# Patient Record
Sex: MALE | Race: BLACK OR AFRICAN AMERICAN | Employment: OTHER | URBAN - METROPOLITAN AREA
[De-identification: names, ages, dates, MRNs, and addresses within clinical notes are randomized per-mention and may not be internally consistent; named-entity substitution may affect disease eponyms.]

---

## 2018-03-08 PROBLEM — M54.12 CERVICAL RADICULOPATHY: Status: ACTIVE | Noted: 2018-03-08

## 2018-03-08 PROBLEM — M50.222 HERNIATED NUCLEUS PULPOSUS, C5-6: Status: ACTIVE | Noted: 2018-03-08

## 2018-03-08 PROBLEM — S13.4XXA WHIPLASH INJURY TO NECK: Status: ACTIVE | Noted: 2018-03-08

## 2018-03-08 PROBLEM — M50.221 HERNIATED NUCLEUS PULPOSUS, C4-5: Status: ACTIVE | Noted: 2018-03-08

## 2018-05-21 ENCOUNTER — OFFICE VISIT (OUTPATIENT)
Dept: PHYSICAL MEDICINE AND REHAB | Age: 53
End: 2018-05-21
Payer: COMMERCIAL

## 2018-05-21 VITALS
WEIGHT: 215 LBS | HEIGHT: 69 IN | BODY MASS INDEX: 31.84 KG/M2 | OXYGEN SATURATION: 97 % | DIASTOLIC BLOOD PRESSURE: 80 MMHG | SYSTOLIC BLOOD PRESSURE: 124 MMHG | HEART RATE: 72 BPM

## 2018-05-21 DIAGNOSIS — S13.4XXS WHIPLASH INJURY TO NECK, SEQUELA: ICD-10-CM

## 2018-05-21 DIAGNOSIS — M50.222 HERNIATED NUCLEUS PULPOSUS, C5-6: ICD-10-CM

## 2018-05-21 DIAGNOSIS — M54.12 RADICULOPATHY OF CERVICAL SPINE: ICD-10-CM

## 2018-05-21 DIAGNOSIS — M50.221 HERNIATED NUCLEUS PULPOSUS, C4-5: Primary | ICD-10-CM

## 2018-05-21 PROCEDURE — 99214 OFFICE O/P EST MOD 30 MIN: CPT | Performed by: PHYSICAL MEDICINE & REHABILITATION

## 2018-05-21 RX ORDER — DULOXETIN HYDROCHLORIDE 60 MG/1
60 CAPSULE, DELAYED RELEASE ORAL DAILY
Qty: 30 CAPSULE | Refills: 5 | Status: SHIPPED | OUTPATIENT
Start: 2018-05-21 | End: 2019-08-07

## 2018-05-21 RX ORDER — DICLOFENAC SODIUM 75 MG/1
TABLET, DELAYED RELEASE ORAL
Refills: 3 | COMMUNITY
Start: 2018-05-02 | End: 2019-06-10 | Stop reason: ALTCHOICE

## 2018-05-21 RX ORDER — PREGABALIN 75 MG/1
75 CAPSULE ORAL 2 TIMES DAILY
Qty: 60 CAPSULE | Refills: 2 | Status: SHIPPED | OUTPATIENT
Start: 2018-05-21 | End: 2018-08-21 | Stop reason: SDUPTHER

## 2018-08-21 ENCOUNTER — OFFICE VISIT (OUTPATIENT)
Dept: PHYSICAL MEDICINE AND REHAB | Age: 53
End: 2018-08-21
Payer: COMMERCIAL

## 2018-08-21 VITALS
OXYGEN SATURATION: 100 % | HEIGHT: 69 IN | BODY MASS INDEX: 31.25 KG/M2 | SYSTOLIC BLOOD PRESSURE: 130 MMHG | DIASTOLIC BLOOD PRESSURE: 88 MMHG | HEART RATE: 54 BPM | WEIGHT: 211 LBS

## 2018-08-21 DIAGNOSIS — M50.221 HERNIATED NUCLEUS PULPOSUS, C4-5: Primary | ICD-10-CM

## 2018-08-21 DIAGNOSIS — S13.9XXS NECK SPRAIN, SEQUELA: ICD-10-CM

## 2018-08-21 DIAGNOSIS — M54.12 C6 RADICULOPATHY: ICD-10-CM

## 2018-08-21 DIAGNOSIS — G89.29 CHRONIC SHOULDER PAIN, UNSPECIFIED LATERALITY: ICD-10-CM

## 2018-08-21 DIAGNOSIS — M25.519 CHRONIC SHOULDER PAIN, UNSPECIFIED LATERALITY: ICD-10-CM

## 2018-08-21 PROCEDURE — 99214 OFFICE O/P EST MOD 30 MIN: CPT | Performed by: PHYSICAL MEDICINE & REHABILITATION

## 2018-08-21 RX ORDER — PREGABALIN 75 MG/1
75 CAPSULE ORAL 2 TIMES DAILY
Qty: 60 CAPSULE | Refills: 2 | Status: SHIPPED | OUTPATIENT
Start: 2018-08-21 | End: 2019-06-10 | Stop reason: ALTCHOICE

## 2018-08-21 NOTE — PATIENT INSTRUCTIONS
We [facility] that [facility] to provide your healthcare needs today. We took great pleasure in caring for you. You may receive a survey to help us learn how to make your next visit to a [facility] facility better than the last.   Your feedback is important to help us continually improve the service we can provide you. Please take the time to complete it thoughtfully if you receive one as we value the feedback in every survey. In this survey we would appreciate that you answer \"always\" or \"yes\" for as many questions as possible (this is the answer we get credit for doing a good job). If you feel there is a question you cannot answer \"always\" or \"yes\", please let us know before you leave today so we can remedy the situation right away. We look forward to continuing to provide you with excellent care. Considering the survey questions related to access to care, please keep in mind the urgency of your problem (i.e. was it an emergent or urgent visit or more routine)  and whether the urgency of that problem was handled appropriately by our office. We always do our best to prioritize the patients who need the care most urgently given our specialty is in short supply and there is a high demand for visits. Thank you for your time and consideration.

## 2018-11-13 ENCOUNTER — APPOINTMENT (OUTPATIENT)
Dept: CT IMAGING | Age: 53
End: 2018-11-13
Payer: COMMERCIAL

## 2018-11-13 ENCOUNTER — HOSPITAL ENCOUNTER (EMERGENCY)
Age: 53
Discharge: HOME | End: 2018-11-13
Attending: EMERGENCY MEDICINE
Payer: COMMERCIAL

## 2018-11-13 ENCOUNTER — APPOINTMENT (OUTPATIENT)
Dept: GENERAL RADIOLOGY | Age: 53
End: 2018-11-13
Payer: COMMERCIAL

## 2018-11-13 VITALS
BODY MASS INDEX: 31.1 KG/M2 | TEMPERATURE: 97 F | WEIGHT: 210 LBS | OXYGEN SATURATION: 99 % | SYSTOLIC BLOOD PRESSURE: 147 MMHG | HEART RATE: 83 BPM | RESPIRATION RATE: 16 BRPM | DIASTOLIC BLOOD PRESSURE: 104 MMHG | HEIGHT: 69 IN

## 2018-11-13 DIAGNOSIS — R06.6 HICCUPS: ICD-10-CM

## 2018-11-13 DIAGNOSIS — R06.00 DYSPNEA, UNSPECIFIED TYPE: Primary | ICD-10-CM

## 2018-11-13 LAB
ALBUMIN SERPL-MCNC: 4.3 G/DL (ref 3.5–5.2)
ALP BLD-CCNC: 57 U/L (ref 40–129)
ALT SERPL-CCNC: 34 U/L (ref 0–40)
ANION GAP SERPL CALCULATED.3IONS-SCNC: 15 MMOL/L (ref 7–16)
AST SERPL-CCNC: 23 U/L (ref 0–39)
BACTERIA: NORMAL /HPF
BASOPHILS ABSOLUTE: 0.02 E9/L (ref 0–0.2)
BASOPHILS RELATIVE PERCENT: 0.2 % (ref 0–2)
BILIRUB SERPL-MCNC: 0.5 MG/DL (ref 0–1.2)
BILIRUBIN URINE: NEGATIVE
BLOOD, URINE: NEGATIVE
BUN BLDV-MCNC: 13 MG/DL (ref 6–20)
CALCIUM SERPL-MCNC: 9.5 MG/DL (ref 8.6–10.2)
CHLORIDE BLD-SCNC: 96 MMOL/L (ref 98–107)
CLARITY: CLEAR
CO2: 26 MMOL/L (ref 22–29)
COLOR: YELLOW
CREAT SERPL-MCNC: 1.2 MG/DL (ref 0.7–1.2)
EKG ATRIAL RATE: 101 BPM
EKG P AXIS: 55 DEGREES
EKG P-R INTERVAL: 188 MS
EKG Q-T INTERVAL: 336 MS
EKG QRS DURATION: 76 MS
EKG QTC CALCULATION (BAZETT): 435 MS
EKG R AXIS: 16 DEGREES
EKG T AXIS: 13 DEGREES
EKG VENTRICULAR RATE: 101 BPM
EOSINOPHILS ABSOLUTE: 0.3 E9/L (ref 0.05–0.5)
EOSINOPHILS RELATIVE PERCENT: 3.5 % (ref 0–6)
GFR AFRICAN AMERICAN: >60
GFR NON-AFRICAN AMERICAN: >60 ML/MIN/1.73
GLUCOSE BLD-MCNC: 108 MG/DL (ref 74–99)
GLUCOSE URINE: NEGATIVE MG/DL
HCT VFR BLD CALC: 48.8 % (ref 37–54)
HEMOGLOBIN: 16.5 G/DL (ref 12.5–16.5)
IMMATURE GRANULOCYTES #: 0.02 E9/L
IMMATURE GRANULOCYTES %: 0.2 % (ref 0–5)
KETONES, URINE: NEGATIVE MG/DL
LACTIC ACID: 0.9 MMOL/L (ref 0.5–2.2)
LACTIC ACID: 2.7 MMOL/L (ref 0.5–2.2)
LEUKOCYTE ESTERASE, URINE: NEGATIVE
LYMPHOCYTES ABSOLUTE: 3.17 E9/L (ref 1.5–4)
LYMPHOCYTES RELATIVE PERCENT: 37.3 % (ref 20–42)
MCH RBC QN AUTO: 28.8 PG (ref 26–35)
MCHC RBC AUTO-ENTMCNC: 33.8 % (ref 32–34.5)
MCV RBC AUTO: 85.3 FL (ref 80–99.9)
MONOCYTES ABSOLUTE: 0.63 E9/L (ref 0.1–0.95)
MONOCYTES RELATIVE PERCENT: 7.4 % (ref 2–12)
NEUTROPHILS ABSOLUTE: 4.37 E9/L (ref 1.8–7.3)
NEUTROPHILS RELATIVE PERCENT: 51.4 % (ref 43–80)
NITRITE, URINE: NEGATIVE
PDW BLD-RTO: 11.9 FL (ref 11.5–15)
PH UA: 8.5 (ref 5–9)
PLATELET # BLD: 214 E9/L (ref 130–450)
PMV BLD AUTO: 11.7 FL (ref 7–12)
POTASSIUM SERPL-SCNC: 4 MMOL/L (ref 3.5–5)
PROTEIN UA: NORMAL MG/DL
RBC # BLD: 5.72 E12/L (ref 3.8–5.8)
RBC UA: NORMAL /HPF (ref 0–2)
SODIUM BLD-SCNC: 137 MMOL/L (ref 132–146)
SPECIFIC GRAVITY UA: 1.01 (ref 1–1.03)
TOTAL PROTEIN: 8.1 G/DL (ref 6.4–8.3)
TROPONIN: <0.01 NG/ML (ref 0–0.03)
TROPONIN: <0.01 NG/ML (ref 0–0.03)
UROBILINOGEN, URINE: 0.2 E.U./DL
WBC # BLD: 8.5 E9/L (ref 4.5–11.5)
WBC UA: NORMAL /HPF (ref 0–5)

## 2018-11-13 PROCEDURE — 6370000000 HC RX 637 (ALT 250 FOR IP): Performed by: EMERGENCY MEDICINE

## 2018-11-13 PROCEDURE — 93005 ELECTROCARDIOGRAM TRACING: CPT | Performed by: PHYSICIAN ASSISTANT

## 2018-11-13 PROCEDURE — 2580000003 HC RX 258: Performed by: EMERGENCY MEDICINE

## 2018-11-13 PROCEDURE — 6360000002 HC RX W HCPCS: Performed by: EMERGENCY MEDICINE

## 2018-11-13 PROCEDURE — 36415 COLL VENOUS BLD VENIPUNCTURE: CPT

## 2018-11-13 PROCEDURE — 6360000004 HC RX CONTRAST MEDICATION: Performed by: RADIOLOGY

## 2018-11-13 PROCEDURE — 83605 ASSAY OF LACTIC ACID: CPT

## 2018-11-13 PROCEDURE — 81001 URINALYSIS AUTO W/SCOPE: CPT

## 2018-11-13 PROCEDURE — 2500000003 HC RX 250 WO HCPCS: Performed by: EMERGENCY MEDICINE

## 2018-11-13 PROCEDURE — 71275 CT ANGIOGRAPHY CHEST: CPT

## 2018-11-13 PROCEDURE — 96372 THER/PROPH/DIAG INJ SC/IM: CPT

## 2018-11-13 PROCEDURE — 84484 ASSAY OF TROPONIN QUANT: CPT

## 2018-11-13 PROCEDURE — 87088 URINE BACTERIA CULTURE: CPT

## 2018-11-13 PROCEDURE — 70450 CT HEAD/BRAIN W/O DYE: CPT

## 2018-11-13 PROCEDURE — 87040 BLOOD CULTURE FOR BACTERIA: CPT

## 2018-11-13 PROCEDURE — 85025 COMPLETE CBC W/AUTO DIFF WBC: CPT

## 2018-11-13 PROCEDURE — 71045 X-RAY EXAM CHEST 1 VIEW: CPT

## 2018-11-13 PROCEDURE — 80053 COMPREHEN METABOLIC PANEL: CPT

## 2018-11-13 PROCEDURE — 99285 EMERGENCY DEPT VISIT HI MDM: CPT

## 2018-11-13 RX ORDER — CHLORPROMAZINE HYDROCHLORIDE 25 MG/1
25 TABLET, FILM COATED ORAL 3 TIMES DAILY PRN
Qty: 12 TABLET | Refills: 0 | Status: SHIPPED | OUTPATIENT
Start: 2018-11-13 | End: 2019-08-07

## 2018-11-13 RX ORDER — ACETAMINOPHEN 500 MG
1000 TABLET ORAL ONCE
Status: COMPLETED | OUTPATIENT
Start: 2018-11-13 | End: 2018-11-13

## 2018-11-13 RX ORDER — CHLORPROMAZINE HYDROCHLORIDE 25 MG/ML
25 INJECTION INTRAMUSCULAR ONCE
Status: COMPLETED | OUTPATIENT
Start: 2018-11-13 | End: 2018-11-13

## 2018-11-13 RX ORDER — SODIUM CHLORIDE 9 MG/ML
INJECTION, SOLUTION INTRAVENOUS CONTINUOUS
Status: DISCONTINUED | OUTPATIENT
Start: 2018-11-13 | End: 2018-11-14 | Stop reason: HOSPADM

## 2018-11-13 RX ORDER — METOCLOPRAMIDE HYDROCHLORIDE 5 MG/ML
5 INJECTION INTRAMUSCULAR; INTRAVENOUS ONCE
Status: COMPLETED | OUTPATIENT
Start: 2018-11-13 | End: 2018-11-13

## 2018-11-13 RX ORDER — 0.9 % SODIUM CHLORIDE 0.9 %
1000 INTRAVENOUS SOLUTION INTRAVENOUS ONCE
Status: COMPLETED | OUTPATIENT
Start: 2018-11-13 | End: 2018-11-13

## 2018-11-13 RX ADMIN — IOPAMIDOL 60 ML: 755 INJECTION, SOLUTION INTRAVENOUS at 20:32

## 2018-11-13 RX ADMIN — SODIUM CHLORIDE: 9 INJECTION, SOLUTION INTRAVENOUS at 21:54

## 2018-11-13 RX ADMIN — METOCLOPRAMIDE 5 MG: 5 INJECTION, SOLUTION INTRAMUSCULAR; INTRAVENOUS at 18:19

## 2018-11-13 RX ADMIN — ACETAMINOPHEN 1000 MG: 500 TABLET ORAL at 22:43

## 2018-11-13 RX ADMIN — CHLORPROMAZINE HYDROCHLORIDE 25 MG: 25 INJECTION INTRAMUSCULAR at 18:17

## 2018-11-13 RX ADMIN — SODIUM CHLORIDE 1000 ML: 9 INJECTION, SOLUTION INTRAVENOUS at 18:20

## 2018-11-13 ASSESSMENT — PAIN SCALES - GENERAL
PAINLEVEL_OUTOF10: 7
PAINLEVEL_OUTOF10: 5

## 2018-11-13 ASSESSMENT — PAIN DESCRIPTION - PAIN TYPE
TYPE: ACUTE PAIN
TYPE: ACUTE PAIN

## 2018-11-13 ASSESSMENT — PAIN DESCRIPTION - LOCATION: LOCATION: CHEST

## 2018-11-13 ASSESSMENT — PAIN DESCRIPTION - ORIENTATION: ORIENTATION: LEFT

## 2018-11-14 NOTE — ED PROVIDER NOTES
0.95 E9/L    Eosinophils # 0.30 0.05 - 0.50 E9/L    Basophils # 0.02 0.00 - 0.20 E9/L   Comprehensive Metabolic Panel   Result Value Ref Range    Sodium 137 132 - 146 mmol/L    Potassium 4.0 3.5 - 5.0 mmol/L    Chloride 96 (L) 98 - 107 mmol/L    CO2 26 22 - 29 mmol/L    Anion Gap 15 7 - 16 mmol/L    Glucose 108 (H) 74 - 99 mg/dL    BUN 13 6 - 20 mg/dL    CREATININE 1.2 0.7 - 1.2 mg/dL    GFR Non-African American >60 >=60 mL/min/1.73    GFR African American >60     Calcium 9.5 8.6 - 10.2 mg/dL    Total Protein 8.1 6.4 - 8.3 g/dL    Alb 4.3 3.5 - 5.2 g/dL    Total Bilirubin 0.5 0.0 - 1.2 mg/dL    Alkaline Phosphatase 57 40 - 129 U/L    ALT 34 0 - 40 U/L    AST 23 0 - 39 U/L   Troponin   Result Value Ref Range    Troponin <0.01 0.00 - 0.03 ng/mL   Lactic Acid, Plasma   Result Value Ref Range    Lactic Acid 2.7 (H) 0.5 - 2.2 mmol/L   Urinalysis   Result Value Ref Range    Color, UA Yellow Straw/Yellow    Clarity, UA Clear Clear    Glucose, Ur Negative Negative mg/dL    Bilirubin Urine Negative Negative    Ketones, Urine Negative Negative mg/dL    Specific Gravity, UA 1.015 1.005 - 1.030    Blood, Urine Negative Negative    pH, UA 8.5 5.0 - 9.0    Protein, UA TRACE Negative mg/dL    Urobilinogen, Urine 0.2 <2.0 E.U./dL    Nitrite, Urine Negative Negative    Leukocyte Esterase, Urine Negative Negative   Microscopic Urinalysis   Result Value Ref Range    WBC, UA NONE 0 - 5 /HPF    RBC, UA 0-1 0 - 2 /HPF    Bacteria, UA NONE /HPF   EKG 12 Lead   Result Value Ref Range    Ventricular Rate 101 BPM    Atrial Rate 101 BPM    P-R Interval 188 ms    QRS Duration 76 ms    Q-T Interval 336 ms    QTc Calculation (Bazett) 435 ms    P Axis 55 degrees    R Axis 16 degrees    T Axis 13 degrees       RADIOLOGY:  Interpreted by Radiologist.  CTA CHEST W CONTRAST   Final Result   Negative for suspected or diagnostic chest CTA evidence of central   pulmonary embolism.          CT Head WO Contrast   Final Result   No evidence of acute specific details for the plan of care and counseling regarding the diagnosis and prognosis. Questions are answered at this time and they are agreeable with the plan.       --------------------------------- IMPRESSION AND DISPOSITION ---------------------------------    IMPRESSION  1. Dyspnea, unspecified type    2. Hiccups        DISPOSITION  Disposition: Discharge to home  Patient condition is stable    NOTE: This report was transcribed using voice recognition software.  Every effort was made to ensure accuracy; however, inadvertent computerized transcription errors may be present        Maria R Gonzalez MD  11/16/18 4201

## 2018-11-15 LAB
ORGANISM: ABNORMAL
URINE CULTURE, ROUTINE: ABNORMAL
URINE CULTURE, ROUTINE: ABNORMAL

## 2018-11-18 LAB
BLOOD CULTURE, ROUTINE: NORMAL
CULTURE, BLOOD 2: NORMAL

## 2018-12-04 ENCOUNTER — OFFICE VISIT (OUTPATIENT)
Dept: PHYSICAL MEDICINE AND REHAB | Age: 53
End: 2018-12-04
Payer: COMMERCIAL

## 2018-12-04 VITALS
DIASTOLIC BLOOD PRESSURE: 94 MMHG | WEIGHT: 219 LBS | HEART RATE: 79 BPM | BODY MASS INDEX: 32.44 KG/M2 | HEIGHT: 69 IN | SYSTOLIC BLOOD PRESSURE: 138 MMHG

## 2018-12-04 DIAGNOSIS — S13.4XXS WHIPLASH INJURY TO NECK, SEQUELA: ICD-10-CM

## 2018-12-04 DIAGNOSIS — M50.221 HERNIATED NUCLEUS PULPOSUS, C4-5: ICD-10-CM

## 2018-12-04 DIAGNOSIS — M54.12 CERVICAL RADICULOPATHY: Primary | ICD-10-CM

## 2018-12-04 DIAGNOSIS — M50.222 HERNIATED NUCLEUS PULPOSUS, C5-6: ICD-10-CM

## 2018-12-04 PROCEDURE — 99214 OFFICE O/P EST MOD 30 MIN: CPT | Performed by: PHYSICAL MEDICINE & REHABILITATION

## 2018-12-04 RX ORDER — OXYCODONE HYDROCHLORIDE 5 MG/1
5 TABLET ORAL PRN
COMMUNITY
Start: 2018-11-07 | End: 2019-06-10 | Stop reason: ALTCHOICE

## 2019-03-04 ENCOUNTER — OFFICE VISIT (OUTPATIENT)
Dept: PHYSICAL MEDICINE AND REHAB | Age: 54
End: 2019-03-04
Payer: COMMERCIAL

## 2019-03-04 VITALS
HEART RATE: 65 BPM | HEIGHT: 66 IN | SYSTOLIC BLOOD PRESSURE: 118 MMHG | WEIGHT: 224 LBS | DIASTOLIC BLOOD PRESSURE: 85 MMHG | BODY MASS INDEX: 36 KG/M2

## 2019-03-04 DIAGNOSIS — M25.519 CHRONIC SHOULDER PAIN, UNSPECIFIED LATERALITY: ICD-10-CM

## 2019-03-04 DIAGNOSIS — S13.9XXS NECK SPRAIN, SEQUELA: ICD-10-CM

## 2019-03-04 DIAGNOSIS — G89.29 CHRONIC SHOULDER PAIN, UNSPECIFIED LATERALITY: ICD-10-CM

## 2019-03-04 DIAGNOSIS — M50.221 HERNIATED NUCLEUS PULPOSUS, C4-5: ICD-10-CM

## 2019-03-04 DIAGNOSIS — M54.12 C6 RADICULOPATHY: ICD-10-CM

## 2019-03-04 PROCEDURE — 99214 OFFICE O/P EST MOD 30 MIN: CPT | Performed by: PHYSICAL MEDICINE & REHABILITATION

## 2019-03-04 RX ORDER — TIZANIDINE 4 MG/1
4 TABLET ORAL 3 TIMES DAILY
Qty: 90 TABLET | Refills: 2 | Status: SHIPPED | OUTPATIENT
Start: 2019-03-04 | End: 2019-07-12

## 2019-03-04 RX ORDER — PREGABALIN 100 MG/1
100 CAPSULE ORAL 2 TIMES DAILY
Qty: 60 CAPSULE | Refills: 2 | Status: SHIPPED | OUTPATIENT
Start: 2019-03-04 | End: 2019-06-10 | Stop reason: SDUPTHER

## 2019-03-04 RX ORDER — PREGABALIN 75 MG/1
75 CAPSULE ORAL 2 TIMES DAILY
Qty: 60 CAPSULE | Refills: 2 | Status: CANCELLED | OUTPATIENT
Start: 2019-03-04 | End: 2019-04-03

## 2019-04-12 ENCOUNTER — TELEPHONE (OUTPATIENT)
Dept: ADMINISTRATIVE | Age: 54
End: 2019-04-12

## 2019-04-12 NOTE — TELEPHONE ENCOUNTER
Pt's wife called asking about establishing her  as a New Pt with Dr Kristine Brown. [facility] offered her Dr Farhana Rivera and Dr Brown Cross, told wife that a message can be sent to Dr Edouard Islas staff concerning an appt with Dr Farzana Morocho.

## 2019-05-02 NOTE — TELEPHONE ENCOUNTER
Pt would like to become a new pt og Dr Shree Anderson, pt's wife Phan Ledezma has been accepted per Dr [facility] has not heard back from previous phone encounter.

## 2019-06-10 ENCOUNTER — OFFICE VISIT (OUTPATIENT)
Dept: PHYSICAL MEDICINE AND REHAB | Age: 54
End: 2019-06-10
Payer: COMMERCIAL

## 2019-06-10 VITALS
SYSTOLIC BLOOD PRESSURE: 123 MMHG | BODY MASS INDEX: 32.29 KG/M2 | HEIGHT: 69 IN | DIASTOLIC BLOOD PRESSURE: 84 MMHG | HEART RATE: 66 BPM | WEIGHT: 218 LBS

## 2019-06-10 DIAGNOSIS — M50.221 HERNIATED NUCLEUS PULPOSUS, C4-5: ICD-10-CM

## 2019-06-10 DIAGNOSIS — M54.12 C6 RADICULOPATHY: ICD-10-CM

## 2019-06-10 DIAGNOSIS — G89.29 CHRONIC SHOULDER PAIN, UNSPECIFIED LATERALITY: ICD-10-CM

## 2019-06-10 DIAGNOSIS — S13.9XXS NECK SPRAIN, SEQUELA: ICD-10-CM

## 2019-06-10 DIAGNOSIS — M25.519 CHRONIC SHOULDER PAIN, UNSPECIFIED LATERALITY: ICD-10-CM

## 2019-06-10 PROCEDURE — 99214 OFFICE O/P EST MOD 30 MIN: CPT | Performed by: PHYSICAL MEDICINE & REHABILITATION

## 2019-06-10 RX ORDER — PREGABALIN 100 MG/1
100 CAPSULE ORAL 2 TIMES DAILY
Qty: 60 CAPSULE | Refills: 2 | Status: SHIPPED | OUTPATIENT
Start: 2019-06-10 | End: 2019-09-25 | Stop reason: SDUPTHER

## 2019-06-10 NOTE — PROGRESS NOTES
Radames Frausto D.O., P.T. [facility] Physical Medicine and Rehabilitation  [address] [facility], [address] [place] [address]  Phone: [phone]  Fax: [phone]      6/10/2019    [facility] Claim #: [id number]   [facility] DOI: 4/19/16   [facility] POR:Dr. Jorge Hedrick  [facility] ATTY: Suzanne Ortega [address], & [email or web address][email or web address], LPA. [facility] Alllowed conditions:  A62.224X CONTUSION OF LEFT SHOULDER LEFT, S60.221A CONTUSION OF RIGHT HAND RIGHT,  H97.468L UNSPECIFIED SPRAIN OF LEFT SHOULDER JOINT LEFT, H60.522 ACUTE CHEMICAL OTITIS EXTERNA, LEFT EAR, S13. 4XXA SPRAIN OF LIGAMENTS OF CERVICAL SPINE, S46.012A FULL THICKNESS ROTATOR CUFF TEAR SHOULDER  LEFT, S46.112A RUPTURED LONG HEAD BICEP TENDON SHOULDER  LEFT, M19.012 SUB AGG ACROMIOCLAVICULAR ARTHROSIS SHOULDER  LEFT, M54.12 RADICULOPATHY, CERVICAL REGION  C6.    [facility] Dismissed Conditions:  M18.11 ARTHROSIS OF THE FIRST METACARPOPHALANGEAL JOINT HAND  RIGHT, M65.841 TENOSYNOVITIS HAND  RIGHT, S62.614S DISP FX OF PROXIMAL PHALANX OF RIGHT RING FINGER, SEQUELA RIGHT, S46.012A PARTIAL THICKNESS ROTATOR CUFF TEAR SHOULDER  LEFT. Chief Complaint   Patient presents with    Neck Pain     3 month follow up   HPI:  Since the last visit the patient has seen no improvement. He has continued with the psychologist and psychiatrist. He has been unable to go to vocational rehab due to his mood disorder they did not feel he was a candidate. He has continued in physical therapy. Today, he has pain in his neck and radiating to his left shoulder and down the forearm into the left thumb and is described as burning, aching. Pain is currently rated Pain Score:   4. There is associated cervical crepitus. The neck pain is worse with movement, better with Flexeril, Cymbalta, Lyrica, Diclofenac. There is associated weakness, paresthesias.      Past Medical History:   Diagnosis Date    Appendicitis     Asthma     due to exposure to chemical     The injured worker denies any prior injury to the same pulse 66, height 5' 9\" (1.753 m), weight 218 lb (98.9 kg). General: The patient is in no apparent distress. Body habitus is non-obese. HEENT: No rhinorrhea, sneezing, yawning, or lacrimation. No scleral icterus or conjunctival injection. SKIN: No piloerection. No track marks. No rash. Normal turgor. No erythema or ecchymosis. Psychological: Mood and affect are appropriate. Hygiene is appropriate. Cardiovascular:  Heart is regular rate and rhythm. Peripheral pulses are 2+ at the dorsalis pedis, posterior tibial and radial arteries. There is no edema. Respiratory: Respirations are regular and unlabored. There is no cyanosis. Lymphatic: There is no cervical or inguinal lymphadenopathy. Gastrointestinal: Soft abdomen, non-tender. No pulsating abdominal mass. Genitourinary: No costovertebral angle tenderness. MSK: Cervical: Cervical lordosis increased,  thoracic kyphosis normal and lumbar lordosis normal. No superficial or bony tenderness. Tender at bilateral cervical paraspinals and trapezius. No edema, erythema, ecchymosis, effusion, instability, mass or deformity. No midline bony tenderness. No trigger points. Spurling is positive left. Cervical AROM flexion is 60*, extension is 20*, lateral flexion is 20* bilaterally, ,rotation is 40* bilaterally. Shoulder: No edema, erythema, ecchymosis, effusion, instability, mass or deformity. Shoulder AROM is full. No painful arc. No crepitus. No tenderness to palpation at the acromioclavicular joint, subacromial space or biceps tendon insertion. Negative Esequiel-Koch, Scarf,  Drop arm, and Speeds. Neurologic: Awake, alert and oriented in three planes. Speech is fluent. No facial weakness. Tongue is midline. Hearing is intact for conversation. Pupils are equal and round. Extraocular muscles are intact. Hearing is intact for conversation. Shoulder shrug symmetric. Sensation: Intact for light touch and pin prick in all upper and lower extremity dermatomes. Vibration and proprioception are intact at the bilateral first MTP. Strength: 4/5 left shoulder abduction, otherwise, 5/5 in all myotomes in the upper and lower extremities. Muscle Tendon Reflexes: 1+ symmetric in the bilateral upper and 2+ lower extremities. Babinski is downgoing bilaterally. Lianne Courtney is negative bilaterally. Gait is Normal.   Romberg is negative. Heel and toe walk are normal.  Tandem walk is normal.  No clonus or spasticity. The patient was able to rise from a chair and squat without difficulty. There is no tremor. Impression:   1. Herniated nucleus pulposus, C4-5    2. C6 radiculopathy    3. Neck sprain, sequela    4. Chronic shoulder pain, unspecified laterality        Plan:  Continue rehabilitation. Orders Placed This Encounter   Medications    pregabalin (LYRICA) 100 MG capsule     Sig: Take 1 capsule by mouth 2 times daily for 30 days. Dispense:  60 capsule     Refill:  2     Orders Placed This Encounter   Procedures   Ptia Stoddard MD, Pain Medicine, [place]     Referral Priority:   Routine     Referral Type:   Eval and Treat     Referral Reason:   Specialty Services Required     Referred to Provider:   Juna Hanna MD     Requested Specialty:   PAIN MEDICINE INTERVENTIONAL PAIN MEDICINE     Number of Visits Requested:   1       Continue Cymbalta  Continued follow up with psychology and psychiatry. The patient was educated about the diagnosis, prognosis, indications, risks and benefits of treatment. An opportunity to ask questions was given to the patient and questions were answered. The patient agreed to proceed with the recommended treatment as described above. Follow up 3 months. Thank you for allowing me to participate in the care of your patient. Tatianna Romano D.O., P.T.   Board Certified Physical Medicine and Rehabilitation  Board Certified Electrodiagnostic Medicine

## 2019-06-10 NOTE — PATIENT INSTRUCTIONS
We appreciate that you chose [facility] to provide your healthcare needs today. We took great pleasure in caring for you. You may receive a survey to help us learn how to make your next visit to a [facility] facility better than the last.   Your feedback is important to help us continually improve the service we can provide you. Please take the time to complete it thoughtfully if you receive one as we value the feedback in every survey. In this survey we would appreciate that you answer \"always\" or \"yes\" for as many questions as possible (this is the answer we get credit for doing a good job). If you feel there is a question you cannot answer \"always\" or \"yes\", please let us know before you leave today so we can remedy the situation right away. We look forward to continuing to provide you with excellent care. Considering the survey questions related to access to care, please keep in mind the urgency of your problem (i.e. was it an emergent or urgent visit or more routine)  and whether the urgency of that problem was handled appropriately by our office. We always do our best to prioritize the patients who need the care most urgently given our specialty is in short supply and there is a high demand for visits. Thank you for your time and consideration.

## 2019-07-12 ENCOUNTER — OFFICE VISIT (OUTPATIENT)
Dept: PAIN MANAGEMENT | Age: 54
End: 2019-07-12
Payer: COMMERCIAL

## 2019-07-12 VITALS
HEIGHT: 69 IN | SYSTOLIC BLOOD PRESSURE: 122 MMHG | RESPIRATION RATE: 16 BRPM | WEIGHT: 215 LBS | OXYGEN SATURATION: 98 % | HEART RATE: 60 BPM | TEMPERATURE: 99.2 F | BODY MASS INDEX: 31.84 KG/M2 | DIASTOLIC BLOOD PRESSURE: 78 MMHG

## 2019-07-12 DIAGNOSIS — M54.12 CERVICAL RADICULITIS: Primary | ICD-10-CM

## 2019-07-12 PROCEDURE — 99214 OFFICE O/P EST MOD 30 MIN: CPT | Performed by: PAIN MEDICINE

## 2019-07-12 PROCEDURE — 99213 OFFICE O/P EST LOW 20 MIN: CPT | Performed by: PAIN MEDICINE

## 2019-07-12 RX ORDER — DULOXETIN HYDROCHLORIDE 60 MG/1
CAPSULE, DELAYED RELEASE ORAL
Refills: 0 | COMMUNITY
Start: 2019-07-05 | End: 2019-08-07

## 2019-07-12 RX ORDER — BRIMONIDINE TARTRATE 2 MG/ML
SOLUTION/ DROPS OPHTHALMIC
Refills: 3 | COMMUNITY
Start: 2019-06-18

## 2019-07-12 NOTE — PROGRESS NOTES
[address] [facility], [address] [place] [address] Paul  [phone]    Follow up Note      Tyree Angel     Date of Visit:  7/12/2019    CC:  Patient presents for follow up   Chief Complaint   Patient presents with    Neck Pain     HPI:  Patient is here to re-establish care with the practice, last seen in 03/2018  Increased neck pain with no radiculopathy  Change in quality of symptoms:no. Medication side effects:not applicable . Recent diagnostic testing:none. Recent interventional procedures:none. .    He has not been on anticoagulation medications to include none. The patient  has not been on herbal supplements. The patient is not diabetic. Imaging: cervical spine MRI 12/2017      1. Straightening of the cervical spine with loss of normal lordosis. 2. No compression fracture or spondylolisthesis. 3. Multilevel mild to moderate degenerative disc disease of the   cervical spine as detailed above. 4. Mild to moderate facet joint arthropathy at left C2-C3.      Previous treatments: Physical Therapy, Epidural Steroid Injection 2016, per patient it had helped and medications. .      Past Medical History:   Diagnosis Date    Appendicitis     Asthma     due to exposure to chemical     Past Surgical History:   Procedure Laterality Date    APPENDECTOMY      CERVICAL FUSION  11/07/2018    LAPAROSCOPIC APPENDECTOMY  5/23/2016    ROTATOR CUFF REPAIR Bilateral     SHOULDER SURGERY Bilateral 1998 r 2007 left     Prior to Admission medications    Medication Sig Start Date End Date Taking?  Authorizing Provider   ARNUITY ELLIPTA 200 MCG/ACT AEPB INHALE ONE PUFF BY MOUTH EVERY DAY AT THE SAME TIME EACH DAY 10/23/17  Yes Historical Provider, MD   doxepin (SINEQUAN) 50 MG capsule 50 mg as needed  9/25/17  Yes Historical Provider, MD   VENTOLIN  (90 Base) MCG/ACT inhaler as needed  8/16/17  Yes Historical Provider, MD   Multiple Vitamin (ONE-A-DAY MENS PO) Take 1 the patient about age related risk factors and have thoroughly discussed the importance of taking these medications as prescribed. The patient verbalizes understanding. ccrmirna Olguin M.D.

## 2019-07-12 NOTE — PROGRESS NOTES
Zhao Marc presents to the [facility] on 7/12/2019. Sri Birch is complaining of pain in his neck. . The pain is constant. The pain is described as aching and stiffness and tightness. . Pain is rated on his best day at a 3, on his worst day at a 7, and on average at a 5 on the VAS scale. He took his last dose of Lyrica and Duloxetine yesterday AM.        Any procedures since your last visit: Yes, had cervical fusion in November of 2018 with Dr. Ankita Franco. He has not been on anticoagulation medications to include none and is managed by NA. Pacemaker or defibrilator: No Physician managing device is NA.       /78   Pulse 60   Temp 99.2 °F (37.3 °C) (Oral)   Resp 16   Ht 5' 9\" (1.753 m)   Wt 215 lb (97.5 kg)   SpO2 98%   BMI 31.75 kg/m²      No LMP for male patient.

## 2019-08-07 ENCOUNTER — OFFICE VISIT (OUTPATIENT)
Dept: FAMILY MEDICINE CLINIC | Age: 54
End: 2019-08-07
Payer: COMMERCIAL

## 2019-08-07 ENCOUNTER — HOSPITAL ENCOUNTER (OUTPATIENT)
Age: 54
Discharge: HOME | End: 2019-08-09
Payer: COMMERCIAL

## 2019-08-07 VITALS
WEIGHT: 215 LBS | RESPIRATION RATE: 20 BRPM | HEART RATE: 63 BPM | OXYGEN SATURATION: 96 % | SYSTOLIC BLOOD PRESSURE: 124 MMHG | HEIGHT: 69 IN | DIASTOLIC BLOOD PRESSURE: 84 MMHG | BODY MASS INDEX: 31.84 KG/M2

## 2019-08-07 DIAGNOSIS — Z00.00 WELLNESS EXAMINATION: Primary | ICD-10-CM

## 2019-08-07 DIAGNOSIS — L60.8 CHANGE IN NAIL APPEARANCE: ICD-10-CM

## 2019-08-07 DIAGNOSIS — Z12.5 PROSTATE CANCER SCREENING: ICD-10-CM

## 2019-08-07 DIAGNOSIS — Z00.00 WELLNESS EXAMINATION: ICD-10-CM

## 2019-08-07 DIAGNOSIS — Z12.11 COLON CANCER SCREENING: ICD-10-CM

## 2019-08-07 LAB
ALBUMIN SERPL-MCNC: 4.6 G/DL (ref 3.5–5.2)
ALP BLD-CCNC: 58 U/L (ref 40–129)
ALT SERPL-CCNC: 70 U/L (ref 0–40)
ANION GAP SERPL CALCULATED.3IONS-SCNC: 11 MMOL/L (ref 7–16)
AST SERPL-CCNC: 43 U/L (ref 0–39)
BILIRUB SERPL-MCNC: 0.4 MG/DL (ref 0–1.2)
BUN BLDV-MCNC: 18 MG/DL (ref 6–20)
CALCIUM SERPL-MCNC: 9.4 MG/DL (ref 8.6–10.2)
CHLORIDE BLD-SCNC: 101 MMOL/L (ref 98–107)
CHOLESTEROL, TOTAL: 242 MG/DL (ref 0–199)
CO2: 27 MMOL/L (ref 22–29)
CREAT SERPL-MCNC: 1.3 MG/DL (ref 0.7–1.2)
GFR AFRICAN AMERICAN: >60
GFR NON-AFRICAN AMERICAN: >60 ML/MIN/1.73
GLUCOSE BLD-MCNC: 95 MG/DL (ref 74–99)
HCT VFR BLD CALC: 49 % (ref 37–54)
HDLC SERPL-MCNC: 40 MG/DL
HEMOGLOBIN: 16.3 G/DL (ref 12.5–16.5)
LDL CHOLESTEROL CALCULATED: 187 MG/DL (ref 0–99)
MCH RBC QN AUTO: 29.4 PG (ref 26–35)
MCHC RBC AUTO-ENTMCNC: 33.3 % (ref 32–34.5)
MCV RBC AUTO: 88.4 FL (ref 80–99.9)
PDW BLD-RTO: 12.3 FL (ref 11.5–15)
PLATELET # BLD: 148 E9/L (ref 130–450)
PMV BLD AUTO: 12.7 FL (ref 7–12)
POTASSIUM SERPL-SCNC: 4.5 MMOL/L (ref 3.5–5)
PROSTATE SPECIFIC ANTIGEN: 1.3 NG/ML (ref 0–4)
RBC # BLD: 5.54 E12/L (ref 3.8–5.8)
SODIUM BLD-SCNC: 139 MMOL/L (ref 132–146)
TOTAL PROTEIN: 8 G/DL (ref 6.4–8.3)
TRIGL SERPL-MCNC: 75 MG/DL (ref 0–149)
TSH SERPL DL<=0.05 MIU/L-ACNC: 1.39 UIU/ML (ref 0.27–4.2)
VLDLC SERPL CALC-MCNC: 15 MG/DL
WBC # BLD: 4 E9/L (ref 4.5–11.5)

## 2019-08-07 PROCEDURE — 36415 COLL VENOUS BLD VENIPUNCTURE: CPT

## 2019-08-07 PROCEDURE — G0103 PSA SCREENING: HCPCS

## 2019-08-07 PROCEDURE — 84443 ASSAY THYROID STIM HORMONE: CPT

## 2019-08-07 PROCEDURE — 80053 COMPREHEN METABOLIC PANEL: CPT

## 2019-08-07 PROCEDURE — 80061 LIPID PANEL: CPT

## 2019-08-07 PROCEDURE — 85027 COMPLETE CBC AUTOMATED: CPT

## 2019-08-07 PROCEDURE — 99386 PREV VISIT NEW AGE 40-64: CPT | Performed by: FAMILY MEDICINE

## 2019-08-07 RX ORDER — DORZOLAMIDE HCL 20 MG/ML
2 SOLUTION/ DROPS OPHTHALMIC 3 TIMES DAILY
COMMUNITY

## 2019-08-07 ASSESSMENT — ENCOUNTER SYMPTOMS
COLOR CHANGE: 1
SHORTNESS OF BREATH: 1
NAUSEA: 0
DIARRHEA: 0
VOMITING: 0

## 2019-08-07 ASSESSMENT — PATIENT HEALTH QUESTIONNAIRE - PHQ9
2. FEELING DOWN, DEPRESSED OR HOPELESS: 0
1. LITTLE INTEREST OR PLEASURE IN DOING THINGS: 0
SUM OF ALL RESPONSES TO PHQ QUESTIONS 1-9: 0
SUM OF ALL RESPONSES TO PHQ QUESTIONS 1-9: 0
SUM OF ALL RESPONSES TO PHQ9 QUESTIONS 1 & 2: 0

## 2019-08-07 NOTE — PROGRESS NOTES
Historical Provider, MD   Multiple Vitamin (ONE-A-DAY MENS PO) Take 1 tablet by mouth daily Yes Historical Provider, MD   latanoprost (XALATAN) 0.005 % ophthalmic solution Place 1 drop into both eyes nightly  Yes Historical Provider, MD   pregabalin (LYRICA) 100 MG capsule Take 1 capsule by mouth 2 times daily for 30 days.   Keri Louis,         Allergies   Allergen Reactions    Lipitor Anaphylaxis    Sulfa Antibiotics Anaphylaxis       Past Medical History:   Diagnosis Date    Asthma     due to exposure to chemical    Glaucoma        Past Surgical History:   Procedure Laterality Date    APPENDECTOMY      CERVICAL FUSION  11/07/2018    LAPAROSCOPIC APPENDECTOMY  5/23/2016    ROTATOR CUFF REPAIR Bilateral     SHOULDER SURGERY Bilateral 1998 r 2007 left       Social History     Socioeconomic History    Marital status:      Spouse name: Not on file    Number of children: Not on file    Years of education: Not on file    Highest education level: Not on file   Occupational History    Not on file   Social Needs    Financial resource strain: Not on file    Food insecurity:     Worry: Not on file     Inability: Not on file    Transportation needs:     Medical: Not on file     Non-medical: Not on file   Tobacco Use    Smoking status: Never Smoker    Smokeless tobacco: Never Used   Substance and Sexual Activity    Alcohol use: Yes     Comment: rarely    Drug use: No    Sexual activity: Not on file   Lifestyle    Physical activity:     Days per week: Not on file     Minutes per session: Not on file    Stress: Not on file   Relationships    Social connections:     Talks on phone: Not on file     Gets together: Not on file     Attends Mandaeism service: Not on file     Active member of club or organization: Not on file     Attends meetings of clubs or organizations: Not on file     Relationship status: Not on file    Intimate partner violence:     Fear of current or ex partner: Not on file Emotionally abused: Not on file     Physically abused: Not on file     Forced sexual activity: Not on file   Other Topics Concern    Not on file   Social History Narrative    Not on file        Family History   Problem Relation Age of Onset    Asthma Mother     High Blood Pressure Mother     Cancer Father         Lung    High Blood Pressure Father     Glaucoma Sister     Glaucoma Brother     Other Brother         Sarcoidosis of Lungs    Diabetes Paternal Grandmother     Heart Attack Paternal Grandfather        Vitals:    08/07/19 1102   BP: 124/84   Pulse: 63   Resp: 20   SpO2: 96%   Weight: 215 lb (97.5 kg)   Height: 5' 9\" (1.753 m)     Estimated body mass index is 31.75 kg/m² as calculated from the following:    Height as of this encounter: 5' 9\" (1.753 m). Weight as of this encounter: 215 lb (97.5 kg). Physical Exam   Constitutional: He is oriented to person, place, and time. He appears well-developed and well-nourished. Cardiovascular: Normal rate, regular rhythm and normal heart sounds. Exam reveals no friction rub. No murmur heard. Pulmonary/Chest: Effort normal and breath sounds normal. No respiratory distress. He has no wheezes. He has no rales. Abdominal: Soft. Bowel sounds are normal. He exhibits no distension. There is no tenderness. There is no rebound and no guarding. Neurological: He is alert and oriented to person, place, and time. Skin: Skin is warm and dry. Vitals reviewed. ASSESSMENT/PLAN:  Kathy East was seen today for established new doctor. Diagnoses and all orders for this visit:    Wellness examination  -     CBC; Future  -     Comprehensive Metabolic Panel; Future  -     Lipid Panel; Future  -     TSH without Reflex; Future    Change in nail appearance  -     TSH without Reflex; Future    Prostate cancer screening  -     Psa screening;  Future    Colon cancer screening  -     Gustavo Jose MD, [place] ([facility])          Return if symptoms worsen or fail to

## 2019-08-08 ENCOUNTER — TELEPHONE (OUTPATIENT)
Dept: FAMILY MEDICINE CLINIC | Age: 54
End: 2019-08-08

## 2019-08-08 DIAGNOSIS — J69.8: Primary | ICD-10-CM

## 2019-08-08 NOTE — TELEPHONE ENCOUNTER
Pt called in asking if you would refer him to a pulmonologist.  Pt has been seeing Dr Nabor Hernandez [facility]) since 2017 when he was exposed to a chemical on vacation (chemical is called [employer] [address]). Pt says his  is recommending the 2nd opinion. Please advise.

## 2019-09-25 ENCOUNTER — OFFICE VISIT (OUTPATIENT)
Dept: PHYSICAL MEDICINE AND REHAB | Age: 54
End: 2019-09-25
Payer: COMMERCIAL

## 2019-09-25 VITALS
BODY MASS INDEX: 31.99 KG/M2 | SYSTOLIC BLOOD PRESSURE: 128 MMHG | HEIGHT: 69 IN | WEIGHT: 216 LBS | HEART RATE: 62 BPM | DIASTOLIC BLOOD PRESSURE: 74 MMHG

## 2019-09-25 DIAGNOSIS — G89.29 CHRONIC SHOULDER PAIN, UNSPECIFIED LATERALITY: ICD-10-CM

## 2019-09-25 DIAGNOSIS — M54.12 C6 RADICULOPATHY: ICD-10-CM

## 2019-09-25 DIAGNOSIS — S13.9XXS NECK SPRAIN, SEQUELA: ICD-10-CM

## 2019-09-25 DIAGNOSIS — M50.221 HERNIATED NUCLEUS PULPOSUS, C4-5: ICD-10-CM

## 2019-09-25 DIAGNOSIS — M25.519 CHRONIC SHOULDER PAIN, UNSPECIFIED LATERALITY: ICD-10-CM

## 2019-09-25 PROCEDURE — 99214 OFFICE O/P EST MOD 30 MIN: CPT | Performed by: PHYSICAL MEDICINE & REHABILITATION

## 2019-09-25 RX ORDER — TIZANIDINE 4 MG/1
4 TABLET ORAL 3 TIMES DAILY
Qty: 90 TABLET | Refills: 2 | Status: SHIPPED | OUTPATIENT
Start: 2019-09-25 | End: 2019-10-25

## 2019-09-25 RX ORDER — DULOXETIN HYDROCHLORIDE 60 MG/1
60 CAPSULE, DELAYED RELEASE ORAL DAILY
COMMUNITY
End: 2020-03-19 | Stop reason: ALTCHOICE

## 2019-09-25 RX ORDER — DULOXETIN HYDROCHLORIDE 30 MG/1
30 CAPSULE, DELAYED RELEASE ORAL DAILY
Refills: 0 | COMMUNITY
Start: 2019-08-20 | End: 2020-03-19 | Stop reason: ALTCHOICE

## 2019-09-25 RX ORDER — PREGABALIN 100 MG/1
100 CAPSULE ORAL 2 TIMES DAILY
Qty: 60 CAPSULE | Refills: 2 | Status: SHIPPED | OUTPATIENT
Start: 2019-09-25 | End: 2019-12-19 | Stop reason: SDUPTHER

## 2019-09-25 NOTE — PROGRESS NOTES
chemical    Glaucoma      The injured worker denies any prior injury to the same body area injured in the claim. Past Surgical History:   Procedure Laterality Date    APPENDECTOMY      CERVICAL FUSION  11/07/2018    LAPAROSCOPIC APPENDECTOMY  5/23/2016    ROTATOR CUFF REPAIR Bilateral     SHOULDER SURGERY Bilateral 1998 r 2007 left     The injured worker denies any prior surgeries to the same body area injured in the claim. Social History     Social History    Marital status:      Social History Main Topics    Smoking status: Never Smoker    Smokeless tobacco: Never Used    Alcohol use Yes      Comment: rarely    Drug use: No    Sexual activity: Not on file     Family History   Problem Relation Age of Onset    Asthma Mother     High Blood Pressure Mother     Cancer Father         Lung    High Blood Pressure Father     Glaucoma Sister     Glaucoma Brother     Other Brother         Sarcoidosis of Lungs    Diabetes Paternal Grandmother     Heart Attack Paternal Grandfather        Allergies   Allergen Reactions    Lipitor Anaphylaxis    Sulfa Antibiotics Anaphylaxis       Current Outpatient Medications   Medication Sig Dispense Refill    DULoxetine (CYMBALTA) 30 MG extended release capsule Take 30 mg by mouth daily Take with 60mg dose  0    DULoxetine (CYMBALTA) 60 MG extended release capsule Take 60 mg by mouth daily Take with 30mg dose      pregabalin (LYRICA) 100 MG capsule Take 1 capsule by mouth 2 times daily for 30 days.  60 capsule 2    tiZANidine (ZANAFLEX) 4 MG tablet Take 1 tablet by mouth 3 times daily 90 tablet 2    Tens Unit MISC by Does not apply route 1 each 0    dorzolamide (TRUSOPT) 2 % ophthalmic solution 2 drops 3 times daily      brimonidine (ALPHAGAN) 0.2 % ophthalmic solution INSTILL ONE DROP TWO TIMES EVERY DAY INTO BOTH EYES.  3    ARNUITY ELLIPTA 200 MCG/ACT AEPB INHALE ONE PUFF BY MOUTH EVERY DAY AT THE SAME TIME EACH DAY  6    doxepin (SINEQUAN) 50 MG capsule 50 mg as needed       VENTOLIN  (90 Base) MCG/ACT inhaler as needed       Multiple Vitamin (ONE-A-DAY MENS PO) Take 1 tablet by mouth daily      latanoprost (XALATAN) 0.005 % ophthalmic solution Place 1 drop into both eyes nightly        No current facility-administered medications for this visit. ROS: No new weakness, paresthesia, incontinence of bowel or bladder, saddle anesthesia, falls or gait dysfunction. Physical Exam: Blood pressure 128/74, pulse 62, height 5' 9\" (1.753 m), weight 216 lb (98 kg). General: The patient is in no apparent distress. Body habitus is non-obese. HEENT: No rhinorrhea, sneezing, yawning, or lacrimation. No scleral icterus or conjunctival injection. SKIN: No piloerection. No track marks. No rash. Normal turgor. No erythema or ecchymosis. Psychological: Mood and affect are appropriate. Hygiene is appropriate. Cardiovascular:  Heart is regular rate and rhythm. Peripheral pulses are 2+ at the dorsalis pedis, posterior tibial and radial arteries. There is no edema. Respiratory: Respirations are regular and unlabored. There is no cyanosis. Lymphatic: There is no cervical or inguinal lymphadenopathy. Gastrointestinal: Soft abdomen, non-tender. No pulsating abdominal mass. Genitourinary: No costovertebral angle tenderness. MSK: Cervical: Cervical lordosis increased,  thoracic kyphosis normal and lumbar lordosis normal. No superficial or bony tenderness. Tender at bilateral cervical paraspinals and trapezius. No edema, erythema, ecchymosis, effusion, instability, mass or deformity. No midline bony tenderness. Trigger points present bilateral trapezius. Spurling is positive left. Cervical AROM flexion is 60*, extension is 20*, lateral flexion is 30* bilaterally, ,rotation is 60* right 50* left  Shoulder: No edema, erythema, ecchymosis, effusion, instability, mass or deformity. Shoulder AROM is full. No painful arc. No crepitus.  No tenderness to

## 2019-10-01 ENCOUNTER — TELEPHONE (OUTPATIENT)
Dept: PAIN MANAGEMENT | Age: 54
End: 2019-10-01

## 2019-10-01 ENCOUNTER — PREP FOR PROCEDURE (OUTPATIENT)
Dept: PAIN MANAGEMENT | Age: 54
End: 2019-10-01

## 2019-10-03 ENCOUNTER — TELEPHONE (OUTPATIENT)
Dept: PAIN MANAGEMENT | Age: 54
End: 2019-10-03

## 2019-10-09 DIAGNOSIS — R74.8 ELEVATED LIVER ENZYMES: ICD-10-CM

## 2019-10-09 DIAGNOSIS — E78.2 MIXED HYPERLIPIDEMIA: Primary | ICD-10-CM

## 2019-10-25 ENCOUNTER — HOSPITAL ENCOUNTER (OUTPATIENT)
Age: 54
Discharge: HOME | End: 2019-10-25
Payer: COMMERCIAL

## 2019-10-25 ENCOUNTER — OFFICE VISIT (OUTPATIENT)
Dept: PULMONOLOGY | Age: 54
End: 2019-10-25
Payer: COMMERCIAL

## 2019-10-25 VITALS
OXYGEN SATURATION: 98 % | HEART RATE: 61 BPM | TEMPERATURE: 97.6 F | SYSTOLIC BLOOD PRESSURE: 132 MMHG | RESPIRATION RATE: 16 BRPM | BODY MASS INDEX: 31.83 KG/M2 | WEIGHT: 214.9 LBS | DIASTOLIC BLOOD PRESSURE: 96 MMHG | HEIGHT: 69 IN

## 2019-10-25 DIAGNOSIS — J18.9 PNEUMONITIS: ICD-10-CM

## 2019-10-25 DIAGNOSIS — R06.02 SOB (SHORTNESS OF BREATH): Primary | ICD-10-CM

## 2019-10-25 LAB
EOSINOPHILS ABSOLUTE COUNT: 290 /UL (ref 50–250)
EXPIRATORY TIME: 4.85 SEC
FEF 25-75% %PRED-PRE: 147 L/SEC
FEF 25-75% PRED: 2.85 L/SEC
FEF 25-75%-PRE: 4.2 L/SEC
FEV1 %PRED-PRE: 110 %
FEV1 PRED: 3.15 L
FEV1/FVC %PRED-PRE: 109 %
FEV1/FVC PRED: 79 %
FEV1/FVC: 86 %
FEV1: 3.48 L
FVC %PRED-PRE: 101 %
FVC PRED: 3.97 L
FVC: 4.04 L
PEF %PRED-PRE: 102 L/SEC
PEF PRED: 8.42 L/SEC
PEF-PRE: 8.6 L/SEC
SEDIMENTATION RATE, ERYTHROCYTE: 3 MM/HR (ref 0–15)

## 2019-10-25 PROCEDURE — 36415 COLL VENOUS BLD VENIPUNCTURE: CPT

## 2019-10-25 PROCEDURE — 94010 BREATHING CAPACITY TEST: CPT | Performed by: INTERNAL MEDICINE

## 2019-10-25 PROCEDURE — 86331 IMMUNODIFFUSION OUCHTERLONY: CPT

## 2019-10-25 PROCEDURE — 85048 AUTOMATED LEUKOCYTE COUNT: CPT

## 2019-10-25 PROCEDURE — 86606 ASPERGILLUS ANTIBODY: CPT

## 2019-10-25 PROCEDURE — 99204 OFFICE O/P NEW MOD 45 MIN: CPT | Performed by: INTERNAL MEDICINE

## 2019-10-25 PROCEDURE — 86003 ALLG SPEC IGE CRUDE XTRC EA: CPT

## 2019-10-25 PROCEDURE — 82785 ASSAY OF IGE: CPT

## 2019-10-25 PROCEDURE — 85651 RBC SED RATE NONAUTOMATED: CPT

## 2019-10-25 RX ORDER — ALBUTEROL SULFATE 90 UG/1
2 AEROSOL, METERED RESPIRATORY (INHALATION) EVERY 4 HOURS PRN
Qty: 1 INHALER | Refills: 6 | Status: SHIPPED | OUTPATIENT
Start: 2019-10-25 | End: 2021-11-10

## 2019-10-25 RX ORDER — BUDESONIDE AND FORMOTEROL FUMARATE DIHYDRATE 160; 4.5 UG/1; UG/1
2 AEROSOL RESPIRATORY (INHALATION) 2 TIMES DAILY
Qty: 1 INHALER | Refills: 5 | Status: SHIPPED
Start: 2019-10-25 | End: 2020-06-26

## 2019-10-25 ASSESSMENT — PULMONARY FUNCTION TESTS
FVC_PERCENT_PREDICTED_PRE: 101
FVC: 4.04
FEV1_PREDICTED: 3.15
FEV1_PERCENT_PREDICTED_PRE: 110
FVC_PREDICTED: 3.97
FEV1/FVC_PERCENT_PREDICTED_PRE: 109
FEV1: 3.48
FEV1/FVC_PREDICTED: 79
FEV1/FVC: 86

## 2019-10-29 LAB
Lab: NORMAL
REPORT: NORMAL
THIS TEST SENT TO: NORMAL

## 2019-11-01 LAB
ASPERGILLUS FUMIGATUS #1: NORMAL
ASPERGILLUS FUMIGATUS #6: NORMAL
AUREOBASIDIUM PULLULANS: NORMAL
MICROPOLYSPORA FAENI: NORMAL
PIGEON SERUM ABS: NORMAL
THERMOACTINOMYCES VULGARIS #1: NORMAL

## 2019-11-06 ENCOUNTER — HOSPITAL ENCOUNTER (OUTPATIENT)
Age: 54
Discharge: HOME | End: 2019-11-08
Payer: COMMERCIAL

## 2019-11-06 DIAGNOSIS — R74.8 ELEVATED LIVER ENZYMES: ICD-10-CM

## 2019-11-06 DIAGNOSIS — E78.2 MIXED HYPERLIPIDEMIA: ICD-10-CM

## 2019-11-06 LAB
ALBUMIN SERPL-MCNC: 4.4 G/DL (ref 3.5–5.2)
ALP BLD-CCNC: 66 U/L (ref 40–129)
ALT SERPL-CCNC: 50 U/L (ref 0–40)
ANION GAP SERPL CALCULATED.3IONS-SCNC: 11 MMOL/L (ref 7–16)
AST SERPL-CCNC: 33 U/L (ref 0–39)
BILIRUB SERPL-MCNC: 0.4 MG/DL (ref 0–1.2)
BUN BLDV-MCNC: 13 MG/DL (ref 6–20)
CALCIUM SERPL-MCNC: 9.3 MG/DL (ref 8.6–10.2)
CHLORIDE BLD-SCNC: 99 MMOL/L (ref 98–107)
CHOLESTEROL, TOTAL: 232 MG/DL (ref 0–199)
CO2: 27 MMOL/L (ref 22–29)
CREAT SERPL-MCNC: 1.2 MG/DL (ref 0.7–1.2)
GFR AFRICAN AMERICAN: >60
GFR NON-AFRICAN AMERICAN: >60 ML/MIN/1.73
GLUCOSE BLD-MCNC: 99 MG/DL (ref 74–99)
HDLC SERPL-MCNC: 34 MG/DL
LDL CHOLESTEROL CALCULATED: 163 MG/DL (ref 0–99)
POTASSIUM SERPL-SCNC: 4.3 MMOL/L (ref 3.5–5)
SODIUM BLD-SCNC: 137 MMOL/L (ref 132–146)
TOTAL PROTEIN: 7.8 G/DL (ref 6.4–8.3)
TRIGL SERPL-MCNC: 176 MG/DL (ref 0–149)
VLDLC SERPL CALC-MCNC: 35 MG/DL

## 2019-11-06 PROCEDURE — 36415 COLL VENOUS BLD VENIPUNCTURE: CPT

## 2019-11-06 PROCEDURE — 80061 LIPID PANEL: CPT

## 2019-11-06 PROCEDURE — 80053 COMPREHEN METABOLIC PANEL: CPT

## 2019-11-22 ENCOUNTER — TELEPHONE (OUTPATIENT)
Dept: PULMONOLOGY | Age: 54
End: 2019-11-22

## 2019-12-19 ENCOUNTER — OFFICE VISIT (OUTPATIENT)
Dept: PHYSICAL MEDICINE AND REHAB | Age: 54
End: 2019-12-19
Payer: COMMERCIAL

## 2019-12-19 VITALS
HEIGHT: 69 IN | SYSTOLIC BLOOD PRESSURE: 130 MMHG | HEART RATE: 64 BPM | DIASTOLIC BLOOD PRESSURE: 92 MMHG | BODY MASS INDEX: 33.18 KG/M2 | WEIGHT: 224 LBS

## 2019-12-19 DIAGNOSIS — M25.519 CHRONIC SHOULDER PAIN, UNSPECIFIED LATERALITY: ICD-10-CM

## 2019-12-19 DIAGNOSIS — S13.9XXS NECK SPRAIN, SEQUELA: ICD-10-CM

## 2019-12-19 DIAGNOSIS — G89.29 CHRONIC SHOULDER PAIN, UNSPECIFIED LATERALITY: ICD-10-CM

## 2019-12-19 DIAGNOSIS — G24.3 CERVICAL DYSTONIA: ICD-10-CM

## 2019-12-19 DIAGNOSIS — M54.12 CERVICAL RADICULOPATHY: ICD-10-CM

## 2019-12-19 DIAGNOSIS — M54.12 C6 RADICULOPATHY: ICD-10-CM

## 2019-12-19 DIAGNOSIS — M50.221 HERNIATED NUCLEUS PULPOSUS, C4-5: Primary | ICD-10-CM

## 2019-12-19 PROCEDURE — 99214 OFFICE O/P EST MOD 30 MIN: CPT | Performed by: PHYSICAL MEDICINE & REHABILITATION

## 2019-12-19 RX ORDER — PREGABALIN 100 MG/1
100 CAPSULE ORAL 2 TIMES DAILY
Qty: 60 CAPSULE | Refills: 2 | Status: SHIPPED
Start: 2019-12-19 | End: 2020-03-19 | Stop reason: SDUPTHER

## 2019-12-26 ENCOUNTER — HOSPITAL ENCOUNTER (EMERGENCY)
Age: 54
Discharge: HOME | End: 2019-12-26
Attending: EMERGENCY MEDICINE
Payer: COMMERCIAL

## 2019-12-26 ENCOUNTER — APPOINTMENT (OUTPATIENT)
Dept: GENERAL RADIOLOGY | Age: 54
End: 2019-12-26
Payer: COMMERCIAL

## 2019-12-26 VITALS
BODY MASS INDEX: 31.7 KG/M2 | OXYGEN SATURATION: 97 % | HEIGHT: 69 IN | HEART RATE: 95 BPM | WEIGHT: 214 LBS | TEMPERATURE: 98.6 F | RESPIRATION RATE: 16 BRPM | SYSTOLIC BLOOD PRESSURE: 147 MMHG | DIASTOLIC BLOOD PRESSURE: 89 MMHG

## 2019-12-26 DIAGNOSIS — J10.1 INFLUENZA B: Primary | ICD-10-CM

## 2019-12-26 DIAGNOSIS — J02.9 ACUTE PHARYNGITIS, UNSPECIFIED ETIOLOGY: ICD-10-CM

## 2019-12-26 LAB
ALBUMIN SERPL-MCNC: 4 G/DL (ref 3.5–5.2)
ALP BLD-CCNC: 64 U/L (ref 40–129)
ALT SERPL-CCNC: 60 U/L (ref 0–40)
ANION GAP SERPL CALCULATED.3IONS-SCNC: 9 MMOL/L (ref 7–16)
AST SERPL-CCNC: 90 U/L (ref 0–39)
BILIRUB SERPL-MCNC: 0.2 MG/DL (ref 0–1.2)
BUN BLDV-MCNC: 7 MG/DL (ref 6–20)
CALCIUM SERPL-MCNC: 8.8 MG/DL (ref 8.6–10.2)
CHLORIDE BLD-SCNC: 101 MMOL/L (ref 98–107)
CO2: 28 MMOL/L (ref 22–29)
CREAT SERPL-MCNC: 1.2 MG/DL (ref 0.7–1.2)
GFR AFRICAN AMERICAN: >60
GFR NON-AFRICAN AMERICAN: >60 ML/MIN/1.73
GLUCOSE BLD-MCNC: 113 MG/DL (ref 74–99)
HCT VFR BLD CALC: 47.2 % (ref 37–54)
HEMOGLOBIN: 15.2 G/DL (ref 12.5–16.5)
INFLUENZA A BY PCR: NOT DETECTED
INFLUENZA B BY PCR: DETECTED
MCH RBC QN AUTO: 28.5 PG (ref 26–35)
MCHC RBC AUTO-ENTMCNC: 32.2 % (ref 32–34.5)
MCV RBC AUTO: 88.6 FL (ref 80–99.9)
PDW BLD-RTO: 12.5 FL (ref 11.5–15)
PLATELET # BLD: 138 E9/L (ref 130–450)
PMV BLD AUTO: 12.6 FL (ref 7–12)
POTASSIUM SERPL-SCNC: 4.1 MMOL/L (ref 3.5–5)
RBC # BLD: 5.33 E12/L (ref 3.8–5.8)
SODIUM BLD-SCNC: 138 MMOL/L (ref 132–146)
STREP GRP A PCR: NEGATIVE
TOTAL PROTEIN: 7.8 G/DL (ref 6.4–8.3)
WBC # BLD: 5.5 E9/L (ref 4.5–11.5)

## 2019-12-26 PROCEDURE — 71045 X-RAY EXAM CHEST 1 VIEW: CPT

## 2019-12-26 PROCEDURE — 6360000002 HC RX W HCPCS: Performed by: EMERGENCY MEDICINE

## 2019-12-26 PROCEDURE — 87880 STREP A ASSAY W/OPTIC: CPT

## 2019-12-26 PROCEDURE — 87502 INFLUENZA DNA AMP PROBE: CPT

## 2019-12-26 PROCEDURE — 87040 BLOOD CULTURE FOR BACTERIA: CPT

## 2019-12-26 PROCEDURE — 2580000003 HC RX 258: Performed by: EMERGENCY MEDICINE

## 2019-12-26 PROCEDURE — 99283 EMERGENCY DEPT VISIT LOW MDM: CPT

## 2019-12-26 PROCEDURE — 80053 COMPREHEN METABOLIC PANEL: CPT

## 2019-12-26 PROCEDURE — 96374 THER/PROPH/DIAG INJ IV PUSH: CPT

## 2019-12-26 PROCEDURE — 6370000000 HC RX 637 (ALT 250 FOR IP): Performed by: EMERGENCY MEDICINE

## 2019-12-26 PROCEDURE — 85027 COMPLETE CBC AUTOMATED: CPT

## 2019-12-26 PROCEDURE — 36415 COLL VENOUS BLD VENIPUNCTURE: CPT

## 2019-12-26 RX ORDER — OSELTAMIVIR PHOSPHATE 75 MG/1
75 CAPSULE ORAL 2 TIMES DAILY
Qty: 10 CAPSULE | Refills: 0 | Status: SHIPPED | OUTPATIENT
Start: 2019-12-26 | End: 2019-12-31

## 2019-12-26 RX ORDER — OSELTAMIVIR PHOSPHATE 75 MG/1
75 CAPSULE ORAL ONCE
Status: COMPLETED | OUTPATIENT
Start: 2019-12-26 | End: 2019-12-26

## 2019-12-26 RX ORDER — KETOROLAC TROMETHAMINE 30 MG/ML
15 INJECTION, SOLUTION INTRAMUSCULAR; INTRAVENOUS ONCE
Status: COMPLETED | OUTPATIENT
Start: 2019-12-26 | End: 2019-12-26

## 2019-12-26 RX ORDER — AMOXICILLIN 500 MG/1
500 CAPSULE ORAL 3 TIMES DAILY
Qty: 30 CAPSULE | Refills: 0 | Status: SHIPPED | OUTPATIENT
Start: 2019-12-26 | End: 2020-01-02

## 2019-12-26 RX ORDER — 0.9 % SODIUM CHLORIDE 0.9 %
1000 INTRAVENOUS SOLUTION INTRAVENOUS ONCE
Status: COMPLETED | OUTPATIENT
Start: 2019-12-26 | End: 2019-12-26

## 2019-12-26 RX ORDER — ACETAMINOPHEN 325 MG/1
650 TABLET ORAL ONCE
Status: COMPLETED | OUTPATIENT
Start: 2019-12-26 | End: 2019-12-26

## 2019-12-26 RX ADMIN — KETOROLAC TROMETHAMINE 15 MG: 30 INJECTION, SOLUTION INTRAMUSCULAR; INTRAVENOUS at 05:54

## 2019-12-26 RX ADMIN — ACETAMINOPHEN 650 MG: 325 TABLET, FILM COATED ORAL at 04:55

## 2019-12-26 RX ADMIN — OSELTAMIVIR PHOSPHATE 75 MG: 75 CAPSULE ORAL at 05:54

## 2019-12-26 RX ADMIN — SODIUM CHLORIDE 1000 ML: 9 INJECTION, SOLUTION INTRAVENOUS at 04:55

## 2019-12-26 ASSESSMENT — PAIN SCALES - GENERAL
PAINLEVEL_OUTOF10: 7
PAINLEVEL_OUTOF10: 7

## 2019-12-30 ENCOUNTER — TELEPHONE (OUTPATIENT)
Dept: PHYSICAL MEDICINE AND REHAB | Age: 54
End: 2019-12-30

## 2019-12-30 NOTE — TELEPHONE ENCOUNTER
Called and spoke with BERNIE from [facility] prior authorization department to get prior authorization for patient Botox. BERNIE stated that we have to fax office notes and CPT codes to [phone], clinical information was faxed 12-30-19. Will wait to here from [facility].

## 2019-12-31 LAB
BLOOD CULTURE, ROUTINE: NORMAL
CULTURE, BLOOD 2: NORMAL

## 2020-01-02 ENCOUNTER — HOSPITAL ENCOUNTER (EMERGENCY)
Age: 55
Discharge: HOME | End: 2020-01-02
Payer: COMMERCIAL

## 2020-01-02 VITALS
TEMPERATURE: 98 F | RESPIRATION RATE: 16 BRPM | HEIGHT: 69 IN | WEIGHT: 214 LBS | HEART RATE: 70 BPM | OXYGEN SATURATION: 99 % | SYSTOLIC BLOOD PRESSURE: 124 MMHG | BODY MASS INDEX: 31.7 KG/M2 | DIASTOLIC BLOOD PRESSURE: 68 MMHG

## 2020-01-02 PROCEDURE — 99282 EMERGENCY DEPT VISIT SF MDM: CPT

## 2020-01-02 PROCEDURE — 6370000000 HC RX 637 (ALT 250 FOR IP): Performed by: NURSE PRACTITIONER

## 2020-01-02 PROCEDURE — 2500000003 HC RX 250 WO HCPCS: Performed by: NURSE PRACTITIONER

## 2020-01-02 RX ORDER — LIDOCAINE HYDROCHLORIDE AND EPINEPHRINE 10; 10 MG/ML; UG/ML
20 INJECTION, SOLUTION INFILTRATION; PERINEURAL ONCE
Status: COMPLETED | OUTPATIENT
Start: 2020-01-02 | End: 2020-01-02

## 2020-01-02 RX ORDER — CEPHALEXIN 500 MG/1
500 CAPSULE ORAL 3 TIMES DAILY
Qty: 9 CAPSULE | Refills: 0 | Status: SHIPPED | OUTPATIENT
Start: 2020-01-02 | End: 2020-01-05

## 2020-01-02 RX ORDER — DIAPER,BRIEF,INFANT-TODD,DISP
EACH MISCELLANEOUS ONCE
Status: COMPLETED | OUTPATIENT
Start: 2020-01-02 | End: 2020-01-02

## 2020-01-02 RX ADMIN — LIDOCAINE HYDROCHLORIDE,EPINEPHRINE BITARTRATE 20 ML: 10; .01 INJECTION, SOLUTION INFILTRATION; PERINEURAL at 17:27

## 2020-01-02 RX ADMIN — BACITRACIN ZINC: 500 OINTMENT TOPICAL at 17:55

## 2020-01-02 ASSESSMENT — PAIN DESCRIPTION - DESCRIPTORS: DESCRIPTORS: ACHING

## 2020-01-02 ASSESSMENT — PAIN DESCRIPTION - FREQUENCY: FREQUENCY: CONTINUOUS

## 2020-01-02 ASSESSMENT — PAIN DESCRIPTION - LOCATION: LOCATION: FINGER (COMMENT WHICH ONE)

## 2020-01-02 ASSESSMENT — PAIN SCALES - GENERAL: PAINLEVEL_OUTOF10: 10

## 2020-01-02 ASSESSMENT — PAIN DESCRIPTION - PAIN TYPE: TYPE: ACUTE PAIN

## 2020-01-02 ASSESSMENT — PAIN DESCRIPTION - PROGRESSION: CLINICAL_PROGRESSION: GRADUALLY WORSENING

## 2020-01-02 ASSESSMENT — PAIN DESCRIPTION - ORIENTATION: ORIENTATION: RIGHT

## 2020-01-04 NOTE — ED PROVIDER NOTES
[facility]     Department of Emergency Medicine   ED  Provider Note  Admit Date/RoomTime: 1/2/2020  4:32 PM  ED Room: CROW/CROW   Chief Complaint   Finger Pain (states he started with a hang nail right middle finger about a week ago seen at [facility] mild relief increased pain and swelling today)    History of Present Illness   Source of history provided by:  patient. History/Exam Limitations: none. Luis Murray is a 48 y.o. old male presenting to the emergency department by private vehicle, for Right Middle Finger pain which occured 1 week(s) prior to arrival.  The complaint occurred as a result of had hangnal that got infected, had stab I+D at urgent care several dyas ago, started on keflex, states no improvement while at home. Previous injury: no.  Since onset the symptoms have been mild in degree. His pain is aggraveated by movement, use and palpation and relieved by nothing. He is right handed. He denies any fever or chills. He just finished amoxicillin for URI symptoms. ROS    Pertinent positives and negatives are stated within HPI, all other systems reviewed and are negative. Past Surgical History:   Procedure Laterality Date    APPENDECTOMY      CERVICAL FUSION  11/07/2018    LAPAROSCOPIC APPENDECTOMY  5/23/2016    ROTATOR CUFF REPAIR Bilateral     SHOULDER SURGERY Bilateral 1998 r 2007 left   Social History:  reports that he has never smoked. He has never used smokeless tobacco. He reports current alcohol use. He reports that he does not use drugs. Family History: family history includes Asthma in his mother; Cancer in his father; Diabetes in his paternal grandmother; Glaucoma in his brother and sister; Heart Attack in his paternal grandfather; High Blood Pressure in his father and mother; Other in his brother.    Allergies: Lipitor and Sulfa antibiotics    Physical Exam           ED Triage Vitals   BP Temp Temp Source Pulse Resp SpO2 Height Weight   01/02/20 1609 01/02/20 1609 01/02/20 1752 01/02/20 1609 01/02/20 1609 01/02/20 1609 01/02/20 1609 01/02/20 1609   129/88 98.3 °F (36.8 °C) Temporal 77 14 97 % 5' 9\" (1.753 m) 214 lb (97.1 kg)     Oxygen Saturation Interpretation: Normal.    Constitutional:  Alert, development consistent with age. Neck:  Normal ROM. Supple. Non-tender. Fingers:  Right Middle finger nail bed            Tenderness:  mild. Swelling: Mild with fluctuance. Deformity: no.              ROM: full range of motion. Skin:  warmth, tenderness and swelling. Neurovascular: Motor deficit: none. Sensory deficit:   none. Pulse deficit: none. Capillary refill: normal.  Hand: Right dorsal & volar. Tenderness: none. Swelling: None. Deformity: no.             Skin:  no erythema, rash or wounds noted. Wrist:               Tenderness:  none. Swelling: None. Deformity: no.             ROM: full range of motion. Skin:  no erythema, rash or wounds noted. Lymphatics: No lymphangitis or adenopathy noted. Neurological:  Oriented. Motor functions intact. Lab / Imaging Results   (All laboratory and radiology results have been personally reviewed by myself)  Labs:  No results found for this visit on 01/02/20. Imaging: All Radiology results interpreted by Radiologist unless otherwise noted. No orders to display       ED Course / Medical Decision Making     Medications   lidocaine-EPINEPHrine 1 percent-1:[id number] injection 20 mL (20 mLs Intradermal Given 1/2/20 1727)   bacitracin zinc ointment ( Topical Given 1/2/20 1755)        Consult(s):   None    Procedure(s):     PROCEDURE  1/2/20       Time: 1730    INCISION AND DRAINAGE  Risks, benefits and alternatives (for applicable procedures below) described. Performed By: JUMANA Agrawal - CNP.     Indication: paronychia  Informed consent obtained: The patient provided verbal consent for this procedure. .  Prep: The skin was cleansed with povidone iodine and draped in a sterile fashion. Anesthetic: The wound area was anesthetized with Lidocaine 1% without epinephrine. Incision: Soft tissue abscess of finger was Incised by scalpal and moderate, purulent fluid was drained. A wound culture was not obtained. The wound  was irrigated and was not packed with iodoform gauze. The wound was then covered with a sterile dressing. Patient tolerated the procedure well. Medical Decision Making:   Patient is well-appearing, afebrile. Presents with paronychia to right middle digit. Wound cleaned, incised and drained for moderate amount of purulent fluid. Patient to continue Keflex, as he just completed a prescription of amoxicillin that was previously prescribed will prescribe an additional 3 days, patient instructed to use warm soapy water soaks, follow-up with PCP for wound recheck. Educated on signs and symptoms which require emergent evaluation. Counseling: The emergency provider has spoken with the patient and discussed todays results, in addition to providing specific details for the plan of care and counseling regarding the diagnosis and prognosis. Questions are answered at this time and they are agreeable with the plan. Assessment      1. Paronychia of finger of right hand      Plan   Discharge to home  Patient condition is good    New Medications     Discharge Medication List as of 1/2/2020  5:41 PM      START taking these medications    Details   cephALEXin (KEFLEX) 500 MG capsule Take 1 capsule by mouth 3 times daily for 3 days, Disp-9 capsule, R-0Print           Electronically signed by JUMANA Tran CNP   DD: 1/4/20  **This report was transcribed using voice recognition software. Every effort was made to ensure accuracy; however, inadvertent computerized transcription errors may be present.   END OF ED PROVIDER NOTE     Lázaro Whitman Washington Bell, APRN - CNP  01/04/20 Alexander Thomas 63., MD  01/07/20 1012

## 2020-01-14 ENCOUNTER — HOSPITAL ENCOUNTER (OUTPATIENT)
Age: 55
Setting detail: OUTPATIENT SURGERY
Discharge: HOME | End: 2020-01-14
Attending: PAIN MEDICINE | Admitting: PAIN MEDICINE
Payer: COMMERCIAL

## 2020-01-14 ENCOUNTER — HOSPITAL ENCOUNTER (OUTPATIENT)
Dept: OPERATING ROOM | Age: 55
Setting detail: OUTPATIENT SURGERY
Discharge: HOME | End: 2020-01-14
Attending: PAIN MEDICINE
Payer: COMMERCIAL

## 2020-01-14 VITALS
OXYGEN SATURATION: 97 % | DIASTOLIC BLOOD PRESSURE: 91 MMHG | HEART RATE: 68 BPM | SYSTOLIC BLOOD PRESSURE: 153 MMHG | RESPIRATION RATE: 14 BRPM

## 2020-01-14 PROBLEM — M47.812 CERVICAL SPONDYLOSIS: Status: ACTIVE | Noted: 2020-01-14

## 2020-01-14 PROCEDURE — 64490 INJ PARAVERT F JNT C/T 1 LEV: CPT | Performed by: PAIN MEDICINE

## 2020-01-14 PROCEDURE — 2709999900 HC NON-CHARGEABLE SUPPLY: Performed by: PAIN MEDICINE

## 2020-01-14 PROCEDURE — 7100000010 HC PHASE II RECOVERY - FIRST 15 MIN: Performed by: PAIN MEDICINE

## 2020-01-14 PROCEDURE — 6360000004 HC RX CONTRAST MEDICATION: Performed by: PAIN MEDICINE

## 2020-01-14 PROCEDURE — 2500000003 HC RX 250 WO HCPCS: Performed by: PAIN MEDICINE

## 2020-01-14 PROCEDURE — 3600000005 HC SURGERY LEVEL 5 BASE: Performed by: PAIN MEDICINE

## 2020-01-14 PROCEDURE — 6360000002 HC RX W HCPCS: Performed by: PAIN MEDICINE

## 2020-01-14 PROCEDURE — 3209999900 FLUORO FOR SURGICAL PROCEDURES

## 2020-01-14 PROCEDURE — 3600000015 HC SURGERY LEVEL 5 ADDTL 15MIN: Performed by: PAIN MEDICINE

## 2020-01-14 PROCEDURE — 64491 INJ PARAVERT F JNT C/T 2 LEV: CPT | Performed by: PAIN MEDICINE

## 2020-01-14 PROCEDURE — 7100000011 HC PHASE II RECOVERY - ADDTL 15 MIN: Performed by: PAIN MEDICINE

## 2020-01-14 RX ORDER — LIDOCAINE HYDROCHLORIDE 5 MG/ML
INJECTION, SOLUTION INFILTRATION; INTRAVENOUS PRN
Status: DISCONTINUED | OUTPATIENT
Start: 2020-01-14 | End: 2020-01-14 | Stop reason: ALTCHOICE

## 2020-01-14 ASSESSMENT — PAIN SCALES - GENERAL
PAINLEVEL_OUTOF10: 0
PAINLEVEL_OUTOF10: 0

## 2020-01-14 NOTE — TELEPHONE ENCOUNTER
Called and left message on [address] voicemail that we received an authorization for Botox wanted to know if we can use a specialty pharmacy to get the Botox. Will wait for return call from patient.

## 2020-01-14 NOTE — TELEPHONE ENCOUNTER
Called and spoke with the patient to inform him that we received an approval for his Botox through his medical insurance. Patient stated he will have to call us back to schedule. He was going to an injection from Dr Octavio House.

## 2020-01-14 NOTE — H&P
[address]  [facility], [address] [place] [address] Paul  [phone]    Procedure History & Physical      Romeo Kappa     HPI:    Patient  is here for axial neck pain for bilateral C2,3,4 MBB  Labs/imaging studies reviewed   All question and concerns addressed including R/B/A associated with the procedure    Past Medical History:   Diagnosis Date    Asthma     due to exposure to chemical    Glaucoma        Past Surgical History:   Procedure Laterality Date    APPENDECTOMY      CERVICAL FUSION  11/07/2018    LAPAROSCOPIC APPENDECTOMY  5/23/2016    ROTATOR CUFF REPAIR Bilateral     SHOULDER SURGERY Bilateral 1998 r 2007 left       Prior to Admission medications    Medication Sig Start Date End Date Taking? Authorizing Provider   pregabalin (LYRICA) 100 MG capsule Take 1 capsule by mouth 2 times daily for 30 days. 12/19/19 1/18/20 Yes Keri Louis DO   budesonide-formoterol (SYMBICORT) 160-4.5 MCG/ACT AERO Inhale 2 puffs into the lungs 2 times daily Rinse mouth after use.  10/25/19  Yes Jacklyn Pelayo MD   albuterol sulfate HFA (PROAIR HFA) 108 (90 Base) MCG/ACT inhaler Inhale 2 puffs into the lungs every 4 hours as needed for Wheezing or Shortness of Breath 10/25/19 12/23/19 Yes Liza Bush MD   DULoxetine (CYMBALTA) 30 MG extended release capsule Take 30 mg by mouth daily Take with 60mg dose 8/20/19  Yes Historical Provider, MD   DULoxetine (CYMBALTA) 60 MG extended release capsule Take 60 mg by mouth daily Take with 30mg dose   Yes Historical Provider, MD   dorzolamide (TRUSOPT) 2 % ophthalmic solution 2 drops 3 times daily   Yes Historical Provider, MD   brimonidine (ALPHAGAN) 0.2 % ophthalmic solution INSTILL ONE DROP TWO TIMES EVERY DAY INTO BOTH EYES. 6/18/19  Yes Historical Provider, MD   ARNUITY ELLIPTA 200 MCG/ACT AEPB INHALE ONE PUFF BY MOUTH EVERY DAY AT [place] [address] 10/23/17  Yes Historical Provider, MD   doxepin (SINEQUAN) 50 MG capsule 50 Brother         Sarcoidosis of Lungs    Diabetes Paternal Grandmother     Heart Attack Paternal Grandfather          REVIEW OF SYSTEMS:    CONSTITUTIONAL:  negative for  fevers, chills, sweats and fatigue    RESPIRATORY:  negative for  dry cough, cough with sputum, dyspnea, wheezing and chest pain    CARDIOVASCULAR:  negative for chest pain, dyspnea, palpitations, syncope    GASTROINTESTINAL:  negative for nausea, vomiting, change in bowel habits, diarrhea, constipation and abdominal pain    MUSCULOSKELETAL: negative for muscle weakness    SKIN: negative for itching or rashes. BEHAVIOR/PSYCH:  negative for poor appetite, increased appetite, decreased sleep and poor concentration    All other systems negative      PHYSICAL EXAM:    VITALS:  BP (!) 145/87   Pulse 76   Resp 16     CONSTITUTIONAL:  awake, alert, cooperative, no apparent distress, and appears stated age    EYES: PERRLA, EOMI    LUNGS:  No increased work of breathing, no audible wheezing    CARDIOVASCULAR:  regular rate and rhythm    ABDOMEN:  Soft non tender non distended     EXTREMITIES: no signs of clubbing or cyanosis. MUSCULOSKELETAL: negative for flaccid muscle tone or spastic movements. SKIN: gross examination reveals no signs of rashes, or diaphoresis. NEURO: Cranial nerves II-XII grossly intact. No signs of agitated mood.        Assessment/Plan:    Axial neck pain for bilateral C2,3,4 MBB

## 2020-01-20 ENCOUNTER — HOSPITAL ENCOUNTER (OUTPATIENT)
Dept: SLEEP CENTER | Age: 55
Discharge: HOME | End: 2020-01-20
Payer: COMMERCIAL

## 2020-01-20 PROCEDURE — 95810 POLYSOM 6/> YRS 4/> PARAM: CPT

## 2020-01-21 VITALS
SYSTOLIC BLOOD PRESSURE: 110 MMHG | BODY MASS INDEX: 31.7 KG/M2 | HEIGHT: 69 IN | OXYGEN SATURATION: 98 % | WEIGHT: 214 LBS | HEART RATE: 62 BPM | DIASTOLIC BLOOD PRESSURE: 84 MMHG

## 2020-01-21 ASSESSMENT — SLEEP AND FATIGUE QUESTIONNAIRES
HOW LIKELY ARE YOU TO NOD OFF OR FALL ASLEEP WHILE SITTING AND READING: 2
HOW LIKELY ARE YOU TO NOD OFF OR FALL ASLEEP IN A CAR, WHILE STOPPED FOR A FEW MINUTES IN TRAFFIC: 0
HOW LIKELY ARE YOU TO NOD OFF OR FALL ASLEEP WHILE SITTING AND TALKING TO SOMEONE: 0
HOW LIKELY ARE YOU TO NOD OFF OR FALL ASLEEP WHEN YOU ARE A PASSENGER IN A CAR FOR AN HOUR WITHOUT A BREAK: 1
HOW LIKELY ARE YOU TO NOD OFF OR FALL ASLEEP WHILE LYING DOWN TO REST IN THE AFTERNOON WHEN CIRCUMSTANCES PERMIT: 1
HOW LIKELY ARE YOU TO NOD OFF OR FALL ASLEEP WHILE SITTING INACTIVE IN A PUBLIC PLACE: 1
HOW LIKELY ARE YOU TO NOD OFF OR FALL ASLEEP WHILE SITTING QUIETLY AFTER LUNCH WITHOUT ALCOHOL: 0
HOW LIKELY ARE YOU TO NOD OFF OR FALL ASLEEP WHILE WATCHING TV: 2
ESS TOTAL SCORE: 7

## 2020-02-03 PROCEDURE — 95810 POLYSOM 6/> YRS 4/> PARAM: CPT | Performed by: INTERNAL MEDICINE

## 2020-02-04 ENCOUNTER — PREP FOR PROCEDURE (OUTPATIENT)
Dept: PAIN MANAGEMENT | Age: 55
End: 2020-02-04

## 2020-02-04 ENCOUNTER — OFFICE VISIT (OUTPATIENT)
Dept: PAIN MANAGEMENT | Age: 55
End: 2020-02-04
Payer: COMMERCIAL

## 2020-02-04 VITALS
RESPIRATION RATE: 16 BRPM | DIASTOLIC BLOOD PRESSURE: 78 MMHG | WEIGHT: 208 LBS | HEIGHT: 69 IN | OXYGEN SATURATION: 98 % | TEMPERATURE: 98 F | SYSTOLIC BLOOD PRESSURE: 128 MMHG | BODY MASS INDEX: 30.81 KG/M2 | HEART RATE: 61 BPM

## 2020-02-04 PROCEDURE — 99213 OFFICE O/P EST LOW 20 MIN: CPT | Performed by: PAIN MEDICINE

## 2020-02-04 NOTE — PROGRESS NOTES
2007 left     Prior to Admission medications    Medication Sig Start Date End Date Taking? Authorizing Provider   budesonide-formoterol (SYMBICORT) 160-4.5 MCG/ACT AERO Inhale 2 puffs into the lungs 2 times daily Rinse mouth after use. 10/25/19  Yes Kandice Henao MD   DULoxetine (CYMBALTA) 30 MG extended release capsule Take 30 mg by mouth daily Take with 60mg dose 8/20/19  Yes Historical Provider, MD   DULoxetine (CYMBALTA) 60 MG extended release capsule Take 60 mg by mouth daily Take with 30mg dose   Yes Historical Provider, MD   dorzolamide (TRUSOPT) 2 % ophthalmic solution 2 drops 3 times daily   Yes Historical Provider, MD   brimonidine (ALPHAGAN) 0.2 % ophthalmic solution INSTILL ONE DROP TWO TIMES EVERY DAY INTO BOTH EYES. 6/18/19  Yes Historical Provider, MD   ARNJEIMY ELLIPTA 200 MCG/ACT AEPB INHALE ONE PUFF BY MOUTH EVERY DAY AT [place] [address] 10/23/17  Yes Historical Provider, MD   doxepin (SINEQUAN) 50 MG capsule 50 mg as needed  9/25/17  Yes Historical Provider, MD   VENTOLIN  (90 Base) MCG/ACT inhaler as needed  8/16/17  Yes Historical Provider, MD   latanoprost (XALATAN) 0.005 % ophthalmic solution Place 1 drop into both eyes nightly    Yes Historical Provider, MD   pregabalin (LYRICA) 100 MG capsule Take 1 capsule by mouth 2 times daily for 30 days.  12/19/19 1/18/20  Keri Louis,    albuterol sulfate HFA (PROAIR HFA) 108 (90 Base) MCG/ACT inhaler Inhale 2 puffs into the lungs every 4 hours as needed for Wheezing or Shortness of Breath 10/25/19 12/23/19  Jacklyn Castro MD     Allergies   Allergen Reactions    Lipitor Anaphylaxis    Sulfa Antibiotics Anaphylaxis     Social History     Socioeconomic History    Marital status:      Spouse name: Not on file    Number of children: Not on file    Years of education: Not on file    Highest education level: Not on file   Occupational History    Not on file   Social Needs    Financial resource strain: Not on file   Kavitha Hollingsworth Food insecurity:     Worry: Not on file     Inability: Not on file    Transportation needs:     Medical: Not on file     Non-medical: Not on file   Tobacco Use    Smoking status: Never Smoker    Smokeless tobacco: Never Used   Substance and Sexual Activity    Alcohol use: Yes     Comment: rarely    Drug use: No    Sexual activity: Not on file   Lifestyle    Physical activity:     Days per week: Not on file     Minutes per session: Not on file    Stress: Not on file   Relationships    Social connections:     Talks on phone: Not on file     Gets together: Not on file     Attends Restorationism service: Not on file     Active member of club or organization: Not on file     Attends meetings of clubs or organizations: Not on file     Relationship status: Not on file    Intimate partner violence:     Fear of current or ex partner: Not on file     Emotionally abused: Not on file     Physically abused: Not on file     Forced sexual activity: Not on file   Other Topics Concern    Not on file   Social History Narrative    Not on file       Family History   Problem Relation Age of Onset    Asthma Mother     High Blood Pressure Mother     Cancer Father         Lung    High Blood Pressure Father     Glaucoma Sister     Glaucoma Brother     Other Brother         Sarcoidosis of Lungs    Diabetes Paternal Grandmother     Heart Attack Paternal Grandfather        REVIEW OF SYSTEMS:     Antonio Barriga denies fever/chills, chest pain, shortness of breath, new bowel or bladder complaints. All other review of systems was negative. PHYSICAL EXAMINATION:      /78   Pulse 61   Temp 98 °F (36.7 °C) (Oral)   Resp 16   Ht 5' 9\" (1.753 m)   Wt 208 lb (94.3 kg)   SpO2 98%   BMI 30.72 kg/m²     General:       General appearance: awake, alert, oriented   pleasant and well-hydrated.    , in mild discomfort and A & O x3  Build:Normal Weight  Function:Rises from a seated position easily.     HEENT:     Head:normocephalic and atraumatic  Pupils:regular, round and equal.  Sclera: icterus absent,      Lungs:     Breathing:Breathing Pattern: regular, no distress     Abdomen:     Shape:non-distended and normal  Scars:yes  Tenderness:none  Guarding:none     Cervical spine:     Inspection:anterior fusion scar  Palpation:tenderness paravertebral muscles, facet loading, left, right and positive. (Left worse than right) had improved   Range of motion:abnormal mildly flexion, extension rotation left,right and is  painful. Left is worse than right     Musculoskeletal:     Trigger points in trapezius:present bilaterally  Trigger points in Paraveteral:present bilaterally  Trigger points in supraspinatus/infraspinatus:absent  Spurling's:negative right, negative left     Extremities:     Tremors:None bilaterally upper and lower  Range of motion:Generally normal shoulders  Intact:Yes  Edema:Normal     Neurological:     Sensory:normal to joint position sense and light touch   Motor:   Right Grip5/5              Left Grip5/5               Right Bicep5/5           Left Bicep5/5              Right Triceps5/5       Left Triceps5/5          Right Deltoid5/5     Left Deltoid5/5                  Coordination:normal  Reflexes:    Right Brachioradialis reflex2+  Left Brachioradialis reflex2+  Right Biceps reflex2+  Left Biceps reflex2+  Right Triceps reflex2+  Left Triceps reflex2+  Gait:normal     Dermatology:     Skin:no unusual rashes and no skin lesions     Assessment/Plan:  Chronic neck pain with radiation to the Left upper extremity that started after a work related injury in 2016  Patient had seen Rainer Izaguirre but he didn't recommend surgery  Patient had seen  at [facility] and had underwent C4-5/C5-6 anterior fusion 11/2018  Plan:  Follow up on his neck pain, last seen by me for an office visit 07/2019  Patient is s/p bilateral C2,3,4 MBB with excellent relief more than 70% improvement for more than 5 days.  Discussed repeating patient agrees  Patient is here to re-establish care with the practice, last seen 03/2018 with complaints of neck pain with occasional radiation to the Left upper extremity   Currently seeing Sanya Holden and is on Lyrica/Voltaren/Cymbalta/Flexeril  [place]ARRS report reviewed  Patient encouraged to stay active   Treatment plan discussed with the patient including medications and procedure side effects     We discussed with the patient that combining opioids, benzodiazepines, alcohol, illicit drugs or sleep aids increases the risk of respiratory depression including death. We discussed that these medications may cause drowsiness, sedation or dizziness and have counseled the patient not to drive or operate machinery. We have discussed that these medications will be prescribed only by one provider. We have discussed with the patient about age related risk factors and have thoroughly discussed the importance of taking these medications as prescribed. The patient verbalizes understanding. ccrefrying marta Bal M.D.

## 2020-02-04 NOTE — PROGRESS NOTES
microarousal.    FINDINGS:  SLEEP CONTINUITY AND SLEEP ARCHITECTURE:  Lights were turned off at  11:48 p.m. and lights were turned on at 06:15 a.m. Total recording time  was 387 minutes and total sleep time was 356 minutes. Sleep efficiency  was normal at 92%. Sleep onset latency was immediate at less than 1  minute and REM latency was increased at 149 minutes. There were 12  awakenings during the study. The duration of wakefulness after sleep  onset was 30 minutes. The amount of N1 sleep was 15% of total sleep  time or 54 minutes. The amount of N2 sleep was 72% of total sleep time  or 256 minutes. REM sleep was 12% of total sleep time or 43 minutes. Slow wave sleep was 1% of total sleep time or 4 minutes. Microarousal  index was increased at 20; arousals were mostly related to respiratory  events. RESPIRATORY MONITORING:  This study documented 15 obstructive apneas, 0  central apneas, 0 mixed apneas, 167 hypopneas, and 2 respiratory  effort-related arousals (RERAs) during the total recorded sleep time. The overall apnea/hypopnea index (AHI) was 30.7. The overall  respiratory disturbance index (RDI includes RERAs) was 31. The non-REM  RDI was 26.8 and the REM RDI was 62. The patient slept in the supine  position for the entirety of the study. The average oxyhemoglobin  saturation was 95% while awake, 93% during non-REM sleep, and 92% during  REM sleep. The overall 3% oxygen desaturation index during sleep was  30.8. The lowest oxygen saturation during sleep was 74%, which occurred  during non-REM sleep. Oxygen saturations were less than or equal to 88%  for 37 minutes or 10% of the total sleep time. Snoring ranged from mild  to loud. EEG:  No abnormal epileptiform activity was observed. ECG:  The electrocardiogram documented normal sinus rhythm with rare  PVCs. The average heart rate during sleep was 61 beats per minute.     PERIODIC LIMB MOVEMENTS:  No significant periodic limb movements were  noted. EMG/VIDEO MONITORING:  Loss of REM atonia, bruxism, or parasomnias were  not observed. IMPRESSION:  1. This study is consistent with severe obstructive sleep apnea that  worsens significantly in REM sleep. Of note, all sleep was in the  supine position. 2.  A split-night study was not performed due to an AHI less than 40 at the  mid point of the study. RECOMMENDATIONS:  1. A full-night in-lab titration study is recommended in order to  determine optimal PAP therapy settings and ensure normalization of  oxygen saturations for the patient's severe obstructive sleep apnea. This should be ordered by the referring provider. 2.  The patient should be counseled against driving while drowsy. 3.  Weight loss efforts should be encouraged, as the severity of  obstructive sleep apnea may improve, although no guarantee of cure can  be made.         Kady Gilbert MD      D: 02/03/2020 22:51:05       T: 02/04/2020 0:48:24     [email or web address]  Job#: [id number]     Doc#: [id number]    CC:

## 2020-02-12 ENCOUNTER — TELEPHONE (OUTPATIENT)
Dept: PULMONOLOGY | Age: 55
End: 2020-02-12

## 2020-02-26 ENCOUNTER — TELEPHONE (OUTPATIENT)
Dept: PAIN MANAGEMENT | Age: 55
End: 2020-02-26

## 2020-02-26 NOTE — TELEPHONE ENCOUNTER
2-26-20-received denial from worker's comp for Randal's next ordered procedure with Dr Masha Barrera. Call to Javier Pierce to notify him that an appeal paper will be sent and that his procedure day of 3-5-20 will be cancelled. Will notify Javier Pierce when the appeal comes back. He shows an understanding.     Mohamud Mackenzie RN  Pain Management

## 2020-03-06 ENCOUNTER — HOSPITAL ENCOUNTER (OUTPATIENT)
Dept: PULMONOLOGY | Age: 55
Discharge: HOME | End: 2020-03-06
Payer: COMMERCIAL

## 2020-03-06 ENCOUNTER — HOSPITAL ENCOUNTER (OUTPATIENT)
Dept: CT IMAGING | Age: 55
Discharge: HOME | End: 2020-03-08
Payer: COMMERCIAL

## 2020-03-06 PROCEDURE — 94060 EVALUATION OF WHEEZING: CPT | Performed by: INTERNAL MEDICINE

## 2020-03-06 PROCEDURE — 94729 DIFFUSING CAPACITY: CPT

## 2020-03-06 PROCEDURE — 71250 CT THORAX DX C-: CPT

## 2020-03-06 PROCEDURE — 94060 EVALUATION OF WHEEZING: CPT

## 2020-03-06 PROCEDURE — 94726 PLETHYSMOGRAPHY LUNG VOLUMES: CPT | Performed by: INTERNAL MEDICINE

## 2020-03-06 PROCEDURE — 94726 PLETHYSMOGRAPHY LUNG VOLUMES: CPT

## 2020-03-06 PROCEDURE — 94729 DIFFUSING CAPACITY: CPT | Performed by: INTERNAL MEDICINE

## 2020-03-13 ENCOUNTER — OFFICE VISIT (OUTPATIENT)
Dept: PULMONOLOGY | Age: 55
End: 2020-03-13
Payer: COMMERCIAL

## 2020-03-13 VITALS
HEART RATE: 56 BPM | OXYGEN SATURATION: 98 % | TEMPERATURE: 98.1 F | SYSTOLIC BLOOD PRESSURE: 134 MMHG | RESPIRATION RATE: 12 BRPM | HEIGHT: 69 IN | WEIGHT: 216 LBS | BODY MASS INDEX: 31.99 KG/M2 | DIASTOLIC BLOOD PRESSURE: 90 MMHG

## 2020-03-13 PROCEDURE — 99213 OFFICE O/P EST LOW 20 MIN: CPT | Performed by: INTERNAL MEDICINE

## 2020-03-13 NOTE — PROGRESS NOTES
4 mos follow up in office today; no complaints. Plan for follow up in 6 mos; appt given. AVS printed and given to pt.

## 2020-03-13 NOTE — PROGRESS NOTES
DULoxetine (CYMBALTA) 30 MG extended release capsule Take 30 mg by mouth daily Take with 60mg dose  0    dorzolamide (TRUSOPT) 2 % ophthalmic solution 2 drops 3 times daily      brimonidine (ALPHAGAN) 0.2 % ophthalmic solution INSTILL ONE DROP TWO TIMES EVERY DAY INTO BOTH EYES.  3    doxepin (SINEQUAN) 50 MG capsule 50 mg as needed       VENTOLIN  (90 Base) MCG/ACT inhaler as needed       latanoprost (XALATAN) 0.005 % ophthalmic solution Place 1 drop into both eyes nightly       BREO ELLIPTA 200-25 MCG/INH AEPB inhaler       pregabalin (LYRICA) 100 MG capsule Take 1 capsule by mouth 2 times daily for 30 days. 60 capsule 2    albuterol sulfate HFA (PROAIR HFA) 108 (90 Base) MCG/ACT inhaler Inhale 2 puffs into the lungs every 4 hours as needed for Wheezing or Shortness of Breath 1 Inhaler 6    DULoxetine (CYMBALTA) 60 MG extended release capsule Take 60 mg by mouth daily Take with 30mg dose      ARNUITY ELLIPTA 200 MCG/ACT AEPB INHALE ONE PUFF BY MOUTH EVERY DAY AT THE SAME TIME EACH DAY  6     No current facility-administered medications for this visit.         SOCIAL AND OCCUPATIONAL HEALTH:  Social History     Tobacco Use   Smoking Status Never Smoker   Smokeless Tobacco Never Used     TB :No   Pets dog   Industrial exposure:as in H&P  Birds :no    SURGICAL HISTORY:   Past Surgical History:   Procedure Laterality Date    ANESTHESIA NERVE BLOCK Bilateral 1/14/2020    BILATERAL CERVICAL MEDIAL BRANCH NERVE BLOCK UNDER FLUOROSCOPIC GUIDANCE AT C2,C3 AND C4 WITHOUT SEDATION (CPT 43655,81384) performed by Yi Velarde MD at [address] [place] [address]  11/07/2018    LAPAROSCOPIC APPENDECTOMY  5/23/2016    NERVE BLOCK Bilateral 01/14/2020    cervical medial branch nerve block    ROTATOR CUFF REPAIR Bilateral     SHOULDER SURGERY Bilateral 1998 r 2007 left       FAMILY HISTORY:   Lung cancer:dad has lung cancer  DVT or PE no     REVIEW OF SYSTEMS:  Constitutional: General health is good . There has been no weight changes. No fevers, fatigue or weakness. Head: Patient denies any history of trauma, convulsive disorder or syncope. Skin:  Patient denies history of changes in pigmentation, eruptions or pruritus. No easy bruising or bleeding. EENT: no nasal congestion   Cardiovascular ,No chest pain ,No edema ,  Respiration:SOB:+   ,REDD :+  Gastrointestinal:No GI bleed ,no melena  ,no hematemesis    Musculoskeletal: no joint pain ,no swelling  Neurological:no , syncope. Denies twitching, convulsions, loss of consciousness or memory. Endocrine:  . No history of goiter, exophthalmos or dryness of skin. The patient has no history of diabetes. Hematopoietic:  No history of bleeding disorders or easy bruising. Rheumatic:  No connective tissue disease history or polyarthritis/inflammatory joint disease. PHYSICAL EXAMINATION:  Vitals:    03/13/20 1106   BP: (!) 134/90   Pulse: 56   Resp: 12   Temp: 98.1 °F (36.7 °C)   SpO2: 98%     Constitutional: This is a well developed, well nourished 48y.o. year old male who is alert, oriented, cooperative and in no apparent distress. Head was normocephalic and atraumatic. EENT: Mallampati class :  Extraocular muscles intact. External canals are patent and no discharge was appreciated. Septum was midline,   mucosa was without erythema, exudates or cobblestoning. No thrush was noted. Neck: Supple without thyromegaly. No elevated JVP. Trachea was midline. No carotid bruits were auscultated. Respiratory: no rhonchi     Cardiovascular: Regular without murmur, clicks, gallops or rubs. There is no left or right ventricular heave. Pulses:  Carotid, radial and femoral pulses were equally bilaterally. Abdomen: Slightly rounded and soft without organomegaly. No rebound, rigidity or guarding was appreciated. Lymphatic: No lymphadenopathy. Musculoskeletal:no joint deformity    Extremities: no edema  Skin:  Warm and dry.

## 2020-03-19 ENCOUNTER — OFFICE VISIT (OUTPATIENT)
Dept: PHYSICAL MEDICINE AND REHAB | Age: 55
End: 2020-03-19
Payer: COMMERCIAL

## 2020-03-19 VITALS
SYSTOLIC BLOOD PRESSURE: 128 MMHG | BODY MASS INDEX: 32.7 KG/M2 | HEART RATE: 58 BPM | WEIGHT: 220.8 LBS | HEIGHT: 69 IN | DIASTOLIC BLOOD PRESSURE: 85 MMHG

## 2020-03-19 PROCEDURE — 99214 OFFICE O/P EST MOD 30 MIN: CPT | Performed by: PHYSICAL MEDICINE & REHABILITATION

## 2020-03-19 RX ORDER — PREGABALIN 100 MG/1
100 CAPSULE ORAL 2 TIMES DAILY
Qty: 60 CAPSULE | Refills: 2 | Status: SHIPPED
Start: 2020-03-19 | End: 2020-06-26 | Stop reason: SDUPTHER

## 2020-03-19 NOTE — Clinical Note
Seems like Gil Sprague probably doesn't need to see me anymore. He had great relief with the MBB you did. It helped so much he decided to hold off on the Botox which I am fine with. All I am doing is refilling his Lyrica every 3 months. If you are comfortable taking that over I would be ok transferring his care to you. If not, no worries I just don't want to waste his time and money seeing both of us if it isn't necessary. If you don't want to do it I will continue to see him for the refills no problem. Thanks.

## 2020-03-19 NOTE — PROGRESS NOTES
cervical paraspinals. Spurling is positive left. Cervical AROM flexion is 60*, extension is 20*, lateral flexion is 30* bilaterally, ,rotation is 60* right 50* left  Shoulder: No edema, erythema, ecchymosis, effusion, instability, mass or deformity. Shoulder AROM is full. No painful arc. No crepitus. No tenderness to palpation at the acromioclavicular joint, subacromial space or biceps tendon insertion. Negative Esequiel-Koch, Scarf,  Drop arm, and Speeds. Neurologic: Awake, alert and oriented in three planes. Speech is fluent. No facial weakness. Tongue is midline. Hearing is intact for conversation. Pupils are equal and round. Extraocular muscles are intact. Hearing is intact for conversation. Shoulder shrug symmetric. Sensation: Intact for light touch and pin prick in all upper and lower extremity dermatomes. Vibration and proprioception are intact at the bilateral first MTP. Strength: 4/5 left shoulder abduction, otherwise, 5/5 in all myotomes in the upper and lower extremities. Muscle Tendon Reflexes: 1+ symmetric in the bilateral upper and 2+ lower extremities. Babinski is downgoing bilaterally. Jean Sprang is negative bilaterally. Gait is Normal.   Romberg is negative. Heel and toe walk are normal.  Tandem walk is normal.  No clonus or spasticity. The patient was able to rise from a chair and squat without difficulty. There is no tremor. Impression:   1. Herniated nucleus pulposus, C4-5    2. C6 radiculopathy    3. Neck sprain, sequela    4. Chronic shoulder pain, unspecified laterality        Plan:  Continued follow up with pain management. Continue rehabilitation. Orders Placed This Encounter   Medications    pregabalin (LYRICA) 100 MG capsule     Sig: Take 1 capsule by mouth 2 times daily for 30 days.      Dispense:  60 capsule     Refill:  2     Controlled Substance Monitoring:    Acute and Chronic Pain Monitoring:   RX Monitoring 3/19/2020   Periodic Controlled Substance Monitoring No signs of potential drug abuse or diversion identified. Continued follow up with psychology and psychiatry. Pending mental health clearance for vocational rehabilitation. The patient was educated about the diagnosis, prognosis, indications, risks and benefits of treatment. An opportunity to ask questions was given to the patient and questions were answered. The patient agreed to proceed with the recommended treatment as described above. Follow up 3 months. Thank you for allowing me to participate in the care of your patient. Jewel Liang D.O., P.T.   Board Certified Physical Medicine and Rehabilitation  Board Certified Electrodiagnostic Medicine

## 2020-03-23 ENCOUNTER — TELEPHONE (OUTPATIENT)
Dept: PAIN MANAGEMENT | Age: 55
End: 2020-03-23

## 2020-03-24 ENCOUNTER — VIRTUAL VISIT (OUTPATIENT)
Dept: PAIN MANAGEMENT | Age: 55
End: 2020-03-24

## 2020-03-24 NOTE — PROGRESS NOTES
Date of Visit:  3/24/2020     CC:      Consent:  He and/or health care decision maker is aware that that he may receive a bill for this telephone service, depending on his insurance coverage, and has provided verbal consent to proceed: Yes     HPI:  Documentation:  I communicated with the patient and/or health care decision maker about neck pain. Details of this discussion including any medical advice provided: yes  Pain is unchanged. Appropriate analgesia with current medications regimen: yes - . Change in quality of symptoms:no. Medication side effects:none. Recent diagnostic testing:none. Recent interventional procedures:none.     Imaging studies:                                        Potential Aberrant Drug-Related Behavior:       Urine Drug Screening:     OARRS report[de-identified]     Past Medical History: Reviewed     Past Surgical History: Reviewed      Home Medications: Reviewed     Allergies: Reviewed      Social History: Reviewed      REVIEW OF SYSTEMS:      Orlando Chun denies fever/chills, chest pain, shortness of breath, new bowel or bladder complaints.  All other review of systems was negative.     PHYSICAL EXAMINATION:      General:        A & O x3     Lungs:     Breathing:Breathing Pattern: regular, no distress        Impression:     Chronic neck pain with radiation to the Left upper extremity that started after a work related injury in 2016  Patient had seen Sadie Cabot but he didn't recommend surgery  Patient had seen [address], [address] at [facility] clinic and had underwent C4-5/C5-6 anterior fusion 11/2018              Plan:  Telephone encounter for his neck pain  Awaiting insurance approved for bilateral C2,3,4 MBB   Currently seeing Matthieu Dobbins and is on Lyrica/Voltaren/Cymbalta/Flexeril  OARRS report reviewed  Patient encouraged to stay active   Treatment plan discussed with the patient including medications and procedure side effects        We discussed with the patient that combining opioids, benzodiazepines, often with soap and water for at least 20 seconds, especially after blowing your nose, coughing, or sneezing; going to the bathroom; and before eating or preparing food. · Hand : If soap and water are not readily available, use an alcohol-based hand  with at least 60% alcohol. · Avoid touching: Avoid touching your eyes, nose, and mouth with unwashed hands.     Handwashing Tips   · Wet your hands with clean, running water (warm or cold), turn off the tap, and apply soap. · Lather your hands by rubbing them together with the soap. Lather the backs of your hands, between your fingers, and under your nails. · Scrub your hands for at least 20 seconds. Need a timer? Hum the [place] from beginning to end twice. · Rinse your hands well under clean, running water. · Dry your hands using a clean towel or air dry them.     Monitor your symptoms      Seek medical attention: Seek prompt medical attention if your illness is worsening (e.g., difficulty breathing).    · Call your doctor: Before seeking care, call your healthcare provider and tell them that you have, or are being evaluated for, COVID-19. · Wear a facemask when sick: Put on a facemask before you enter the facility. These steps will help the healthcare provider's office to keep other people in the office or waiting room from getting infected or exposed. · Call 911 if you have a medical emergency: If you have a medical emergency and need to call 911, notify the dispatch personnel that you have, or are being evaluated for COVID-19.  If possible, put on a facemask before emergency medical services arrive.         I affirm this is a Patient Initiated Episode with an Established Patient who has not had a related appointment within my department in the past 7 days or scheduled within the next 24 hours.     Total Time: minutes: 5-10 minutes     Note: not billable if this call serves to triage the patient into an appointment for the

## 2020-05-29 ENCOUNTER — OFFICE VISIT (OUTPATIENT)
Dept: PAIN MANAGEMENT | Age: 55
End: 2020-05-29
Payer: COMMERCIAL

## 2020-05-29 VITALS
HEART RATE: 76 BPM | TEMPERATURE: 98.2 F | DIASTOLIC BLOOD PRESSURE: 72 MMHG | HEIGHT: 69 IN | WEIGHT: 220 LBS | SYSTOLIC BLOOD PRESSURE: 122 MMHG | RESPIRATION RATE: 16 BRPM | BODY MASS INDEX: 32.58 KG/M2

## 2020-05-29 PROBLEM — M17.0 PRIMARY OSTEOARTHRITIS OF BOTH KNEES: Status: ACTIVE | Noted: 2020-05-29

## 2020-05-29 PROCEDURE — 99213 OFFICE O/P EST LOW 20 MIN: CPT | Performed by: PAIN MEDICINE

## 2020-05-29 PROCEDURE — 20610 DRAIN/INJ JOINT/BURSA W/O US: CPT | Performed by: PAIN MEDICINE

## 2020-05-29 PROCEDURE — 99212 OFFICE O/P EST SF 10 MIN: CPT | Performed by: PAIN MEDICINE

## 2020-05-29 PROCEDURE — 6360000002 HC RX W HCPCS

## 2020-05-29 RX ORDER — METHYLPREDNISOLONE ACETATE 40 MG/ML
40 INJECTION, SUSPENSION INTRA-ARTICULAR; INTRALESIONAL; INTRAMUSCULAR; SOFT TISSUE ONCE
Status: COMPLETED | OUTPATIENT
Start: 2020-05-29 | End: 2020-05-29

## 2020-05-29 RX ORDER — BUPIVACAINE HYDROCHLORIDE 2.5 MG/ML
30 INJECTION, SOLUTION INFILTRATION; PERINEURAL ONCE
Status: COMPLETED | OUTPATIENT
Start: 2020-05-29 | End: 2020-05-29

## 2020-05-29 RX ADMIN — BUPIVACAINE HYDROCHLORIDE 5 ML: 2.5 INJECTION, SOLUTION INFILTRATION; PERINEURAL at 16:12

## 2020-05-29 RX ADMIN — METHYLPREDNISOLONE ACETATE 40 MG: 40 INJECTION, SUSPENSION INTRA-ARTICULAR; INTRALESIONAL; INTRAMUSCULAR; SOFT TISSUE at 15:50

## 2020-05-29 RX ADMIN — METHYLPREDNISOLONE ACETATE 40 MG: 40 INJECTION, SUSPENSION INTRA-ARTICULAR; INTRALESIONAL; INTRAMUSCULAR; SOFT TISSUE at 15:55

## 2020-05-29 NOTE — PROGRESS NOTES
[address] [facility], [address] [place] [address] Paul  [phone]    Follow up Note      Pricila Lynn     Date of Visit:  5/29/2020    CC:  Patient presents for follow up   Chief Complaint   Patient presents with    Follow-up    Neck Pain    Knee Pain     bilateral knee pain     HPI:  Follow up on his neck pain with no acute issues. Change in quality of symptoms:none  Medication side effects:not applicable . Recent diagnostic testing:none. Recent interventional procedures:none    He has not been on anticoagulation medications to include none. The patient  has not been on herbal supplements. The patient is not diabetic. Imaging: cervical spine MRI 12/2017      1. Straightening of the cervical spine with loss of normal lordosis. 2. No compression fracture or spondylolisthesis. 3. Multilevel mild to moderate degenerative disc disease of the   cervical spine as detailed above. 4. Mild to moderate facet joint arthropathy at left C2-C3.      Left knee Xray 2012  Arthritis    Previous treatments: Physical Therapy, Epidural Steroid Injection 2016, per patient it had helped and medications. .      Urine screen:   None no opioids started     OARRS report 05/2020 consistent     Past Medical History:   Diagnosis Date    Asthma     due to exposure to chemical    Glaucoma     Neck pain      Past Surgical History:   Procedure Laterality Date    ANESTHESIA NERVE BLOCK Bilateral 1/14/2020    BILATERAL CERVICAL MEDIAL BRANCH NERVE BLOCK UNDER FLUOROSCOPIC GUIDANCE AT C2,C3 AND C4 WITHOUT SEDATION (CPT 18961,96319) performed by Veronique Mahajan MD at [address] [place] [address]  11/07/2018    LAPAROSCOPIC APPENDECTOMY  5/23/2016    NERVE BLOCK Bilateral 01/14/2020    cervical medial branch nerve block    ROTATOR CUFF REPAIR Bilateral     SHOULDER SURGERY Bilateral 1998 r 2007 left     Prior to Admission medications    Medication Sig Start Date Physical activity     Days per week: Not on file     Minutes per session: Not on file    Stress: Not on file   Relationships    Social connections     Talks on phone: Not on file     Gets together: Not on file     Attends [employer] service: Not on file     Active member of club or organization: Not on file     Attends meetings of clubs or organizations: Not on file     Relationship status: Not on file    Intimate partner violence     Fear of current or ex partner: Not on file     Emotionally abused: Not on file     Physically abused: Not on file     Forced sexual activity: Not on file   Other Topics Concern    Not on file   Social History Narrative    Not on file     Family History   Problem Relation Age of Onset    Asthma Mother     High Blood Pressure Mother     Cancer Father         Lung    High Blood Pressure Father     Glaucoma Sister     Glaucoma Brother     Other Brother         Sarcoidosis of Lungs    Diabetes Paternal Grandmother     Heart Attack Paternal Grandfather      REVIEW OF SYSTEMS:     Mayi Bowers denies fever/chills, chest pain, shortness of breath, new bowel or bladder complaints. All other review of systems was negative. PHYSICAL EXAMINATION:      /72   Pulse 76   Temp 98.2 °F (36.8 °C) (Oral)   Resp 16   Ht 5' 9\" (1.753 m)   Wt 220 lb (99.8 kg)   BMI 32.49 kg/m²     General:       General appearance: awake, alert, oriented   pleasant and well-hydrated. , in moderate discomfort and A & O x3  Build:Normal Weight  Function:Rises from a seated position easily.     HEENT:     Head:normocephalic and atraumatic  Pupils:regular, round and equal.  Sclera: icterus absent,      Lungs:     Breathing:Breathing Pattern: regular, no distress     Abdomen:     Shape:non-distended and normal  Scars:yes  Tenderness:none  Guarding:none     Cervical spine:     Inspection:anterior fusion scar  Palpation:tenderness paravertebral muscles, facet loading, left, right and positive. (Left worse than

## 2020-05-30 NOTE — PROGRESS NOTES
phone: Not on file     Gets together: Not on file     Attends Church service: Not on file     Active member of club or organization: Not on file     Attends meetings of clubs or organizations: Not on file     Relationship status: Not on file    Intimate partner violence     Fear of current or ex partner: Not on file     Emotionally abused: Not on file     Physically abused: Not on file     Forced sexual activity: Not on file   Other Topics Concern    Not on file   Social History Narrative    Not on file     Family History   Problem Relation Age of Onset    Asthma Mother     High Blood Pressure Mother     Cancer Father         Lung    High Blood Pressure Father     Glaucoma Sister     Glaucoma Brother     Other Brother         Sarcoidosis of Lungs    Diabetes Paternal Grandmother     Heart Attack Paternal Grandfather      REVIEW OF SYSTEMS:     Griffin Martinez denies fever/chills, chest pain, shortness of breath, new bowel or bladder complaints. All other review of systems was negative. PHYSICAL EXAMINATION:      General:       General appearance: awake, alert, oriented   pleasant and well-hydrated. , in moderate discomfort and A & O x3  Build:Normal Weight  Function:Rises from a seated position easily.     HEENT:     Head:normocephalic and atraumatic  Pupils:regular, round and equal.  Sclera: icterus absent,      Lungs:     Breathing:Breathing Pattern: regular, no distress      Knee: Inspection:symmetric, swelling mild left swollen tibial tubersoity biaterally.   Tenderness of Bony Landmarks:Medial, bilateral  Drawer Test:negative  Effusion:present left  Crepitus:absent bilaterally   ROM:Left limited by pain  Right limited by pain      Extremities:     Tremors:None bilaterally upper and lower  Range of motion:Generally normal shoulders  Intact:Yes  Gait:normal     Dermatology:     Skin:no unusual rashes and no skin lesions     Assessment/Plan:   Chronic neck pain with radiation to  the Left upper

## 2020-06-09 ENCOUNTER — TELEPHONE (OUTPATIENT)
Dept: PAIN MANAGEMENT | Age: 55
End: 2020-06-09

## 2020-06-11 ENCOUNTER — OFFICE VISIT (OUTPATIENT)
Dept: ORTHOPEDIC SURGERY | Age: 55
End: 2020-06-11
Payer: COMMERCIAL

## 2020-06-11 VITALS — BODY MASS INDEX: 31.84 KG/M2 | HEIGHT: 69 IN | TEMPERATURE: 97 F | WEIGHT: 215 LBS

## 2020-06-11 PROCEDURE — 99203 OFFICE O/P NEW LOW 30 MIN: CPT | Performed by: ORTHOPAEDIC SURGERY

## 2020-06-11 RX ORDER — DICLOFENAC SODIUM 75 MG/1
75 TABLET, DELAYED RELEASE ORAL 2 TIMES DAILY
Qty: 60 TABLET | Refills: 3 | Status: SHIPPED | OUTPATIENT
Start: 2020-06-11

## 2020-06-11 NOTE — PROGRESS NOTES
SURGERY Bilateral 1998 r 2007 left       Current Outpatient Medications   Medication Sig Dispense Refill    diclofenac (VOLTAREN) 75 MG EC tablet Take 1 tablet by mouth 2 times daily 60 tablet 3    pregabalin (LYRICA) 100 MG capsule Take 1 capsule by mouth 2 times daily for 30 days. 60 capsule 2    BREO ELLIPTA 200-25 MCG/INH AEPB inhaler       budesonide-formoterol (SYMBICORT) 160-4.5 MCG/ACT AERO Inhale 2 puffs into the lungs 2 times daily Rinse mouth after use. 1 Inhaler 5    albuterol sulfate HFA (PROAIR HFA) 108 (90 Base) MCG/ACT inhaler Inhale 2 puffs into the lungs every 4 hours as needed for Wheezing or Shortness of Breath 1 Inhaler 6    dorzolamide (TRUSOPT) 2 % ophthalmic solution 2 drops 3 times daily      brimonidine (ALPHAGAN) 0.2 % ophthalmic solution INSTILL ONE DROP TWO TIMES EVERY DAY INTO BOTH EYES.  3    doxepin (SINEQUAN) 50 MG capsule 50 mg as needed       latanoprost (XALATAN) 0.005 % ophthalmic solution Place 1 drop into both eyes nightly       ARNUITY ELLIPTA 200 MCG/ACT AEPB INHALE ONE PUFF BY MOUTH EVERY DAY AT THE SAME TIME EACH DAY  6     No current facility-administered medications for this visit.         Allergies   Allergen Reactions    Lipitor Anaphylaxis    Sulfa Antibiotics Anaphylaxis       Social History     Socioeconomic History    Marital status:      Spouse name: None    Number of children: None    Years of education: None    Highest education level: None   Occupational History    None   Social Needs    Financial resource strain: None    Food insecurity     Worry: None     Inability: None    Transportation needs     Medical: None     Non-medical: None   Tobacco Use    Smoking status: Never Smoker    Smokeless tobacco: Never Used   Substance and Sexual Activity    Alcohol use: Yes     Comment: rarely    Drug use: No    Sexual activity: Yes   Lifestyle    Physical activity     Days per week: None     Minutes per session: None    Stress: None complete loss of the patellofemoral joint space patellar sclerosis and osteophyte formation. Impression: Primary patellofemoral arthritis severe bilateral knees especially on the left. ASSESSMENT/PLAN:    Eliot Parsons was seen today for knee pain. Diagnoses and all orders for this visit:    Primary osteoarthritis of both knees  -     XR Knee Bilateral Standing; Future  -     XR KNEE LEFT (1-2 VIEWS); Future  -     XR KNEE RIGHT (1-2 VIEWS); Future  -     [facility], Sheri Patella    Other orders  -     diclofenac (VOLTAREN) 75 MG EC tablet; Take 1 tablet by mouth 2 times daily    Findings and images were reviewed with the patient in detail. He has severe patellofemoral arthritis. Surgical treatment would require arthroplasty for which she is rather young and he does not wish to go that far. To optimize nonoperative management recommend he see physical therapy to structure an appropriate knee exercise program.    Also suggest switching from OTC NSAIDs to trial of Voltaren 75 mg twice daily with meals and GI precautions. May also use Tylenol and OTC doses. Return in about 6 weeks (around 7/23/2020).        Shahnaz Dorantes MD    6/11/2020  10:08 AM

## 2020-06-26 ENCOUNTER — OFFICE VISIT (OUTPATIENT)
Dept: PHYSICAL MEDICINE AND REHAB | Age: 55
End: 2020-06-26
Payer: COMMERCIAL

## 2020-06-26 VITALS
HEIGHT: 69 IN | DIASTOLIC BLOOD PRESSURE: 86 MMHG | HEART RATE: 59 BPM | SYSTOLIC BLOOD PRESSURE: 132 MMHG | BODY MASS INDEX: 32.44 KG/M2 | WEIGHT: 219 LBS

## 2020-06-26 PROCEDURE — 99214 OFFICE O/P EST MOD 30 MIN: CPT | Performed by: PHYSICAL MEDICINE & REHABILITATION

## 2020-06-26 RX ORDER — DULOXETIN HYDROCHLORIDE 30 MG/1
30 CAPSULE, DELAYED RELEASE ORAL DAILY
COMMUNITY
Start: 2020-05-18

## 2020-06-26 RX ORDER — PREGABALIN 100 MG/1
100 CAPSULE ORAL 2 TIMES DAILY
Qty: 60 CAPSULE | Refills: 2 | Status: SHIPPED
Start: 2020-06-26 | End: 2020-11-02 | Stop reason: SDUPTHER

## 2020-06-26 RX ORDER — DULOXETIN HYDROCHLORIDE 60 MG/1
60 CAPSULE, DELAYED RELEASE ORAL DAILY
COMMUNITY
Start: 2020-05-18

## 2020-06-26 NOTE — PROGRESS NOTES
Faisal Villafuerte D.O., P.T. [facility] Physical Medicine and Rehabilitation  [address] [facility], [address] [place] [address]  Phone: [phone]  Fax: [phone]      6/26/2020    [facility] Claim #: [id number]   [facility] DOI: 4/19/16   [facility] POR:Dr. Danisha Engel  [facility] ATTY: Lainey Negrete [address], & Chandrakant [employer], LPA. [facility] Alllowed conditions:  W64.029I CONTUSION OF LEFT SHOULDER LEFT, S60.221A CONTUSION OF RIGHT HAND RIGHT,  L40.869O UNSPECIFIED SPRAIN OF LEFT SHOULDER JOINT LEFT, H60.522 ACUTE CHEMICAL OTITIS EXTERNA, LEFT EAR, S13. 4XXA SPRAIN OF LIGAMENTS OF CERVICAL SPINE, S46.012A FULL THICKNESS ROTATOR CUFF TEAR SHOULDER  LEFT, S46.112A RUPTURED LONG HEAD BICEP TENDON SHOULDER  LEFT, M19.012 SUB AGG ACROMIOCLAVICULAR ARTHROSIS SHOULDER  LEFT, M54.12 RADICULOPATHY, CERVICAL REGION  C6.    [facility] Dismissed Conditions:  M18.11 ARTHROSIS OF THE FIRST METACARPOPHALANGEAL JOINT HAND  RIGHT, M65.841 TENOSYNOVITIS HAND  RIGHT, S62.614S DISP FX OF PROXIMAL PHALANX OF RIGHT RING FINGER, SEQUELA RIGHT, S46.012A PARTIAL THICKNESS ROTATOR CUFF TEAR SHOULDER  LEFT. Chief Complaint   Patient presents with    Neck Pain     WC follow up   HPI:  Since the last visit the patient is waiting on approval of injections by [facility] for his neck by Dr. Aby Hyatt. Today, he has pain in his neck and radiating to his right bicep and is described as burning, aching. Pain is currently rated Pain Score:   5. There is associated cervical crepitus, stiffness. The neck pain is worse with movement, better with Flexeril, Cymbalta, Lyrica, Diclofenac. There is associated weakness, paresthesias. Past Medical History:   Diagnosis Date    Asthma     due to exposure to chemical    Glaucoma     Neck pain      The injured worker denies any prior injury to the same body area injured in the claim.      Past Surgical History:   Procedure Laterality Date    ANESTHESIA NERVE BLOCK Bilateral 1/14/2020    BILATERAL CERVICAL MEDIAL BRANCH NERVE BLOCK

## 2020-06-30 ENCOUNTER — HOSPITAL ENCOUNTER (OUTPATIENT)
Dept: PHYSICAL THERAPY | Age: 55
Setting detail: THERAPIES SERIES
Discharge: HOME | End: 2020-06-30
Payer: COMMERCIAL

## 2020-06-30 PROCEDURE — 97161 PT EVAL LOW COMPLEX 20 MIN: CPT | Performed by: PHYSICAL THERAPIST

## 2020-06-30 ASSESSMENT — PAIN - FUNCTIONAL ASSESSMENT: PAIN_FUNCTIONAL_ASSESSMENT: PREVENTS OR INTERFERES WITH MANY ACTIVE NOT PASSIVE ACTIVITIES

## 2020-06-30 ASSESSMENT — PAIN DESCRIPTION - ORIENTATION: ORIENTATION: RIGHT;LEFT

## 2020-06-30 ASSESSMENT — PAIN DESCRIPTION - PAIN TYPE: TYPE: CHRONIC PAIN

## 2020-06-30 ASSESSMENT — PAIN DESCRIPTION - LOCATION: LOCATION: KNEE

## 2020-06-30 ASSESSMENT — PAIN DESCRIPTION - DESCRIPTORS: DESCRIPTORS: ACHING;CONSTANT

## 2020-06-30 ASSESSMENT — PAIN SCALES - GENERAL: PAINLEVEL_OUTOF10: 4

## 2020-06-30 NOTE — PROGRESS NOTES
[address] [facility]                Phone: [phone]   Fax: [phone]    Physical Therapy Daily Treatment Note  Date:  2020    Patient Name:  Angle Garcia    :  1965  MRN: [id number]    Evaluating therapist:  MIGNON Camarillo                   (20)  Restrictions/Precautions:    Diagnosis:  OA B knees  Treatment Diagnosis:    Insurance/Certification information:  [facility]  Referring Physician:  Dhiraj Harden of janice signed (Y/N):    Visit# / total visits:    Pain level: 4/10   Time In:  Time Out:    Subjective:      Exercises:  Exercise/Equipment Resistance/Repetitions Other comments   StepOne              Total Gym squats            NK flex/ext            toe raises     step ups             side      standing hip/knee ext                                                                       Other Therapeutic Activities:      Home Exercise Program:      Manual Treatments:      Modalities:  IFC/MH to B knees PRN     Timed Code Treatment Minutes: Total Treatment Minutes:      Treatment/Activity Tolerance:  [] Patient tolerated treatment well [] Patient limited by fatique  [] Patient limited by pain  [] Patient limited by other medical complications  [] Other:     Prognosis: [] Good [] Fair  [] Poor    Patient Requires Follow-up: [] Yes  [] No    Plan:   [] Continue per plan of care [] Alter current plan (see comments)  [] Plan of care initiated [] Hold pending MD visit [] Discharge  Plan for Next Session:      See Weekly Progress Note: []  Yes  []  No  Next due:        Electronically signed by:   Amanda Middleton

## 2020-06-30 NOTE — PROGRESS NOTES
Physical Therapy  Initial Assessment  Date: 2020  Patient Name: Tien Lance  MRN: [id number]  : 1965        Subjective   General  Chart Reviewed: Yes  Referring Practitioner: Yadiel Carbone   Referral Date : 20  Diagnosis: OA B knees  PT Visit Information  Onset Date: 20  PT Insurance Information: BCBS  Total # of Visits Approved: 6  Total # of Visits to Date: 1  Subjective  Subjective: pt presents to therapy with c/o pain B knees for several yrs of insidious onset; no PMH for B knee/LE injury/sx;  x-ray  B knees + for OA; MEDS help with pain; tells PT he received injections ~ 2 weeks ago with relief noted; no c/o N/T or buckling B LE's; sleep is fine; follow-up noted 3 months   Pain Screening  Patient Currently in Pain: Yes  Pain Assessment  Pain Assessment: 0-10  Pain Level: 4  Pain Type: Chronic pain  Pain Location: Knee  Pain Orientation: Right;Left  Pain Descriptors: Aching;Constant  Functional Pain Assessment: Prevents or interferes with many active not passive activities  Vital Signs  Patient Currently in Pain: Yes    Objective     Observation/Palpation  Palpation: discomfort noted across B jt lines into femoral condyles and along patellar margins    AROM RLE (degrees)  RLE AROM: WNL  AROM LLE (degrees)  LLE AROM : WNL  AROM RUE (degrees)  RUE AROM : WNL  AROM LUE (degrees)  LUE AROM : WNL    Strength Other  Other: grossly 4/5 for all ranges B LE's        Sensation  Overall Sensation Status: WNL     Ambulation  Ambulation?: Yes  Ambulation 1  Surface: level tile  Device: No Device  Assistance: Independent  Quality of Gait: normal mechanics noted B LE's   Balance  Comments: static/dynamic balance is GOOD/GOOD+     Assessment   Conditions Requiring Skilled Therapeutic Intervention  Body structures, Functions, Activity limitations: Decreased strength;Decreased endurance  Assessment: pain noted across B knees with all porlonged activities, 4/10   Prognosis: Fair;Good  Decision Making: Low Complexity  Activity Tolerance  Activity Tolerance: Patient Tolerated treatment well       Plan   Plan  Times per week: pt to be seen 2x/week/3 weeks   Current Treatment Recommendations: ROM, Strengthening, Endurance Training, Modalities, Home Exercise Program    Goals  Time Frame for goals: 3 weeks   goal 1: decrease pain across B knees with all prolonged activities, 0-2/10   goal 2: increase strength across B knees to grossly 5/5 for all ranges improving static/dynamic balance to GOOD+/NORMAL   goal 3: improve endurance for all prolonged activities to GOOD/GOOD+   goal 4: assure I with HEP for home management of condition           Vear Sacks, PT

## 2020-07-02 ENCOUNTER — HOSPITAL ENCOUNTER (OUTPATIENT)
Dept: PHYSICAL THERAPY | Age: 55
Setting detail: THERAPIES SERIES
Discharge: HOME | End: 2020-07-02
Payer: COMMERCIAL

## 2020-07-02 PROCEDURE — 97110 THERAPEUTIC EXERCISES: CPT

## 2020-07-02 PROCEDURE — 97530 THERAPEUTIC ACTIVITIES: CPT

## 2020-07-02 NOTE — PROGRESS NOTES
[address] [facility]                Phone: [phone]   Fax: [phone]    Physical Therapy Daily Treatment Note  Date:  2020    Patient Name:  Isaias Arguello    :  1965  MRN: [id number]    Evaluating therapist:  MIGNON Escoto                   (20)  Restrictions/Precautions:    Diagnosis:  OA B knees  Treatment Diagnosis:    Insurance/Certification information:  [facility]  Referring Physician:  [address] [place] [address] signed (Y/N):    Visit# / total visits:    Pain level: 3-4/10     Time In:     0931  Time Out:  1030    Subjective:  Patient presents for first scheduled treatment session following initial evaluation. He reports pain in his knees as 3-4/10 prior to session this morning. He states his L knee is always a little worse than his R knee. Exercises:  Exercise/Equipment Resistance/Repetitions Other comments   Recumbent bike  10 min L2           Total Gym squats BL 3 x15 L6           NK ext  R/L 2 x15 ea 10#           flex R/L 2 x15 ea 10#         Calf  raises  BL 3 x10 wedge            stretches BL 3 x20s wedge          Rocker board wt shifts 20x ea 3-way         step ups  R/L 20x ea 6\"            side  nt         standing hip/knee ext 20x ea                    hip/knee flex 20x ea                                  Objective:  Ther. exercise/activity as listed per flow sheet above. Assessment:  Patient performs all exercises/activities well with good effort and  pacing. Mild increase in pain noted  following exercises/activities (no numerical value given). Home Exercise Program:  nt    Manual Treatments:      Modalities:   MH to B knees x 12 min    Timed Code Treatment Minutes:       Total Treatment Minutes:      Treatment/Activity Tolerance:  [x] Patient tolerated treatment well [] Patient limited by fatique  [] Patient limited by pain  [] Patient limited by other medical complications  [] Other:     Prognosis: [] Good [] Fair  [] Poor    Patient Requires Follow-up: [] Yes  [] No    Plan:   [x] Continue per plan of care [] Alter current plan (see comments)  [] Plan of care initiated [] Hold pending MD visit [] Discharge    Plan for Next Session:      See Weekly Progress Note: []  Yes  []  No  Next due:        Electronically signed by:  Raquel Nina, [facility]

## 2020-07-06 ENCOUNTER — HOSPITAL ENCOUNTER (OUTPATIENT)
Dept: PHYSICAL THERAPY | Age: 55
Setting detail: THERAPIES SERIES
Discharge: HOME | End: 2020-07-06
Payer: COMMERCIAL

## 2020-07-06 PROCEDURE — G0283 ELEC STIM OTHER THAN WOUND: HCPCS

## 2020-07-06 PROCEDURE — 97530 THERAPEUTIC ACTIVITIES: CPT

## 2020-07-06 PROCEDURE — 97110 THERAPEUTIC EXERCISES: CPT

## 2020-07-06 NOTE — PROGRESS NOTES
[address] [facility]                Phone: [phone]   Fax: [phone]    Physical Therapy Daily Treatment Note  Date:  2020    Patient Name:  Jose C Morales    :  1965  MRN: [id number]    Evaluating therapist:  MIGNON Olivarez                   (20)  Restrictions/Precautions:    Diagnosis:  OA B knees  Treatment Diagnosis:    Insurance/Certification information:  [facility]  Referring Physician:  [facility] signed (Y/N):    Visit# / total visits:  3/6  Pain level: 3-8/10     Time In:     8493  Time Out:   1054      Subjective:  Patient presents for first of two scheduled treatment sessions this week. He reports pain in his knees as 3/10 prior to session this morning. He states his knees are more stiff and sore when he first gets out of bed. Exercises:  Exercise/Equipment Resistance/Repetitions Other comments   Recumbent bike  10 min L2           Total Gym squats BL          Standing squats 3 x10 // bars           NK ext  R/L           flex R/L         Calf  raises  BL 3 x10 wedge            stretches BL 3 x20s wedge          Rocker board wt shifts 20x ea 3-way         step ups  R/L 2 x10 ea 6\"            side  nt         standing hip/knee ext 2 x15 ea                    hip/knee flex 2 x15 ea            Standing HS curls  L/R 2 x15 ea         SLR 2 x10 ea    S/L SLR 2 x10 ea             Objective:  Ther. exercise/activity as listed per flow sheet above. PreMod/MH x 15 minutes both knees    AROM BL knee WNL  Strength BL LE grossly 4/5  Static/dynamic balance is GOOD/GOOD+    Assessment:  Patient performs all exercises/activities well with good effort and  pacing. Mild increase in  pain with activities/exercises of session. Endurance GOOD-/GOOD. Home Exercise Program:  Reviewed with copy provided 20. Manual Treatments:      Modalities:   PreMod/MH to B knees x 15 min    Timed Code Treatment Minutes:       Total Treatment Minutes: Treatment/Activity Tolerance:  [x] Patient tolerated treatment well [] Patient limited by fatique  [] Patient limited by pain  [] Patient limited by other medical complications  [] Other:     Prognosis: [] Good [] Fair  [] Poor    Patient Requires Follow-up: [] Yes  [] No    Plan:   [x] Continue per plan of care [] Alter current plan (see comments)  [] Plan of care initiated [] Hold pending MD visit [] Discharge    Plan for Next Session:      See Weekly Progress Note: []  Yes  []  No  Next due:        Electronically signed by:  Gloria Edwards, [facility]

## 2020-07-14 ENCOUNTER — OFFICE VISIT (OUTPATIENT)
Dept: FAMILY MEDICINE CLINIC | Age: 55
End: 2020-07-14
Payer: COMMERCIAL

## 2020-07-14 VITALS
HEART RATE: 78 BPM | OXYGEN SATURATION: 97 % | WEIGHT: 217 LBS | DIASTOLIC BLOOD PRESSURE: 84 MMHG | HEIGHT: 69 IN | RESPIRATION RATE: 20 BRPM | BODY MASS INDEX: 32.14 KG/M2 | SYSTOLIC BLOOD PRESSURE: 134 MMHG

## 2020-07-14 PROBLEM — E78.2 MIXED HYPERLIPIDEMIA: Status: ACTIVE | Noted: 2020-07-14

## 2020-07-14 PROCEDURE — 99213 OFFICE O/P EST LOW 20 MIN: CPT | Performed by: FAMILY MEDICINE

## 2020-07-14 ASSESSMENT — ENCOUNTER SYMPTOMS
NAUSEA: 0
DIARRHEA: 0
VOMITING: 0
SHORTNESS OF BREATH: 0

## 2020-07-14 ASSESSMENT — PATIENT HEALTH QUESTIONNAIRE - PHQ9
2. FEELING DOWN, DEPRESSED OR HOPELESS: 0
1. LITTLE INTEREST OR PLEASURE IN DOING THINGS: 0
SUM OF ALL RESPONSES TO PHQ QUESTIONS 1-9: 0
SUM OF ALL RESPONSES TO PHQ9 QUESTIONS 1 & 2: 0
SUM OF ALL RESPONSES TO PHQ QUESTIONS 1-9: 0

## 2020-07-14 NOTE — PATIENT INSTRUCTIONS
Patient Education        Learning About [facility]: Overview    Nail care is important for health and appearance. Your loved one can accidentally scratch himself or herself (or you) if their fingernails are too long. Nails that are dirty or too long--especially in a person who usually cared for his or her nails--also can be a sign that a loved one needs more help with personal care. Try to take the person for manicures if that is what your loved one wants. It's a chance to get out and see people and continue a favorite activity. You can do basic nail care at home. Usually all you need to do is keep the nails clean and at a safe length. How do you trim someone's fingernails? Try to trim the person's nails every week. Or check them each week to see if they need to be trimmed. It's easiest to trim nails after the person has had a shower or has washed his or her hands. It makes the nails softer and easier to trim. Start by gathering your supplies. You will need fingernail clippers and a nail file. You may also need nail polish and nail polish remover. To trim the nails:  1. Wash and dry your hands. You don't need to wear gloves. 2. Use nail polish remover to take off any polish. 3. Hold the person's hand steady with one hand while you trim the nails with your other hand. Trim the nails straight across. 4. The nail length can vary depending on the person's taste. But in general, keep the nails even with--or not much longer than--the tip of the finger. 5. Let the nails dry if they are still damp and soft. 6. Use a nail file to gently smooth the edges of the nails, especially at the corners. They may be sharp after the nail is cut straight. 7. Apply nail polish, if the person wants it. What else do you need to know? Hand-washing  If you're helping someone wash their hands, wash the underside of the nails with soap and water. This is easiest with a nail brush.  Remember that nails tend to get harder with age, and the skin on the hands can become thin and dry. So offer the person hand lotion after washing his or her hands. Avoiding infection  When you're caring for someone's nails, it is important to remember not to trim or cut the cuticles. A minor cut in a cuticle could lead to an infection. When you're washing someone's hands or trimming their nails, look for any signs of infection from a cut or other injury. Signs may include pain, swelling, redness, or warmth. This is especially important if the person has diabetes. Call the person's doctor if the cut doesn't heal with home treatment, such as antibiotic ointment and a bandage. Where can you learn more? Go to https://letsmote.compeFlareo.Figment. org and sign in to your [employer] account. Enter F113 in the Basic6 box to learn more about \"Learning About Fingernail Care. \"     If you do not have an account, please click on the \"Sign Up Now\" link. Current as of: December 9, 2019               Content Version: 12.5  © 3051-7888 [facility]. Care instructions adapted under license by [employer] ([facility]). If you have questions about a medical condition or this instruction, always ask your healthcare professional. [address] any warranty or liability for your use of this information. Patient Education        Nail and Nailbed: Anatomy Sketch    Current as of: October 31, 2019               Content Version: 12.5  © 2006-2020 [facility]. Care instructions adapted under license by [employer] ([facility]). If you have questions about a medical condition or this instruction, always ask your healthcare professional. [address] any warranty or liability for your use of this information.

## 2020-07-14 NOTE — PROGRESS NOTES
solution 2 drops 3 times daily      brimonidine (ALPHAGAN) 0.2 % ophthalmic solution INSTILL ONE DROP TWO TIMES EVERY DAY INTO BOTH EYES.  3    doxepin (SINEQUAN) 50 MG capsule 50 mg as needed       latanoprost (XALATAN) 0.005 % ophthalmic solution Place 1 drop into both eyes nightly       albuterol sulfate HFA (PROAIR HFA) 108 (90 Base) MCG/ACT inhaler Inhale 2 puffs into the lungs every 4 hours as needed for Wheezing or Shortness of Breath 1 Inhaler 6     No current facility-administered medications for this visit. Wt Readings from Last 3 Encounters:   07/14/20 217 lb (98.4 kg)   06/26/20 219 lb (99.3 kg)   06/11/20 215 lb (97.5 kg)                   Vitals:    07/14/20 0850   BP: 134/84   Pulse: 78   Resp: 20   SpO2: 97%        Physical Exam  Vitals signs reviewed. Constitutional:       General: He is not in acute distress. Appearance: He is well-developed. Neck:      Musculoskeletal: Neck supple. Vascular: No carotid bruit. Cardiovascular:      Rate and Rhythm: Normal rate and regular rhythm. Heart sounds: Normal heart sounds. No murmur. No friction rub. No gallop. Pulmonary:      Effort: Pulmonary effort is normal. No respiratory distress. Breath sounds: Normal breath sounds. No wheezing or rales. Abdominal:      General: Bowel sounds are normal. There is no distension. Palpations: Abdomen is soft. Tenderness: There is no abdominal tenderness. There is no guarding or rebound. Skin:     General: Skin is warm and dry. Comments: +dark discoloration medial half of right thumbnail; +onycholysis underlying discolored portion of nail   Neurological:      Mental Status: He is alert and oriented to person, place, and time. ASSESSMENT/PLAN  Hussain Duran was seen today for check-up. Diagnoses and all orders for this visit:    Nail discoloration  -     [facility] - Alexis Harper MD, Dermatology, [facility]  -     Comprehensive Metabolic Panel;  Future  -

## 2020-07-22 ENCOUNTER — HOSPITAL ENCOUNTER (OUTPATIENT)
Age: 55
Discharge: HOME | End: 2020-07-24
Payer: COMMERCIAL

## 2020-07-22 LAB
ALBUMIN SERPL-MCNC: 4.3 G/DL (ref 3.5–5.2)
ALP BLD-CCNC: 56 U/L (ref 40–129)
ALT SERPL-CCNC: 52 U/L (ref 0–40)
ANION GAP SERPL CALCULATED.3IONS-SCNC: 14 MMOL/L (ref 7–16)
AST SERPL-CCNC: 36 U/L (ref 0–39)
BILIRUB SERPL-MCNC: 0.5 MG/DL (ref 0–1.2)
BUN BLDV-MCNC: 14 MG/DL (ref 6–20)
CALCIUM SERPL-MCNC: 9.4 MG/DL (ref 8.6–10.2)
CHLORIDE BLD-SCNC: 104 MMOL/L (ref 98–107)
CHOLESTEROL, TOTAL: 263 MG/DL (ref 0–199)
CO2: 24 MMOL/L (ref 22–29)
CREAT SERPL-MCNC: 1.1 MG/DL (ref 0.7–1.2)
GFR AFRICAN AMERICAN: >60
GFR NON-AFRICAN AMERICAN: >60 ML/MIN/1.73
GLUCOSE BLD-MCNC: 96 MG/DL (ref 74–99)
HCT VFR BLD CALC: 48.6 % (ref 37–54)
HDLC SERPL-MCNC: 41 MG/DL
HEMOGLOBIN: 15.7 G/DL (ref 12.5–16.5)
LDL CHOLESTEROL CALCULATED: 198 MG/DL (ref 0–99)
MCH RBC QN AUTO: 29 PG (ref 26–35)
MCHC RBC AUTO-ENTMCNC: 32.3 % (ref 32–34.5)
MCV RBC AUTO: 89.8 FL (ref 80–99.9)
PDW BLD-RTO: 12.9 FL (ref 11.5–15)
PLATELET # BLD: 115 E9/L (ref 130–450)
PMV BLD AUTO: 13.2 FL (ref 7–12)
POTASSIUM SERPL-SCNC: 4.4 MMOL/L (ref 3.5–5)
PROSTATE SPECIFIC ANTIGEN: 1.11 NG/ML (ref 0–4)
RBC # BLD: 5.41 E12/L (ref 3.8–5.8)
SODIUM BLD-SCNC: 142 MMOL/L (ref 132–146)
TOTAL PROTEIN: 7.4 G/DL (ref 6.4–8.3)
TRIGL SERPL-MCNC: 118 MG/DL (ref 0–149)
TSH SERPL DL<=0.05 MIU/L-ACNC: 1.67 UIU/ML (ref 0.27–4.2)
VLDLC SERPL CALC-MCNC: 24 MG/DL
WBC # BLD: 4.3 E9/L (ref 4.5–11.5)

## 2020-07-22 PROCEDURE — G0103 PSA SCREENING: HCPCS

## 2020-07-22 PROCEDURE — 80053 COMPREHEN METABOLIC PANEL: CPT

## 2020-07-22 PROCEDURE — 36415 COLL VENOUS BLD VENIPUNCTURE: CPT

## 2020-07-22 PROCEDURE — 84443 ASSAY THYROID STIM HORMONE: CPT

## 2020-07-22 PROCEDURE — 85027 COMPLETE CBC AUTOMATED: CPT

## 2020-07-22 PROCEDURE — 80061 LIPID PANEL: CPT

## 2020-07-30 ENCOUNTER — HOSPITAL ENCOUNTER (OUTPATIENT)
Dept: PHYSICAL THERAPY | Age: 55
Setting detail: THERAPIES SERIES
Discharge: HOME | End: 2020-07-30
Payer: COMMERCIAL

## 2020-07-30 NOTE — PROGRESS NOTES
[address] [facility]                Phone: [phone]   Fax: [phone]    Physical Therapy  Out Patient Discharge Summary       Date:  2020    Patient Name:  Lane Jerry    :  1965                Diagnosis:  OA B knees  Referring Physician:  Hiral Price therapist:  MIGNON Tobias        This is to inform you that, as per [facility] Physical Therapy department policy, your patient Lane Jerry is as of todays date being discharged from Physical Therapy secondary to the following reasons:    1. If a Physical Therapy outpatient misses three consecutive scheduled appointments   without any prior notification, he or she will be discharged from physical therapy services. A new prescription will be necessary to resume physical therapy. 2. If a client is absent for 50% of scheduled visits during a one-month period, he or she will be discharged from physical therapy services. A new prescription will be necessary to resume physical therapy. If you have any questions, please feel free to call at [phone]    Thank you,    JILLIAN VELAZQUEZ, PTA      The information contained in this Fax the designated recipients intend message only for the personal and confidential use named above. This information is to be handled as privileged and confidential.  If the reader of this message is not the intended recipient, you are hereby notified that you have received this document in error and that any review, dissemination, distribution or copying of this message is strictly prohibited. If you receive this communication in error, please notify us immediately at the above number and return the original to us by mail.   Thank you  [address] [place], [place] [address] [phone]

## 2020-08-05 ENCOUNTER — TELEPHONE (OUTPATIENT)
Dept: FAMILY MEDICINE CLINIC | Age: 55
End: 2020-08-05

## 2020-08-05 NOTE — LETTER
[address] [place] [address] Rd  [address] [address]  Dept: [phone]  Dept Fax: [address] [facility] [place] [address]           8/5/2020     Dear: Mr. Sudha Rossi cholesterol is still elevated. Total cholesterol should be less than 200, and yours is 263. Your bad cholesterol, the LDL, should be less than 120, and yours is 198. These elevated numbers put you at higher risk of heart disease. I see that you could not tolerate taking Lipitor in the past, so I wanted to discuss with you the other options that are available for treating your cholesterol. Please contact my office at your earliest convenience.     Yours truly,        Lee Bella MD

## 2020-08-05 NOTE — TELEPHONE ENCOUNTER
I attempted to call patient regarding results. Patient has elevated cholesterol but unable to tolerate Lipitor in the past. Will mail patient a letter regarding results.

## 2020-08-12 ENCOUNTER — OFFICE VISIT (OUTPATIENT)
Dept: FAMILY MEDICINE CLINIC | Age: 55
End: 2020-08-12
Payer: COMMERCIAL

## 2020-08-12 VITALS
HEART RATE: 64 BPM | BODY MASS INDEX: 32.44 KG/M2 | SYSTOLIC BLOOD PRESSURE: 118 MMHG | HEIGHT: 69 IN | RESPIRATION RATE: 18 BRPM | WEIGHT: 219 LBS | OXYGEN SATURATION: 98 % | DIASTOLIC BLOOD PRESSURE: 88 MMHG

## 2020-08-12 PROCEDURE — 99214 OFFICE O/P EST MOD 30 MIN: CPT | Performed by: FAMILY MEDICINE

## 2020-08-12 RX ORDER — MULTIVIT,IRON,MINS/FOLIC ACID 3-200-400
1 TABLET ORAL 2 TIMES DAILY
COMMUNITY
Start: 2020-08-12

## 2020-08-12 ASSESSMENT — ENCOUNTER SYMPTOMS
VOMITING: 0
NAUSEA: 0
SHORTNESS OF BREATH: 0
DIARRHEA: 0

## 2020-08-12 NOTE — PROGRESS NOTES
[address] [facility], [address]   [address] [facility]   Yonny Arroyo MD     Patient: Clifford Rapp Birth: 1965  Visit Date: 20    Kevan Melton is a 48y.o. year old male here today for   Chief Complaint   Patient presents with    Hyperlipidemia    Results       HPI  Patient here for lab results. Showed elevated cholesterol. Patient has family history of hyperlipidemia. Patient wants to try diet changes before needing to start medication. Patient devastated due to recent death of his brother. He  last month of suspected overdose, but autopsy results not back yet. States having a lot of problems with his surviving siblings is causing a lot of stress. Review of Systems   Respiratory: Negative for shortness of breath. Cardiovascular: Negative for chest pain, palpitations and leg swelling. Gastrointestinal: Negative for diarrhea, nausea and vomiting. Psychiatric/Behavioral: Positive for dysphoric mood. Past medical, surgical, social and/or family historyreviewed, updated as needed, and are non-contributory (unless otherwise stated). Medications, allergies, and problem list also reviewed and updated as needed in patient's record.      Current Outpatient Medications   Medication Sig Dispense Refill    Multiple Vitamins-Minerals (CENTRUM SPECIALIST HEART) TABS Take 1 tablet by mouth 2 times daily      DULoxetine (CYMBALTA) 60 MG extended release capsule Take 60 mg by mouth daily      DULoxetine (CYMBALTA) 30 MG extended release capsule Take 30 mg by mouth daily      diclofenac (VOLTAREN) 75 MG EC tablet Take 1 tablet by mouth 2 times daily 60 tablet 3    BREO ELLIPTA 200-25 MCG/INH AEPB inhaler       dorzolamide (TRUSOPT) 2 % ophthalmic solution 2 drops 3 times daily      brimonidine (ALPHAGAN) 0.2 % ophthalmic solution INSTILL ONE DROP TWO TIMES EVERY DAY INTO BOTH EYES.  3    doxepin (SINEQUAN) 50 MG capsule 50 mg as needed       latanoprost (XALATAN) 0.005 % ophthalmic solution Place 1 drop into both eyes nightly       pregabalin (LYRICA) 100 MG capsule Take 1 capsule by mouth 2 times daily for 30 days. 60 capsule 2    albuterol sulfate HFA (PROAIR HFA) 108 (90 Base) MCG/ACT inhaler Inhale 2 puffs into the lungs every 4 hours as needed for Wheezing or Shortness of Breath 1 Inhaler 6     No current facility-administered medications for this visit. Wt Readings from Last 3 Encounters:   08/12/20 219 lb (99.3 kg)   07/14/20 217 lb (98.4 kg)   06/26/20 219 lb (99.3 kg)                   Vitals:    08/12/20 1204   BP: 118/88   Pulse: 64   Resp: 18   SpO2: 98%        Physical Exam  Vitals signs reviewed. Constitutional:       General: He is not in acute distress. Appearance: He is well-developed. Neck:      Musculoskeletal: Neck supple. Vascular: No carotid bruit. Cardiovascular:      Rate and Rhythm: Normal rate and regular rhythm. Heart sounds: Normal heart sounds. No murmur. No gallop. Pulmonary:      Effort: Pulmonary effort is normal.      Breath sounds: Normal breath sounds. No wheezing or rales. Abdominal:      General: Bowel sounds are normal. There is no distension. Palpations: Abdomen is soft. Tenderness: There is no abdominal tenderness. Skin:     General: Skin is warm and dry. Neurological:      Mental Status: He is alert and oriented to person, place, and time.        Results for orders placed or performed during the hospital encounter of 07/22/20   Psa screening   Result Value Ref Range    PSA 1.11 0.00 - 4.00 ng/mL   CBC   Result Value Ref Range    WBC 4.3 (L) 4.5 - 11.5 E9/L    RBC 5.41 3.80 - 5.80 E12/L    Hemoglobin 15.7 12.5 - 16.5 g/dL    Hematocrit 48.6 37.0 - 54.0 %    MCV 89.8 80.0 - 99.9 fL    MCH 29.0 26.0 - 35.0 pg    MCHC 32.3 32.0 - 34.5 %    RDW 12.9 11.5 - 15.0 fL    Platelets 059 (L) 161 - 450 E9/L    MPV 13.2 (H) 7.0 - 12.0 fL   Comprehensive Metabolic Panel   Result Value Ref Range    Sodium 142 132 - 146 mmol/L    Potassium 4.4 3.5 - 5.0 mmol/L    Chloride 104 98 - 107 mmol/L    CO2 24 22 - 29 mmol/L    Anion Gap 14 7 - 16 mmol/L    Glucose 96 74 - 99 mg/dL    BUN 14 6 - 20 mg/dL    CREATININE 1.1 0.7 - 1.2 mg/dL    GFR Non-African American >60 >=60 mL/min/1.73    GFR African American >60     Calcium 9.4 8.6 - 10.2 mg/dL    Total Protein 7.4 6.4 - 8.3 g/dL    Alb 4.3 3.5 - 5.2 g/dL    Total Bilirubin 0.5 0.0 - 1.2 mg/dL    Alkaline Phosphatase 56 40 - 129 U/L    ALT 52 (H) 0 - 40 U/L    AST 36 0 - 39 U/L   TSH without Reflex   Result Value Ref Range    TSH 1.670 0.270 - 4.200 uIU/mL   Lipid Panel   Result Value Ref Range    Cholesterol, Total 263 (H) 0 - 199 mg/dL    Triglycerides 118 0 - 149 mg/dL    HDL 41 >40 mg/dL    LDL Calculated 198 (H) 0 - 99 mg/dL    VLDL Cholesterol Calculated 24 mg/dL       ASSESSMENT/PLAN  Hussain Duran was seen today for hyperlipidemia and results. Diagnoses and all orders for this visit:    Mixed hyperlipidemia  -     Multiple Vitamins-Minerals (CENTRUM SPECIALIST HEART) TABS; Take 1 tablet by mouth 2 times daily  -     Lipid Panel; Future    Stressful life event affecting family         -     Discussed at length with patient and gave him opportunity to talk due to how death of his brother is causing him distress         -     Discussion and counseling time 35 minutes          Phone/MyChart follow up if tests abnormal.    Return in about 6 months (around 2/12/2021) for Virtual visit, hyperlipidemia. or sooner if necessary. I have reviewed my findings and recommendations with Hussain Renee.      Leonila Dobbins M.D

## 2020-08-12 NOTE — PATIENT INSTRUCTIONS
Patient Education        Learning About High Cholesterol  What is high cholesterol? High cholesterol means that you have too much cholesterol in your blood. Cholesterol is a type of fat. It's needed for many body functions, such as making new cells. Cholesterol is made by your body. It also comes from food you eat. Having high cholesterol can lead to the buildup of plaque in artery walls. This can increase your risk of heart disease and stroke. When your doctor talks about high cholesterol levels, he or she is talking about your total cholesterol and LDL cholesterol (the \"bad\" cholesterol) levels. Your doctor may also speak about HDL (the \"good\" cholesterol) levels. High HDL is linked with a lower risk for heart disease, heart attack, and stroke. Your cholesterol levels help your doctor find out your risk for having a heart attack or stroke. How can you prevent high cholesterol? A heart-healthy lifestyle can help you prevent high cholesterol. This lifestyle helps lower your risk for a heart attack and stroke. · Eat heart-healthy foods. ? Eat fruits, vegetables, whole grains (like oatmeal), dried beans and peas, nuts and seeds, soy products (like tofu), and fat-free or low-fat dairy products. ? Replace butter, margarine, and hydrogenated or partially hydrogenated oils with olive and canola oils. (Canola oil margarine without trans fat is fine.)  ? Replace red meat with fish, poultry, and soy protein (like tofu). ? Limit processed and packaged foods like chips, crackers, and cookies. · Be active. Exercise can improve your cholesterol level. Get at least 30 minutes of exercise on most days of the week. Walking is a good choice. You also may want to do other activities, such as running, swimming, cycling, or playing tennis or team sports. · Stay at a healthy weight. Lose weight if you need to. · Don't smoke. If you need help quitting, talk to your doctor about stop-smoking programs and medicines.  These can increase your chances of quitting for good. How is high cholesterol treated? The goal of treatment is to reduce your chances of having a heart attack or stroke. The goal is not to lower your cholesterol numbers only. · You may make lifestyle changes, such as eating healthy foods, not smoking, losing weight, and being more active. · You may have to take medicine. Follow-up care is a key part of your treatment and safety. Be sure to make and go to all appointments, and call your doctor if you are having problems. It's also a good idea to know your test results and keep a list of the medicines you take. Where can you learn more? Go to https://MobFoxpeNutonian.[employer]. org and sign in to your [employer] account. Enter E455 in the [employer] box to learn more about \"Learning About High Cholesterol. \"     If you do not have an account, please click on the \"Sign Up Now\" link. Current as of: December 16, 2019               Content Version: 12.5  © 4207-3631 [employer]. Care instructions adapted under license by [facility] ([facility]). If you have questions about a medical condition or this instruction, always ask your healthcare professional. [place] [address] any warranty or liability for your use of this information.

## 2020-08-20 ENCOUNTER — OFFICE VISIT (OUTPATIENT)
Dept: ORTHOPEDIC SURGERY | Age: 55
End: 2020-08-20
Payer: COMMERCIAL

## 2020-08-20 VITALS — HEIGHT: 69 IN | WEIGHT: 219 LBS | TEMPERATURE: 97.4 F | BODY MASS INDEX: 32.44 KG/M2

## 2020-08-20 PROCEDURE — 99213 OFFICE O/P EST LOW 20 MIN: CPT | Performed by: ORTHOPAEDIC SURGERY

## 2020-08-20 NOTE — PROGRESS NOTES
Chief Complaint:   Chief Complaint   Patient presents with    Knee Pain     FU Bilateral knee pain. Pain has improved with PTx and Diclofenac. HPI   28-year-old man here for follow-up of bilateral knee pain with patellofemoral arthritis. Patient states physical therapy was helpful. Also finds Voltaren very helpful currently taking it about once per day. He is also identified some activity modifications and avoids his knee pain.     Patient Active Problem List   Diagnosis    Cervical radiculopathy    Herniated nucleus pulposus, C4-5    Herniated nucleus pulposus, C5-6    Whiplash injury to neck    Cervical radiculitis    Cervical spondylosis    Asthma    Primary osteoarthritis of both knees    Mixed hyperlipidemia       Past Medical History:   Diagnosis Date    Asthma     due to exposure to chemical    Glaucoma     Neck pain        Past Surgical History:   Procedure Laterality Date    ANESTHESIA NERVE BLOCK Bilateral 1/14/2020    BILATERAL CERVICAL MEDIAL BRANCH NERVE BLOCK UNDER FLUOROSCOPIC GUIDANCE AT C2,C3 AND C4 WITHOUT SEDATION (CPT 39295,40468) performed by Oanh Aragon MD at [address] [place] [address]  11/07/2018    LAPAROSCOPIC APPENDECTOMY  5/23/2016    NERVE BLOCK Bilateral 01/14/2020    cervical medial branch nerve block    ROTATOR CUFF REPAIR Bilateral     SHOULDER SURGERY Bilateral 1998 r 2007 left       Current Outpatient Medications   Medication Sig Dispense Refill    Multiple Vitamins-Minerals (CENTRUM SPECIALIST HEART) TABS Take 1 tablet by mouth 2 times daily      DULoxetine (CYMBALTA) 60 MG extended release capsule Take 60 mg by mouth daily      DULoxetine (CYMBALTA) 30 MG extended release capsule Take 30 mg by mouth daily      diclofenac (VOLTAREN) 75 MG EC tablet Take 1 tablet by mouth 2 times daily 60 tablet 3    BREO ELLIPTA 200-25 MCG/INH AEPB inhaler       dorzolamide (TRUSOPT) 2 % ophthalmic solution 2 drops 3 times daily      Blood Pressure Mother     Cancer Father         Lung    High Blood Pressure Father    Kimberlee García Glaucoma Sister     Glaucoma Brother     Other Brother         Sarcoidosis of Lungs    Diabetes Paternal Grandmother     Heart Attack Paternal Grandfather     Drug Abuse Brother          Review of Systems   No fever, chills, or other constitutionalsymptoms. No numbness or other neuro symptoms. [unfilled]   No discomfort objectively walking without a limp. Bilateral knee exam demonstrates well muscled quadriceps with no effusions mild patellofemoral crepitation with full range of motion of both knees without pain. Physical Exam    Patient is alert and oriented. Well-developed well-nourished. Pupils equal and reactive. Scleraeanicteric. Neck supple  Lungs clear. Cardiac rate and rhythm regular. Abdomen soft and nontender. Skin warm and dry. ASSESSMENT/PLAN:    iAde Hung was seen today for knee pain. Diagnoses and all orders for this visit:    Primary osteoarthritis of both knees    Doing well with current program.  Parameters for diclofenac usage and continuing appropriate quad exercises reviewed. He will follow-up when needed. Return if symptoms worsen or fail to improve.        Damari Blevins MD    8/20/2020  9:16 AM

## 2020-11-02 ENCOUNTER — OFFICE VISIT (OUTPATIENT)
Dept: PHYSICAL MEDICINE AND REHAB | Age: 55
End: 2020-11-02
Payer: COMMERCIAL

## 2020-11-02 VITALS
HEART RATE: 70 BPM | HEIGHT: 69 IN | SYSTOLIC BLOOD PRESSURE: 136 MMHG | DIASTOLIC BLOOD PRESSURE: 84 MMHG | BODY MASS INDEX: 33.03 KG/M2 | WEIGHT: 223 LBS

## 2020-11-02 PROCEDURE — 99214 OFFICE O/P EST MOD 30 MIN: CPT | Performed by: PHYSICAL MEDICINE & REHABILITATION

## 2020-11-02 RX ORDER — PREGABALIN 100 MG/1
100 CAPSULE ORAL 2 TIMES DAILY
Qty: 60 CAPSULE | Refills: 2 | Status: SHIPPED
Start: 2020-11-02 | End: 2021-08-30 | Stop reason: SDUPTHER

## 2020-11-03 ENCOUNTER — PREP FOR PROCEDURE (OUTPATIENT)
Dept: PAIN MANAGEMENT | Age: 55
End: 2020-11-03

## 2020-11-04 ENCOUNTER — PREP FOR PROCEDURE (OUTPATIENT)
Dept: PAIN MANAGEMENT | Age: 55
End: 2020-11-04

## 2020-12-01 ENCOUNTER — HOSPITAL ENCOUNTER (OUTPATIENT)
Age: 55
Discharge: HOME | End: 2020-12-03
Payer: COMMERCIAL

## 2020-12-01 PROCEDURE — U0003 INFECTIOUS AGENT DETECTION BY NUCLEIC ACID (DNA OR RNA); SEVERE ACUTE RESPIRATORY SYNDROME CORONAVIRUS 2 (SARS-COV-2) (CORONAVIRUS DISEASE [COVID-19]), AMPLIFIED PROBE TECHNIQUE, MAKING USE OF HIGH THROUGHPUT TECHNOLOGIES AS DESCRIBED BY CMS-2020-01-R: HCPCS

## 2020-12-03 LAB
SARS-COV-2: NOT DETECTED
SOURCE: NORMAL

## 2020-12-07 ENCOUNTER — HOSPITAL ENCOUNTER (OUTPATIENT)
Dept: OPERATING ROOM | Age: 55
Setting detail: OUTPATIENT SURGERY
Discharge: HOME | End: 2020-12-07
Attending: PAIN MEDICINE
Payer: COMMERCIAL

## 2020-12-07 ENCOUNTER — HOSPITAL ENCOUNTER (OUTPATIENT)
Age: 55
Setting detail: OUTPATIENT SURGERY
Discharge: HOME | End: 2020-12-07
Attending: PAIN MEDICINE | Admitting: PAIN MEDICINE
Payer: COMMERCIAL

## 2020-12-07 VITALS
DIASTOLIC BLOOD PRESSURE: 89 MMHG | HEIGHT: 69 IN | WEIGHT: 215 LBS | SYSTOLIC BLOOD PRESSURE: 134 MMHG | OXYGEN SATURATION: 97 % | TEMPERATURE: 98.7 F | BODY MASS INDEX: 31.84 KG/M2 | HEART RATE: 70 BPM | RESPIRATION RATE: 16 BRPM

## 2020-12-07 PROCEDURE — 7100000010 HC PHASE II RECOVERY - FIRST 15 MIN: Performed by: PAIN MEDICINE

## 2020-12-07 PROCEDURE — 7100000011 HC PHASE II RECOVERY - ADDTL 15 MIN: Performed by: PAIN MEDICINE

## 2020-12-07 PROCEDURE — 2500000003 HC RX 250 WO HCPCS: Performed by: PAIN MEDICINE

## 2020-12-07 PROCEDURE — 3209999900 FLUORO FOR SURGICAL PROCEDURES

## 2020-12-07 PROCEDURE — 3600000005 HC SURGERY LEVEL 5 BASE: Performed by: PAIN MEDICINE

## 2020-12-07 PROCEDURE — 6360000002 HC RX W HCPCS: Performed by: PAIN MEDICINE

## 2020-12-07 PROCEDURE — 3600000015 HC SURGERY LEVEL 5 ADDTL 15MIN: Performed by: PAIN MEDICINE

## 2020-12-07 PROCEDURE — 64490 INJ PARAVERT F JNT C/T 1 LEV: CPT | Performed by: PAIN MEDICINE

## 2020-12-07 PROCEDURE — 2709999900 HC NON-CHARGEABLE SUPPLY: Performed by: PAIN MEDICINE

## 2020-12-07 PROCEDURE — 64491 INJ PARAVERT F JNT C/T 2 LEV: CPT | Performed by: PAIN MEDICINE

## 2020-12-07 RX ORDER — LIDOCAINE HYDROCHLORIDE 5 MG/ML
INJECTION, SOLUTION INFILTRATION; INTRAVENOUS PRN
Status: DISCONTINUED | OUTPATIENT
Start: 2020-12-07 | End: 2020-12-07 | Stop reason: ALTCHOICE

## 2020-12-07 ASSESSMENT — PAIN SCALES - GENERAL
PAINLEVEL_OUTOF10: 0
PAINLEVEL_OUTOF10: 0

## 2020-12-07 ASSESSMENT — PAIN - FUNCTIONAL ASSESSMENT: PAIN_FUNCTIONAL_ASSESSMENT: 0-10

## 2020-12-07 NOTE — OP NOTE
2020    Patient: Bryn Ramirez  :  1965  Age:  48 y.o. Sex:  male     PRE-PROCEDURE DIAGNOSIS: Bilateral  Cervical facet syndrome, cervical spondylosis. POST-PROCEDURE DIAGNOSIS: Same. PROCEDURE: # 1  Bilateral Cervical medial branch blocks at  Levels C3, C4 and C5    SURGEON: ANITA Dove M.D. ANESTHESIA: Local    ESTIMATED BLOOD LOSS:  None.  ______________________________________________________________________  BRIEF HISTORY:  Bryn Ramirez comes in today for Bilateral cervical facet medial branch block at levels C3, C4 and C5 . The potential complications of this procedure were discussed with him again today. He has elected to undergo the aforementioned procedure. Trever complete History & Physical examination were reviewed in depth, a copy of which is in the chart. DESCRIPTION OF PROCEDURE:    After confirming written and informed consent, a time-out was performed and Trever name and date of birth, the procedure to be performed as well as the plan for the location of the needle insertion were confirmed. The patient was brought into the procedure room and placed in the lateral recumbent on the fluoroscopy table. Standard monitors were placed and vital signs were observed throughout the procedure. The area of the cervical spine was prepped with chloraprep and draped in the usual sterile manner. Lateral fluoroscopy views were used to identify and lizzie the middle of the centroid of the facets of the targeted levels. The spinal needle was directed until bone was contacted at each level. After negative aspiration was confirmed, a solution of marcaine 0.25% and 40 mg DepoMedrol 1 cc was injected equally at the levels. The needles were removed and the patient body part was cleaned and bandage was placed over the needle insertion. Disposition the patient tolerated the procedure well and there were no complications . Vital signs remained stable throughout the procedure.  The patient

## 2020-12-07 NOTE — H&P
[address]  [address] [facility], [address] [facility] [place]  [phone]    Procedure History & Physical      Jabier Calion     HPI:    Patient  is here for axial neck pain for bilateral C3,4,5 MBB  Labs/imaging studies reviewed   All question and concerns addressed including R/B/A associated with the procedure    Past Medical History:   Diagnosis Date    Asthma     due to exposure to chemical    Glaucoma     Neck pain        Past Surgical History:   Procedure Laterality Date    ANESTHESIA NERVE BLOCK Bilateral 1/14/2020    BILATERAL CERVICAL MEDIAL BRANCH NERVE BLOCK UNDER FLUOROSCOPIC GUIDANCE AT C2,C3 AND C4 WITHOUT SEDATION (CPT 89320,42232) performed by Beverly Russo MD at [address] [facility]  11/07/2018    LAPAROSCOPIC APPENDECTOMY  5/23/2016    NERVE BLOCK Bilateral 01/14/2020    cervical medial branch nerve block    ROTATOR CUFF REPAIR Bilateral     SHOULDER SURGERY Bilateral 1998 r 2007 left       Prior to Admission medications    Medication Sig Start Date End Date Taking?  Authorizing Provider   Multiple Vitamins-Minerals (CENTRUM SPECIALIST HEART) TABS Take 1 tablet by mouth 2 times daily 8/12/20  Yes Gerardo Santo MD   DULoxetine (CYMBALTA) 60 MG extended release capsule Take 60 mg by mouth daily 5/18/20  Yes Historical Provider, MD   DULoxetine (CYMBALTA) 30 MG extended release capsule Take 30 mg by mouth daily 5/18/20  Yes Historical Provider, MD   diclofenac (VOLTAREN) 75 MG EC tablet Take 1 tablet by mouth 2 times daily 6/11/20  Yes MD MARTA Malloy ELLIPTA 200-25 MCG/INH AEPB inhaler  2/27/20  Yes Historical Provider, MD   albuterol sulfate HFA (PROAIR HFA) 108 (90 Base) MCG/ACT inhaler Inhale 2 puffs into the lungs every 4 hours as needed for Wheezing or Shortness of Breath 10/25/19 11/30/20 Yes Yimi Goldsmith MD   dorzolamide (TRUSOPT) 2 % ophthalmic solution 2 drops 3 times daily   Yes Family History   Problem Relation Age of Onset    Asthma Mother     High Blood Pressure Mother     Cancer Father         Lung    High Blood Pressure Father     Glaucoma Sister     Glaucoma Brother     Other Brother         Sarcoidosis of Lungs    Diabetes Paternal Grandmother     Heart Attack Paternal Grandfather     Drug Abuse Brother          REVIEW OF SYSTEMS:    CONSTITUTIONAL:  negative for  fevers, chills, sweats and fatigue    RESPIRATORY:  negative for  dry cough, cough with sputum, dyspnea, wheezing and chest pain    CARDIOVASCULAR:  negative for chest pain, dyspnea, palpitations, syncope    GASTROINTESTINAL:  negative for nausea, vomiting, change in bowel habits, diarrhea, constipation and abdominal pain    MUSCULOSKELETAL: negative for muscle weakness    SKIN: negative for itching or rashes. BEHAVIOR/PSYCH:  negative for poor appetite, increased appetite, decreased sleep and poor concentration    All other systems negative      PHYSICAL EXAM:    VITALS:  /86   Pulse 82   Temp 98.7 °F (37.1 °C) (Skin)   Resp 16   Ht 5' 9\" (1.753 m)   Wt 215 lb (97.5 kg)   SpO2 97%   BMI 31.75 kg/m²     CONSTITUTIONAL:  awake, alert, cooperative, no apparent distress, and appears stated age    EYES: PERRLA, EOMI    LUNGS:  No increased work of breathing, no audible wheezing    CARDIOVASCULAR:  regular rate and rhythm    ABDOMEN:  Soft non tender non distended     EXTREMITIES: no signs of clubbing or cyanosis. MUSCULOSKELETAL: negative for flaccid muscle tone or spastic movements. SKIN: gross examination reveals no signs of rashes, or diaphoresis. NEURO: Cranial nerves II-XII grossly intact. No signs of agitated mood.        Assessment/Plan:    Axial neck pain for bilateral C3,4,5 MBB

## 2021-01-20 ENCOUNTER — OFFICE VISIT (OUTPATIENT)
Dept: PAIN MANAGEMENT | Age: 56
End: 2021-01-20
Payer: COMMERCIAL

## 2021-01-20 VITALS
BODY MASS INDEX: 31.84 KG/M2 | HEIGHT: 69 IN | HEART RATE: 66 BPM | RESPIRATION RATE: 16 BRPM | SYSTOLIC BLOOD PRESSURE: 122 MMHG | OXYGEN SATURATION: 99 % | TEMPERATURE: 97.8 F | DIASTOLIC BLOOD PRESSURE: 80 MMHG | WEIGHT: 215 LBS

## 2021-01-20 DIAGNOSIS — M50.221 HERNIATED NUCLEUS PULPOSUS, C4-5: ICD-10-CM

## 2021-01-20 DIAGNOSIS — M54.12 C6 RADICULOPATHY: ICD-10-CM

## 2021-01-20 DIAGNOSIS — G89.29 CHRONIC SHOULDER PAIN, UNSPECIFIED LATERALITY: ICD-10-CM

## 2021-01-20 DIAGNOSIS — M54.12 CERVICAL RADICULITIS: ICD-10-CM

## 2021-01-20 DIAGNOSIS — S13.9XXS NECK SPRAIN, SEQUELA: ICD-10-CM

## 2021-01-20 DIAGNOSIS — M17.0 PRIMARY OSTEOARTHRITIS OF BOTH KNEES: Primary | ICD-10-CM

## 2021-01-20 DIAGNOSIS — G89.4 CHRONIC PAIN SYNDROME: ICD-10-CM

## 2021-01-20 DIAGNOSIS — M25.519 CHRONIC SHOULDER PAIN, UNSPECIFIED LATERALITY: ICD-10-CM

## 2021-01-20 PROCEDURE — 99213 OFFICE O/P EST LOW 20 MIN: CPT | Performed by: PAIN MEDICINE

## 2021-01-20 NOTE — PROGRESS NOTES
Do you currently have any of the following:    Fever: No  Headache:  No  Cough: No  Shortness of breath: No  Exposed to anyone with these symptoms: No    Patient had Maximcarmella in November. Jesus Muniz presents to the [facility] on 1/20/2021. Ricardo Najera is complaining of pain in.his neck. The pain is intermittent. The pain is described as aching, shooting and sharp. Pain is rated on his best day at a 3, on his worst day at a 7, and on average at a 5 on the VAS scale. He took his last dose of diclofenac and cymbalta and lyrica . Ricardo Najera does not have issues with constipation. Any procedures since your last visit: Yes, with 60 % relief. He is  on NSAIDS and  is  on anticoagulation medications to include none and is managed by NA. Pacemaker or defibrillator: No Physician managing device is NA. Medication Contract and Consent for Opioid Use Documents Filed     Patient Documents       Type of Document Status Date Received Received By Description     Medication Contract Received 9/30/2019  9:25 AM Iraida Common Medication Agreement Lyrica 9-25-19                   /80   Pulse 66   Temp 97.8 °F (36.6 °C) (Infrared)   Resp 16   Ht 5' 9\" (1.753 m)   Wt 215 lb (97.5 kg)   SpO2 99%   BMI 31.75 kg/m²      No LMP for male patient.

## 2021-01-20 NOTE — PROGRESS NOTES
Bilateral 12/07/2020    cervical medial branch facet block     NERVE BLOCK Bilateral 12/7/2020    BILATERAL CERVICAL MEDIAL BRANCH FACET BLOCK C2,C3,C4 WITHOUT SEDATION (CPT 76623) performed by Raya Scott MD at [address] Bilateral    [place] Bilateral 1998 r 2007 left     Prior to Admission medications    Medication Sig Start Date End Date Taking? Authorizing Provider   Multiple Vitamins-Minerals (CENTRUM SPECIALIST HEART) TABS Take 1 tablet by mouth 2 times daily 8/12/20  Yes Carlos Potts MD   DULoxetine (CYMBALTA) 60 MG extended release capsule Take 60 mg by mouth daily 5/18/20  Yes Historical Provider, MD   DULoxetine (CYMBALTA) 30 MG extended release capsule Take 30 mg by mouth daily 5/18/20  Yes Historical Provider, MD   diclofenac (VOLTAREN) 75 MG EC tablet Take 1 tablet by mouth 2 times daily 6/11/20  Yes Verlan Husbands, MD LOZANO ELLIPTA 200-25 MCG/INH AEPB inhaler  2/27/20  Yes Historical Provider, MD   dorzolamide (TRUSOPT) 2 % ophthalmic solution 2 drops 3 times daily   Yes Historical Provider, MD   brimonidine (ALPHAGAN) 0.2 % ophthalmic solution INSTILL ONE DROP TWO TIMES EVERY DAY INTO BOTH EYES. 6/18/19  Yes Historical Provider, MD   doxepin (SINEQUAN) 50 MG capsule 50 mg as needed  9/25/17  Yes Historical Provider, MD   latanoprost (XALATAN) 0.005 % ophthalmic solution Place 1 drop into both eyes nightly    Yes Historical Provider, MD   pregabalin (LYRICA) 100 MG capsule Take 1 capsule by mouth 2 times daily for 30 days.  11/2/20 12/2/20  Keri Louis DO   albuterol sulfate HFA (PROAIR HFA) 108 (90 Base) MCG/ACT inhaler Inhale 2 puffs into the lungs every 4 hours as needed for Wheezing or Shortness of Breath 10/25/19 11/30/20  Jacklyn Reyes MD     Allergies   Allergen Reactions    Lipitor Anaphylaxis    Sulfa Antibiotics Anaphylaxis     Social History     Socioeconomic History    Marital status:      Spouse name: Not on General:       General appearance: awake, alert, oriented   pleasant and well-hydrated. , in mild discomfort and A & O x3  Build:Normal Weight  Function:Rises from a seated position easily.     HEENT:     Head:normocephalic and atraumatic  Sclera: icterus absent,      Lungs:     Breathing:Breathing Pattern: regular, no distress     Abdomen:     Shape:non-distended and normal  Scars:yes  Tenderness:none     Cervical spine:     Inspection:anterior fusion scar  Palpation:mild facet tenderness bilaterally. Range of motion:improved ROM.    Musculoskeletal:     Trigger points in Paraveteral:present bilaterally  Spurling's:negative right, negative left     Extremities:     Tremors:None bilaterally upper and lower  Range of motion:Generally normal shoulders  Intact:Yes  Edema:Normal     Neurological:     Sensory:normal to joint position sense and light touch   Motor:   Right Grip5/5              Left Grip5/5               Right Bicep5/5           Left Bicep5/5              Right Triceps5/5       Left Triceps5/5          Right Deltoid5/5     Left Deltoid5/5                  Reflexes:    Right Brachioradialis reflex2+  Left Brachioradialis reflex2+  Right Biceps reflex2+  Left Biceps reflex2+  Right Triceps reflex2+  Left Triceps reflex2+  Gait:normal     Dermatology:     Skin:no unusual rashes and no skin lesions     Assessment/Plan:  Chronic neck pain with radiation to the Left upper extremity that started after a work related injury in 2016  Patient had seen Johanne Orellana but he didn't recommend surgery  Patient had seen  at [facility] clinic and had underwent C4-5/C5-6 anterior fusion 11/2018  Plan:  Follow up on his neck pain with no acute issues. Patient is repeat bilateral C2,3,4 MBB with excellent relief more than 75% improvement in his pain. Currently he has no pain, will consider RFA when his pain worsens. Currently seeing Larry Francisco and is on Lyrica/Voltaren/Cymbalta/Flexeril.   OARRS report reviewed 05/2020. Patient encouraged to stay active. Treatment plan discussed with the patient including medications and procedure side effects. We discussed with the patient that combining opioids, benzodiazepines, alcohol, illicit drugs or sleep aids increases the risk of respiratory depression including death. We discussed that these medications may cause drowsiness, sedation or dizziness and have counseled the patient not to drive or operate machinery. We have discussed that these medications will be prescribed only by one provider. We have discussed with the patient about age related risk factors and have thoroughly discussed the importance of taking these medications as prescribed. The patient verbalizes understanding. jennifer Goodwin M.D.

## 2021-02-17 ENCOUNTER — VIRTUAL VISIT (OUTPATIENT)
Dept: FAMILY MEDICINE CLINIC | Age: 56
End: 2021-02-17
Payer: COMMERCIAL

## 2021-02-17 DIAGNOSIS — E78.2 MIXED HYPERLIPIDEMIA: Primary | ICD-10-CM

## 2021-02-17 DIAGNOSIS — E66.9 OBESITY (BMI 30.0-34.9): ICD-10-CM

## 2021-02-17 PROCEDURE — 99213 OFFICE O/P EST LOW 20 MIN: CPT | Performed by: FAMILY MEDICINE

## 2021-02-17 ASSESSMENT — PATIENT HEALTH QUESTIONNAIRE - PHQ9
SUM OF ALL RESPONSES TO PHQ9 QUESTIONS 1 & 2: 0
SUM OF ALL RESPONSES TO PHQ QUESTIONS 1-9: 0
1. LITTLE INTEREST OR PLEASURE IN DOING THINGS: 0

## 2021-02-17 ASSESSMENT — ENCOUNTER SYMPTOMS
SHORTNESS OF BREATH: 0
VOMITING: 0
NAUSEA: 0
DIARRHEA: 0

## 2021-02-17 NOTE — PROGRESS NOTES
[address] [facility], [address]   [address] [facility]   Tyson Guerrier MD     Patient: Glenetta Schirmer Birth: 1965  Visit Date: 2/17/21    Bess Mahan is a 55y.o. year old male here today for   Chief Complaint   Patient presents with    Hyperlipidemia       HPI  Patient doing well on current regimen for hyperlipidemia. Patient tolerating Centrum Heart vitamin. Patient has changed diet considerably. Denies chest pain or shortness of breath. Recent lab results reviewed with patient, including CMP, lipid panel, and CBC. Patient having difficulty losing weight. Review of Systems   Eyes: Positive for visual disturbance (comes and goes with certain foods). Respiratory: Negative for shortness of breath. Cardiovascular: Negative for chest pain, palpitations and leg swelling. Gastrointestinal: Negative for diarrhea, nausea and vomiting. Genitourinary: Negative for difficulty urinating, dysuria and frequency. Skin: Negative for rash. Psychiatric/Behavioral: Negative for dysphoric mood. Past medical, surgical, social and/or family historyreviewed, updated as needed, and are non-contributory (unless otherwise stated). Medications, allergies, and problem list also reviewed and updated as needed in patient's record.      Current Outpatient Medications   Medication Sig Dispense Refill    Multiple Vitamins-Minerals (CENTRUM SPECIALIST HEART) TABS Take 1 tablet by mouth 2 times daily      DULoxetine (CYMBALTA) 60 MG extended release capsule Take 60 mg by mouth daily      DULoxetine (CYMBALTA) 30 MG extended release capsule Take 30 mg by mouth daily      diclofenac (VOLTAREN) 75 MG EC tablet Take 1 tablet by mouth 2 times daily 60 tablet 3    BREO ELLIPTA 200-25 MCG/INH AEPB inhaler       dorzolamide (TRUSOPT) 2 % ophthalmic solution 2 drops 3 times daily  brimonidine (ALPHAGAN) 0.2 % ophthalmic solution INSTILL ONE DROP TWO TIMES EVERY DAY INTO BOTH EYES.  3    doxepin (SINEQUAN) 50 MG capsule 50 mg as needed       latanoprost (XALATAN) 0.005 % ophthalmic solution Place 1 drop into both eyes nightly       pregabalin (LYRICA) 100 MG capsule Take 1 capsule by mouth 2 times daily for 30 days. 60 capsule 2    albuterol sulfate HFA (PROAIR HFA) 108 (90 Base) MCG/ACT inhaler Inhale 2 puffs into the lungs every 4 hours as needed for Wheezing or Shortness of Breath 1 Inhaler 6     No current facility-administered medications for this visit. Wt Readings from Last 3 Encounters:   01/20/21 215 lb (97.5 kg)   11/30/20 215 lb (97.5 kg)   11/02/20 223 lb (101.2 kg)                   There were no vitals filed for this visit. Physical Exam  Results for orders placed or performed during the hospital encounter of 12/01/20   COVID-19 Ambulatory    Specimen: Nasopharyngeal Swab   Result Value Ref Range    SARS-CoV-2 Not Detected Not Detected    Source OP swab        ASSESSMENT/PLAN  Ammon Gottron was seen today for hyperlipidemia. Diagnoses and all orders for this visit:    Mixed hyperlipidemia  -     Lipid Panel; Future        -     Centrum Heart Tabs 1 tablet BID for management of condition        -      Continue diet changes    Obesity (BMI 30.0-34.9)        -      BMI was elevated today, and weight loss plan recommended is : conventional weight loss. Return in about 6 months (around 8/17/2021). or sooner if necessary. TeleMedicine Video Visit    This visit was performed as a virtual video visit using a synchronous, two-way, audio-video telehealth technology platform. Patient identification was verified at the start of the visit, including the patient's telephone number and physical location.  I discussed with the patient the nature of our telehealth visits, that: 1. Due to the nature of an audio- video modality, the only components of a physical exam that could be done are the elements supported by direct observation. 2. I would evaluate the patient and recommend diagnostics and treatments based on my assessment. 3. If it was felt that the patient should be evaluated in clinic or an emergency room setting, then they would be directed there. 4. Our sessions are not being recorded and that personal health information is protected. 5. Our team would provide follow up care in person if/when the patient needs it. Patient does agree to proceed with telemedicine consultation. Patient's location: home address in [facility]  Physician  location other address in [facility] other people involved in call N/A. Time spent: Greater than Not billed by time    This visit was completed virtually using Doxy. me          I have reviewed my findings and recommendations with Porsha Bustamante.      Carlos Potts M.D

## 2021-02-21 PROBLEM — E66.811 OBESITY (BMI 30.0-34.9): Status: ACTIVE | Noted: 2021-02-21

## 2021-02-21 PROBLEM — E66.9 OBESITY (BMI 30.0-34.9): Status: ACTIVE | Noted: 2021-02-21

## 2021-03-15 ENCOUNTER — TELEPHONE (OUTPATIENT)
Dept: FAMILY MEDICINE CLINIC | Age: 56
End: 2021-03-15

## 2021-03-15 NOTE — TELEPHONE ENCOUNTER
Patient called he has been taking the centrum  Heart [facility] vitamin OTC. He can not find any more is there something else he can take?     Last seen 2/17/2021  Next appt 8/18/2021

## 2021-03-17 DIAGNOSIS — E78.2 MIXED HYPERLIPIDEMIA: ICD-10-CM

## 2021-03-17 LAB
CHOLESTEROL, TOTAL: 244 MG/DL (ref 0–199)
HDLC SERPL-MCNC: 39 MG/DL
LDL CHOLESTEROL CALCULATED: 186 MG/DL (ref 0–99)
TRIGL SERPL-MCNC: 94 MG/DL (ref 0–149)
VLDLC SERPL CALC-MCNC: 19 MG/DL

## 2021-04-21 RX ORDER — SIMVASTATIN 20 MG
20 TABLET ORAL NIGHTLY
Qty: 30 TABLET | Refills: 5 | Status: SHIPPED
Start: 2021-04-21 | End: 2021-12-17

## 2021-07-08 ENCOUNTER — TELEPHONE (OUTPATIENT)
Dept: PAIN MANAGEMENT | Age: 56
End: 2021-07-08

## 2021-07-08 NOTE — TELEPHONE ENCOUNTER
7-8-21-received a call from [facility], he had a knee injection before and it helped his pain and wants he wants another with Dr Iraida Kumar. Scheduled with Dr Iraida Kumar on 7-28-21.     Jaime Holden RN  Pain Management

## 2021-07-12 ENCOUNTER — APPOINTMENT (OUTPATIENT)
Dept: ULTRASOUND IMAGING | Age: 56
End: 2021-07-12
Payer: COMMERCIAL

## 2021-07-12 ENCOUNTER — HOSPITAL ENCOUNTER (EMERGENCY)
Age: 56
Discharge: HOME | End: 2021-07-12
Payer: COMMERCIAL

## 2021-07-12 ENCOUNTER — APPOINTMENT (OUTPATIENT)
Dept: GENERAL RADIOLOGY | Age: 56
End: 2021-07-12
Payer: COMMERCIAL

## 2021-07-12 VITALS
TEMPERATURE: 96.8 F | HEART RATE: 109 BPM | SYSTOLIC BLOOD PRESSURE: 108 MMHG | OXYGEN SATURATION: 97 % | RESPIRATION RATE: 22 BRPM | WEIGHT: 220 LBS | DIASTOLIC BLOOD PRESSURE: 77 MMHG | BODY MASS INDEX: 32.58 KG/M2 | HEIGHT: 69 IN

## 2021-07-12 DIAGNOSIS — M17.10 ARTHROSIS OF PATELLOFEMORAL JOINT: ICD-10-CM

## 2021-07-12 DIAGNOSIS — M25.562 ACUTE PAIN OF LEFT KNEE: Primary | ICD-10-CM

## 2021-07-12 DIAGNOSIS — M10.9 GOUTY ARTHRITIS: ICD-10-CM

## 2021-07-12 LAB
ALBUMIN SERPL-MCNC: 4.1 G/DL (ref 3.5–5.2)
ALP BLD-CCNC: 48 U/L (ref 40–129)
ALT SERPL-CCNC: 62 U/L (ref 0–40)
ANION GAP SERPL CALCULATED.3IONS-SCNC: 12 MMOL/L (ref 7–16)
AST SERPL-CCNC: 55 U/L (ref 0–39)
BASOPHILS ABSOLUTE: 0.03 E9/L (ref 0–0.2)
BASOPHILS RELATIVE PERCENT: 0.4 % (ref 0–2)
BILIRUB SERPL-MCNC: 0.4 MG/DL (ref 0–1.2)
BUN BLDV-MCNC: 19 MG/DL (ref 6–20)
CALCIUM SERPL-MCNC: 8.9 MG/DL (ref 8.6–10.2)
CHLORIDE BLD-SCNC: 103 MMOL/L (ref 98–107)
CO2: 24 MMOL/L (ref 22–29)
CREAT SERPL-MCNC: 1.3 MG/DL (ref 0.7–1.2)
EOSINOPHILS ABSOLUTE: 0.22 E9/L (ref 0.05–0.5)
EOSINOPHILS RELATIVE PERCENT: 2.6 % (ref 0–6)
GFR AFRICAN AMERICAN: >60
GFR NON-AFRICAN AMERICAN: >60 ML/MIN/1.73
GLUCOSE BLD-MCNC: 158 MG/DL (ref 74–99)
HCT VFR BLD CALC: 41.2 % (ref 37–54)
HEMOGLOBIN: 13.9 G/DL (ref 12.5–16.5)
IMMATURE GRANULOCYTES #: 0.02 E9/L
IMMATURE GRANULOCYTES %: 0.2 % (ref 0–5)
LYMPHOCYTES ABSOLUTE: 2.72 E9/L (ref 1.5–4)
LYMPHOCYTES RELATIVE PERCENT: 32.3 % (ref 20–42)
MCH RBC QN AUTO: 29.1 PG (ref 26–35)
MCHC RBC AUTO-ENTMCNC: 33.7 % (ref 32–34.5)
MCV RBC AUTO: 86.2 FL (ref 80–99.9)
MONOCYTES ABSOLUTE: 0.59 E9/L (ref 0.1–0.95)
MONOCYTES RELATIVE PERCENT: 7 % (ref 2–12)
NEUTROPHILS ABSOLUTE: 4.83 E9/L (ref 1.8–7.3)
NEUTROPHILS RELATIVE PERCENT: 57.5 % (ref 43–80)
PDW BLD-RTO: 12.4 FL (ref 11.5–15)
PLATELET # BLD: 172 E9/L (ref 130–450)
PMV BLD AUTO: 12.5 FL (ref 7–12)
POTASSIUM SERPL-SCNC: 3.9 MMOL/L (ref 3.5–5)
RBC # BLD: 4.78 E12/L (ref 3.8–5.8)
SODIUM BLD-SCNC: 139 MMOL/L (ref 132–146)
TOTAL PROTEIN: 7.2 G/DL (ref 6.4–8.3)
URIC ACID, SERUM: 6.7 MG/DL (ref 3.4–7)
WBC # BLD: 8.4 E9/L (ref 4.5–11.5)

## 2021-07-12 PROCEDURE — 80053 COMPREHEN METABOLIC PANEL: CPT

## 2021-07-12 PROCEDURE — 93971 EXTREMITY STUDY: CPT

## 2021-07-12 PROCEDURE — 96372 THER/PROPH/DIAG INJ SC/IM: CPT

## 2021-07-12 PROCEDURE — 73564 X-RAY EXAM KNEE 4 OR MORE: CPT

## 2021-07-12 PROCEDURE — 85025 COMPLETE CBC W/AUTO DIFF WBC: CPT

## 2021-07-12 PROCEDURE — 84550 ASSAY OF BLOOD/URIC ACID: CPT

## 2021-07-12 PROCEDURE — 6370000000 HC RX 637 (ALT 250 FOR IP): Performed by: NURSE PRACTITIONER

## 2021-07-12 PROCEDURE — 6360000002 HC RX W HCPCS: Performed by: NURSE PRACTITIONER

## 2021-07-12 PROCEDURE — 99284 EMERGENCY DEPT VISIT MOD MDM: CPT

## 2021-07-12 RX ORDER — OXYCODONE HYDROCHLORIDE AND ACETAMINOPHEN 5; 325 MG/1; MG/1
1 TABLET ORAL ONCE
Status: COMPLETED | OUTPATIENT
Start: 2021-07-12 | End: 2021-07-12

## 2021-07-12 RX ORDER — KETOROLAC TROMETHAMINE 30 MG/ML
30 INJECTION, SOLUTION INTRAMUSCULAR; INTRAVENOUS ONCE
Status: COMPLETED | OUTPATIENT
Start: 2021-07-12 | End: 2021-07-12

## 2021-07-12 RX ORDER — DEXAMETHASONE SODIUM PHOSPHATE 10 MG/ML
10 INJECTION INTRAMUSCULAR; INTRAVENOUS ONCE
Status: COMPLETED | OUTPATIENT
Start: 2021-07-12 | End: 2021-07-12

## 2021-07-12 RX ORDER — COLCHICINE 0.6 MG/1
0.6 TABLET ORAL 2 TIMES DAILY
Qty: 20 TABLET | Refills: 0 | Status: SHIPPED | OUTPATIENT
Start: 2021-07-12

## 2021-07-12 RX ORDER — PREDNISONE 10 MG/1
TABLET ORAL
Qty: 20 TABLET | Refills: 0 | Status: SHIPPED | OUTPATIENT
Start: 2021-07-12 | End: 2021-07-22

## 2021-07-12 RX ORDER — HYDROCODONE BITARTRATE AND ACETAMINOPHEN 5; 325 MG/1; MG/1
1 TABLET ORAL EVERY 4 HOURS PRN
Qty: 10 TABLET | Refills: 0 | Status: SHIPPED | OUTPATIENT
Start: 2021-07-12 | End: 2021-07-15

## 2021-07-12 RX ADMIN — DEXAMETHASONE SODIUM PHOSPHATE 10 MG: 10 INJECTION INTRAMUSCULAR; INTRAVENOUS at 21:25

## 2021-07-12 RX ADMIN — KETOROLAC TROMETHAMINE 30 MG: 30 INJECTION, SOLUTION INTRAMUSCULAR; INTRAVENOUS at 21:37

## 2021-07-12 RX ADMIN — OXYCODONE HYDROCHLORIDE AND ACETAMINOPHEN 1 TABLET: 5; 325 TABLET ORAL at 21:25

## 2021-07-12 ASSESSMENT — PAIN SCALES - GENERAL
PAINLEVEL_OUTOF10: 10
PAINLEVEL_OUTOF10: 10

## 2021-07-13 ENCOUNTER — TELEPHONE (OUTPATIENT)
Dept: ORTHOPEDIC SURGERY | Age: 56
End: 2021-07-13

## 2021-07-13 ENCOUNTER — TELEPHONE (OUTPATIENT)
Dept: ADMINISTRATIVE | Age: 56
End: 2021-07-13

## 2021-07-13 RX ORDER — DICLOFENAC SODIUM 75 MG/1
75 TABLET, DELAYED RELEASE ORAL 2 TIMES DAILY
Qty: 60 TABLET | Refills: 0 | Status: SHIPPED
Start: 2021-07-13 | End: 2021-07-28

## 2021-07-13 NOTE — TELEPHONE ENCOUNTER
30 day diclofenac rx sent. PCP needs to checkkidney and liver labs. Cannot refill more than this w/o appt. Unless PCP will Rx.

## 2021-07-13 NOTE — TELEPHONE ENCOUNTER
Patient called to request refill of Diclofenac given to him back in august 2020. Was seen in ER yesterday for acute left knee pain. X-ray, Ultrasound, Uric Acid level done. Patient given Cabrera Mend, and Colchicine. Patient is taking the Norco and Prednisone. Was told pain was not related to gout, so he did not take  Colchicine. Dixie Shavermarlo states he got better relief of pain with Diclofenac Rx'd by Julisa Velarde. Informed patient I would send message to [facility]. Has not had OV here since 8/20/2020. He has ER FU with PCP 7/20/2021.

## 2021-07-13 NOTE — ED PROVIDER NOTES
Independent   HPI:  7/12/21, Time: 9:14 PM EDT         Quinton Rodarte is a 55 y.o. male presenting to the ED for left knee pain. Patient presents emergency department with complaints of pain to his left knee. States that he actually had some mild left knee pain yesterday states that he was outside a lot working doing some yard work states that it felt better and then today went back outside was doing more yard work and the pain returned. States that he attempted his TENS unit with fair effect. He denies any turning twisting trauma that he can recall. Patient is moderately uncomfortable on arrival here. He does not have any gross leg swelling. States that he tried Motrin and Tylenol but is not getting any relief. He denies any fevers with this there is no warmth or erythema noted either. Denies any history of gout. He also denies any recent long travel and reports otherwise in normal state of health. Patient also denies any chest pain, shortness breath, abdominal pain and no noted nausea, vomiting or diarrhea. Pain moderate in severity, persistent describing as sharp shooting aching. No pain noted to the calf. No leg swelling noted. No deformity. Review of Systems:   A complete review of systems was performed and pertinent positives and negatives are stated within HPI, all other systems reviewed and are negative.          --------------------------------------------- PAST HISTORY ---------------------------------------------  Past Medical History:  has a past medical history of Asthma, Glaucoma, and Neck pain. Past Surgical History:  has a past surgical history that includes shoulder surgery (Bilateral, 1998 r 2007 left); laparoscopic appendectomy (5/23/2016); Appendectomy; Rotator cuff repair (Bilateral); cervical fusion (11/07/2018); Nerve Block (Bilateral, 01/14/2020); Anesthesia Nerve Block (Bilateral, 1/14/2020);  Nerve Block (Bilateral, 12/07/2020); and Nerve Block (Bilateral, 12/7/2020). Social History:  reports that he has never smoked. He has never used smokeless tobacco. He reports current alcohol use. He reports that he does not use drugs. Family History: family history includes Asthma in his mother; Cancer in his father; Diabetes in his paternal grandmother; Drug Abuse in his brother; Glaucoma in his brother and sister; Heart Attack in his paternal grandfather; High Blood Pressure in his father and mother; Other in his brother. The patients home medications have been reviewed.     Allergies: Lipitor and Sulfa antibiotics    -------------------------------------------------- RESULTS -------------------------------------------------  All laboratory and radiology results have been personally reviewed by myself   LABS:  Results for orders placed or performed during the hospital encounter of 07/12/21   CBC Auto Differential   Result Value Ref Range    WBC 8.4 4.5 - 11.5 E9/L    RBC 4.78 3.80 - 5.80 E12/L    Hemoglobin 13.9 12.5 - 16.5 g/dL    Hematocrit 41.2 37.0 - 54.0 %    MCV 86.2 80.0 - 99.9 fL    MCH 29.1 26.0 - 35.0 pg    MCHC 33.7 32.0 - 34.5 %    RDW 12.4 11.5 - 15.0 fL    Platelets 420 991 - 411 E9/L    MPV 12.5 (H) 7.0 - 12.0 fL    Neutrophils % 57.5 43.0 - 80.0 %    Immature Granulocytes % 0.2 0.0 - 5.0 %    Lymphocytes % 32.3 20.0 - 42.0 %    Monocytes % 7.0 2.0 - 12.0 %    Eosinophils % 2.6 0.0 - 6.0 %    Basophils % 0.4 0.0 - 2.0 %    Neutrophils Absolute 4.83 1.80 - 7.30 E9/L    Immature Granulocytes # 0.02 E9/L    Lymphocytes Absolute 2.72 1.50 - 4.00 E9/L    Monocytes Absolute 0.59 0.10 - 0.95 E9/L    Eosinophils Absolute 0.22 0.05 - 0.50 E9/L    Basophils Absolute 0.03 0.00 - 0.20 E9/L   Comprehensive Metabolic Panel   Result Value Ref Range    Sodium 139 132 - 146 mmol/L    Potassium 3.9 3.5 - 5.0 mmol/L    Chloride 103 98 - 107 mmol/L    CO2 24 22 - 29 mmol/L    Anion Gap 12 7 - 16 mmol/L    Glucose 158 (H) 74 - 99 mg/dL    BUN 19 6 - 20 mg/dL    CREATININE 1.3 (H) 0.7 - 1.2 mg/dL    GFR Non-African American >60 >=60 mL/min/1.73    GFR African American >60     Calcium 8.9 8.6 - 10.2 mg/dL    Total Protein 7.2 6.4 - 8.3 g/dL    Albumin 4.1 3.5 - 5.2 g/dL    Total Bilirubin 0.4 0.0 - 1.2 mg/dL    Alkaline Phosphatase 48 40 - 129 U/L    ALT 62 (H) 0 - 40 U/L    AST 55 (H) 0 - 39 U/L   Uric acid   Result Value Ref Range    Uric Acid, Serum 6.7 3.4 - 7.0 mg/dL       RADIOLOGY:  Interpreted by Radiologist.  XR KNEE LEFT (MIN 4 VIEWS)   Final Result   Mild-to-moderate degenerative findings, most notably involving the   patellofemoral joint. No acute abnormality identified. US DUP LOWER EXTREMITY LEFT ADRIEL   Final Result   No evidence of DVT in the left lower extremity.             ------------------------- NURSING NOTES AND VITALS REVIEWED ---------------------------   The nursing notes within the ED encounter and vital signs as below have been reviewed. /77   Pulse 109   Temp 96.8 °F (36 °C) (Tympanic)   Resp 22   Ht 5' 9\" (1.753 m)   Wt 220 lb (99.8 kg)   SpO2 97%   BMI 32.49 kg/m²   Oxygen Saturation Interpretation: Normal      ---------------------------------------------------PHYSICAL EXAM--------------------------------------      Constitutional/General: Alert and oriented x3, moderately uncomfortable  Head: Normocephalic and atraumatic  Eyes: PERRL, EOMI  Mouth: Oropharynx clear, handling secretions, no trismus  Neck: Supple, full ROM,   Pulmonary: Lungs clear to auscultation bilaterally, no wheezes, rales, or rhonchi. Not in respiratory distress  Cardiovascular:  Regular rate and rhythm, no murmurs, gallops, or rubs. 2+ distal pulses  Abdomen: Soft, non tender, non distended,   Extremities: Moves all extremities x 4. Warm and well perfused, compartments soft, full sensation intact. No point tenderness to the left calf. 2+ dorsal pedal pulse. Skin warm and dry. Patient has port point tenderness to the entire patellar region.   There is no are answered at this time and they are agreeable with the plan.      --------------------------------- IMPRESSION AND DISPOSITION ---------------------------------    IMPRESSION  1. Acute pain of left knee    2. Arthrosis of patellofemoral joint    3. Gouty arthritis        DISPOSITION  Disposition: Discharge to home  Patient condition is good      NOTE: This report was transcribed using voice recognition software.  Every effort was made to ensure accuracy; however, inadvertent computerized transcription errors may be present     JUMANA Tom CNP  07/14/21 0206    ATTENDING PROVIDER ATTESTATION:     Supervising Physician, on-site, available for consultation, non-participatory in the evaluation or care of this patient         Briscoe Olszewski, MD  07/14/21 2109

## 2021-07-13 NOTE — TELEPHONE ENCOUNTER
Pt called needing to schedule an ED f/u from  for Acute L knee pain. Told to have a f/u as soon as possible. No available times to schedule Pt for the rest of this month.  Please call Pt to advise

## 2021-07-14 NOTE — TELEPHONE ENCOUNTER
Informed patient RJB Rx'd 30 day supply of Voltaren.   Patient has appt with Steve Bautista on 7/29/2021 and PCP on 7/20/2021

## 2021-07-21 ENCOUNTER — OFFICE VISIT (OUTPATIENT)
Dept: FAMILY MEDICINE CLINIC | Age: 56
End: 2021-07-21

## 2021-07-21 VITALS
HEIGHT: 69 IN | WEIGHT: 219 LBS | OXYGEN SATURATION: 98 % | SYSTOLIC BLOOD PRESSURE: 132 MMHG | RESPIRATION RATE: 20 BRPM | HEART RATE: 83 BPM | BODY MASS INDEX: 32.44 KG/M2 | DIASTOLIC BLOOD PRESSURE: 84 MMHG

## 2021-07-21 DIAGNOSIS — M89.8X1 PAIN OF RIGHT SCAPULA: Primary | ICD-10-CM

## 2021-07-21 PROCEDURE — 99214 OFFICE O/P EST MOD 30 MIN: CPT | Performed by: FAMILY MEDICINE

## 2021-07-21 PROCEDURE — 96372 THER/PROPH/DIAG INJ SC/IM: CPT | Performed by: FAMILY MEDICINE

## 2021-07-21 RX ORDER — KETOROLAC TROMETHAMINE 30 MG/ML
60 INJECTION, SOLUTION INTRAMUSCULAR; INTRAVENOUS ONCE
Status: DISCONTINUED | OUTPATIENT
Start: 2021-07-21 | End: 2021-07-21

## 2021-07-21 RX ORDER — TIZANIDINE 4 MG/1
4 TABLET ORAL 2 TIMES DAILY PRN
Qty: 60 TABLET | Refills: 2 | Status: SHIPPED
Start: 2021-07-21 | End: 2022-05-31

## 2021-07-21 RX ORDER — KETOROLAC TROMETHAMINE 30 MG/ML
30 INJECTION, SOLUTION INTRAMUSCULAR; INTRAVENOUS ONCE
Status: COMPLETED | OUTPATIENT
Start: 2021-07-21 | End: 2021-07-21

## 2021-07-21 RX ADMIN — KETOROLAC TROMETHAMINE 60 MG: 30 INJECTION, SOLUTION INTRAMUSCULAR; INTRAVENOUS at 16:38

## 2021-07-21 SDOH — ECONOMIC STABILITY: FOOD INSECURITY: WITHIN THE PAST 12 MONTHS, YOU WORRIED THAT YOUR FOOD WOULD RUN OUT BEFORE YOU GOT MONEY TO BUY MORE.: NEVER TRUE

## 2021-07-21 SDOH — ECONOMIC STABILITY: FOOD INSECURITY: WITHIN THE PAST 12 MONTHS, THE FOOD YOU BOUGHT JUST DIDN'T LAST AND YOU DIDN'T HAVE MONEY TO GET MORE.: NEVER TRUE

## 2021-07-21 ASSESSMENT — ENCOUNTER SYMPTOMS
NAUSEA: 0
COUGH: 0
DIARRHEA: 0
VOMITING: 0
SHORTNESS OF BREATH: 0

## 2021-07-21 ASSESSMENT — LIFESTYLE VARIABLES: HOW OFTEN DO YOU HAVE A DRINK CONTAINING ALCOHOL: NEVER

## 2021-07-21 NOTE — PATIENT INSTRUCTIONS
Patient Education        Healthy Upper Back: Exercises  Introduction  Here are some examples of exercises for your upper back. Start each exercise slowly. Ease off the exercise if you start to have pain. Your doctor or physical therapist will tell you when you can start these exercises and which ones will work best for you. How to do the exercises  Lower neck and upper back stretch   1. Stretch your arms out in front of your body. Clasp one hand on top of your other hand. 2. Gently reach out so that you feel your shoulder blades stretching away from each other. 3. Gently bend your head forward. 4. Hold for 15 to 30 seconds. 5. Repeat 2 to 4 times. Midback stretch   If you have knee pain, do not do this exercise. 1. Kneel on the floor, and sit back on your ankles. 2. Lean forward, place your hands on the floor, and stretch your arms out in front of you. Rest your head between your arms. 3. Gently push your chest toward the floor, reaching as far in front of you as possible. 4. Hold for 15 to 30 seconds. 5. Repeat 2 to 4 times. Shoulder rolls   1. Sit comfortably with your feet shoulder-width apart. You can also do this exercise while standing. 2. Roll your shoulders up, then back, and then down in a smooth, circular motion. 3. Repeat 2 to 4 times. Wall push-up   1. Stand against a wall with your feet about 12 to 24 inches back from the wall. If you feel any pain when you do this exercise, stand closer to the wall. 2. Place your hands on the wall slightly wider apart than your shoulders, and lean forward. 3. Gently lean your body toward the wall. Then push back to your starting position. Keep the motion smooth and controlled. 4. Repeat 8 to 12 times. Resisted shoulder blade squeeze   For this exercise, you will need elastic exercise material, such as surgical tubing or Thera-Band. 1. Sit or stand, holding the band in both hands in front of you.  Keep your elbows close to your sides, bent

## 2021-07-21 NOTE — PROGRESS NOTES
[address] [facility], [address]   [address] [facility]   Marija Hurtado MD     Patient: Tariq Person Birth: 1965  Visit Date: 7/21/21    Jackie Aparicio is a 55y.o. year old male here today for   Chief Complaint   Patient presents with    Shoulder Pain     in back shoulder pain right x 1 week    Knee Pain     left, feeling better    ED Follow-up       HPI  Shoulder Pain   This is a new problem. The current episode started in the past 7 days. There has been no history of extremity trauma (Starting after lifting luggage on vacation). The problem occurs constantly. The problem has been waxing and waning. The quality of the pain is described as aching and burning (Pulling sensation). Pain scale: 6-10/10. The pain is severe. Pertinent negatives include no fever, numbness or tingling. Associated symptoms comments: +palpable knot. The symptoms are aggravated by lying down. He has tried NSAIDS (Taking otc Advil) for the symptoms. The treatment provided no relief. Knee Pain   The pain is present in the left leg. The pain has been improving since onset. Pertinent negatives include no numbness or tingling. Recent lab results reviewed with patient, including CMP, CBC, and lipid panel    which are remarkable for hyperlipidemia and mildly elevated liver enzymes. Review of Systems   Constitutional: Negative for chills and fever. Eyes: Negative for visual disturbance. Respiratory: Negative for cough and shortness of breath. Cardiovascular: Positive for chest pain (radiates from upper back). Negative for palpitations and leg swelling. Gastrointestinal: Negative for diarrhea, nausea and vomiting. Skin: Negative for rash. Neurological: Negative for tingling and numbness. Past medical, surgical, social and/or family historyreviewed, updated as needed, and are non-contributory (unless otherwise stated).   Medications, allergies, and problem list also reviewed and updated as needed in patient's record. Current Outpatient Medications   Medication Sig Dispense Refill    diclofenac (VOLTAREN) 50 MG EC tablet Take 1 tablet by mouth 3 times daily as needed for Pain 90 tablet 1    tiZANidine (ZANAFLEX) 4 MG tablet Take 1 tablet by mouth 2 times daily as needed (muscle spasms) 60 tablet 2    diclofenac (VOLTAREN) 75 MG EC tablet Take 1 tablet by mouth 2 times daily 60 tablet 0    simvastatin (ZOCOR) 20 MG tablet Take 1 tablet by mouth nightly 30 tablet 5    Multiple Vitamins-Minerals (CENTRUM SPECIALIST HEART) TABS Take 1 tablet by mouth 2 times daily      diclofenac (VOLTAREN) 75 MG EC tablet Take 1 tablet by mouth 2 times daily 60 tablet 3    BREO ELLIPTA 200-25 MCG/INH AEPB inhaler       dorzolamide (TRUSOPT) 2 % ophthalmic solution 2 drops 3 times daily      brimonidine (ALPHAGAN) 0.2 % ophthalmic solution INSTILL ONE DROP TWO TIMES EVERY DAY INTO BOTH EYES.  3    doxepin (SINEQUAN) 50 MG capsule 50 mg as needed       latanoprost (XALATAN) 0.005 % ophthalmic solution Place 1 drop into both eyes nightly       colchicine (COLCRYS) 0.6 MG tablet Take 1 tablet by mouth 2 times daily (Patient not taking: Reported on 7/21/2021) 20 tablet 0    pregabalin (LYRICA) 100 MG capsule Take 1 capsule by mouth 2 times daily for 30 days. 60 capsule 2    DULoxetine (CYMBALTA) 60 MG extended release capsule Take 60 mg by mouth daily (Patient not taking: Reported on 7/21/2021)      DULoxetine (CYMBALTA) 30 MG extended release capsule Take 30 mg by mouth daily (Patient not taking: Reported on 7/21/2021)      albuterol sulfate HFA (PROAIR HFA) 108 (90 Base) MCG/ACT inhaler Inhale 2 puffs into the lungs every 4 hours as needed for Wheezing or Shortness of Breath 1 Inhaler 6     No current facility-administered medications for this visit.        Wt Readings from Last 3 Encounters:   07/21/21 219 lb (99.3 kg)   07/12/21 220 lb (99.8 kg)   01/20/21 215 lb

## 2021-07-23 ENCOUNTER — TELEPHONE (OUTPATIENT)
Dept: FAMILY MEDICINE CLINIC | Age: 56
End: 2021-07-23

## 2021-07-23 NOTE — TELEPHONE ENCOUNTER
Pt called stating  He can not move his arm and neck had xray's  Needs something done he said in real bad pain I explained if gets were he can not handle pain go to ER   will return call  After Monday he said okay

## 2021-07-27 ENCOUNTER — TELEPHONE (OUTPATIENT)
Dept: FAMILY MEDICINE CLINIC | Age: 56
End: 2021-07-27

## 2021-07-28 ENCOUNTER — OFFICE VISIT (OUTPATIENT)
Dept: PAIN MANAGEMENT | Age: 56
End: 2021-07-28
Payer: COMMERCIAL

## 2021-07-28 VITALS
OXYGEN SATURATION: 98 % | SYSTOLIC BLOOD PRESSURE: 124 MMHG | BODY MASS INDEX: 32.58 KG/M2 | HEART RATE: 64 BPM | TEMPERATURE: 98 F | HEIGHT: 69 IN | RESPIRATION RATE: 20 BRPM | WEIGHT: 220 LBS | DIASTOLIC BLOOD PRESSURE: 68 MMHG

## 2021-07-28 DIAGNOSIS — G89.29 CHRONIC SHOULDER PAIN, UNSPECIFIED LATERALITY: ICD-10-CM

## 2021-07-28 DIAGNOSIS — M25.519 CHRONIC SHOULDER PAIN, UNSPECIFIED LATERALITY: ICD-10-CM

## 2021-07-28 DIAGNOSIS — M50.221 HERNIATED NUCLEUS PULPOSUS, C4-5: ICD-10-CM

## 2021-07-28 DIAGNOSIS — G89.4 CHRONIC PAIN SYNDROME: ICD-10-CM

## 2021-07-28 DIAGNOSIS — M54.12 C6 RADICULOPATHY: ICD-10-CM

## 2021-07-28 DIAGNOSIS — M17.0 PRIMARY OSTEOARTHRITIS OF BOTH KNEES: Primary | ICD-10-CM

## 2021-07-28 DIAGNOSIS — M54.12 CERVICAL RADICULITIS: ICD-10-CM

## 2021-07-28 PROCEDURE — 99213 OFFICE O/P EST LOW 20 MIN: CPT | Performed by: PAIN MEDICINE

## 2021-07-28 PROCEDURE — 6360000002 HC RX W HCPCS

## 2021-07-28 PROCEDURE — 99214 OFFICE O/P EST MOD 30 MIN: CPT | Performed by: PAIN MEDICINE

## 2021-07-28 PROCEDURE — 20610 DRAIN/INJ JOINT/BURSA W/O US: CPT | Performed by: PAIN MEDICINE

## 2021-07-28 RX ORDER — BUPIVACAINE HYDROCHLORIDE 2.5 MG/ML
3 INJECTION, SOLUTION INFILTRATION; PERINEURAL ONCE
Status: COMPLETED | OUTPATIENT
Start: 2021-07-28 | End: 2021-07-28

## 2021-07-28 RX ORDER — METHYLPREDNISOLONE ACETATE 40 MG/ML
80 INJECTION, SUSPENSION INTRA-ARTICULAR; INTRALESIONAL; INTRAMUSCULAR; SOFT TISSUE ONCE
Status: COMPLETED | OUTPATIENT
Start: 2021-07-28 | End: 2021-07-28

## 2021-07-28 RX ADMIN — BUPIVACAINE HYDROCHLORIDE 25 MG: 2.5 INJECTION, SOLUTION INFILTRATION; PERINEURAL at 16:49

## 2021-07-28 RX ADMIN — METHYLPREDNISOLONE ACETATE 40 MG: 40 INJECTION, SUSPENSION INTRA-ARTICULAR; INTRALESIONAL; INTRAMUSCULAR; SOFT TISSUE at 16:49

## 2021-07-28 NOTE — PROGRESS NOTES
Do you currently have any of the following:    Fever: No  Headache:  No  Cough: No  Shortness of breath: No  Exposed to anyone with these symptoms: No                                                                                                                Jabier Romano presents to the [facility] on 7/28/2021. Orlando Giraldo is complaining of pain his upper back between shoulder blades, numbness down right arm and left knee. The pain is constant. The pain is described as stabbing, sharp and burning. Pain is rated on his best day at a 9, on his worst day at a 10, and on average at a 9 on the VAS scale. He took his last dose of zanaflex today,diclofenac today. Orlando Giraldo does not have issues with constipation. Any procedures since your last visit: No,     He is not on NSAIDS and  is not on anticoagulation medications to include none     Pacemaker or defibrillator: No     Medication Contract and Consent for Opioid Use Documents Filed     Patient Documents       Type of Document Status Date Received Received By Description     Medication Contract Received 9/30/2019  9:25 AM Trevon Breaker Medication Agreement Lyrica 9-25-19                   /68   Pulse 64   Temp 98 °F (36.7 °C)   Resp 20   Ht 5' 9\" (1.753 m)   Wt 220 lb (99.8 kg)   SpO2 98%   BMI 32.49 kg/m²      No LMP for male patient.

## 2021-07-28 NOTE — PROGRESS NOTES
APPENDECTOMY  5/23/2016    NERVE BLOCK Bilateral 01/14/2020    cervical medial branch nerve block    NERVE BLOCK Bilateral 12/07/2020    cervical medial branch facet block     NERVE BLOCK Bilateral 12/7/2020    BILATERAL CERVICAL MEDIAL BRANCH FACET BLOCK C2,C3,C4 WITHOUT SEDATION (CPT 21660) performed by Dallas Montague MD at [address] Bilateral    [place] Bilateral 1998 r 2007 left     Prior to Admission medications    Medication Sig Start Date End Date Taking? Authorizing Provider   tiZANidine (ZANAFLEX) 4 MG tablet Take 1 tablet by mouth 2 times daily as needed (muscle spasms) 7/21/21  Yes Lorenzo Barnhart MD   simvastatin (ZOCOR) 20 MG tablet Take 1 tablet by mouth nightly 4/21/21  Yes Lorenzo Barnhart MD   pregabalin (LYRICA) 100 MG capsule Take 1 capsule by mouth 2 times daily for 30 days.  11/2/20 7/28/21 Yes Keri Louis, DO   Multiple Vitamins-Minerals (CENTRUM SPECIALIST HEART) TABS Take 1 tablet by mouth 2 times daily 8/12/20  Yes Lorenzo Barnhart MD   DULoxetine (CYMBALTA) 60 MG extended release capsule Take 60 mg by mouth daily  5/18/20  Yes Historical Provider, MD   DULoxetine (CYMBALTA) 30 MG extended release capsule Take 30 mg by mouth daily  5/18/20  Yes Historical Provider, MD   diclofenac (VOLTAREN) 75 MG EC tablet Take 1 tablet by mouth 2 times daily 6/11/20  Yes Eileen Guerra MD   BREO ELLIPTA 200-25 MCG/INH AEPB inhaler  2/27/20  Yes Historical Provider, MD   albuterol sulfate HFA (PROAIR HFA) 108 (90 Base) MCG/ACT inhaler Inhale 2 puffs into the lungs every 4 hours as needed for Wheezing or Shortness of Breath 10/25/19 7/28/21 Yes Fani Goetz MD   dorzolamide (TRUSOPT) 2 % ophthalmic solution 2 drops 3 times daily   Yes Historical Provider, MD   brimonidine (ALPHAGAN) 0.2 % ophthalmic solution INSTILL ONE DROP TWO TIMES EVERY DAY INTO BOTH EYES. 6/18/19  Yes Historical Provider, MD   doxepin (SINEQUAN) 50 MG capsule 50 mg as needed  9/25/17  Yes Historical Provider, MD   latanoprost (XALATAN) 0.005 % ophthalmic solution Place 1 drop into both eyes nightly    Yes Historical Provider, MD   colchicine (COLCRYS) 0.6 MG tablet Take 1 tablet by mouth 2 times daily  Patient not taking: Reported on 7/21/2021 7/12/21   Silva Mcduffie, APRN - CNP     Allergies   Allergen Reactions    Lipitor Anaphylaxis    Sulfa Antibiotics Anaphylaxis     Social History     Socioeconomic History    Marital status:      Spouse name: Not on file    Number of children: Not on file    Years of education: Not on file    Highest education level: Not on file   Occupational History    Not on file   Tobacco Use    Smoking status: Never Smoker    Smokeless tobacco: Never Used   Vaping Use    Vaping Use: Never used   Substance and Sexual Activity    Alcohol use: Yes     Comment: rarely    Drug use: No    Sexual activity: Yes   Other Topics Concern    Not on file   Social History Narrative    Not on file     Social Determinants of Health     Financial Resource Strain:     Difficulty of Paying Living Expenses:    Food Insecurity: No Food Insecurity    Worried About [address] [place] [address] in the Last Year: Never true    Cornel of Food in the Last Year: Never true   Transportation Needs:     Lack of Transportation (Medical):      Lack of Transportation (Non-Medical):    Physical Activity:     Days of Exercise per Week:     Minutes of Exercise per Session:    Stress: No Stress Concern Present    Feeling of Stress : Not at all   Social Connections:     Frequency of Communication with Friends and Family:     Frequency of Social Gatherings with Friends and Family:     Attends Temple Services:     Active Member of Clubs or Organizations:     Attends Club or Organization Meetings:     Marital Status:    Intimate Partner Violence:     Fear of Current or Ex-Partner:     Emotionally Abused:     Physically Abused:     Sexually Abused:      Family History   Problem Relation Age of Onset    Asthma Mother     High Blood Pressure Mother     Cancer Father         Lung    High Blood Pressure Father    [facility] Glaucoma Sister     Glaucoma Brother     Other Brother         Sarcoidosis of Lungs    Diabetes Paternal Grandmother     Heart Attack Paternal Grandfather     Drug Abuse Brother      REVIEW OF SYSTEMS:     Murtaza Venegas denies fever/chills, chest pain, shortness of breath, new bowel or bladder complaints. All other review of systems was negative. PHYSICAL EXAMINATION:      /68   Pulse 64   Temp 98 °F (36.7 °C)   Resp 20   Ht 5' 9\" (1.753 m)   Wt 220 lb (99.8 kg)   SpO2 98%   BMI 32.49 kg/m²     General:       General appearance: awake, alert, oriented   pleasant and well-hydrated. , in moderate discomfort and A & O x3  Build:Normal Weight  Function:Rises from a seated position easily.     HEENT:     Head:normocephalic and atraumatic  Sclera: icterus absent,      Lungs:     Breathing:Breathing Pattern: regular, no distress     Abdomen:     Shape:non-distended and normal  Scars:yes  Tenderness:none     Cervical spine:     Inspection:anterior fusion scar  Palpation:mild facet tenderness bilaterally. Range of motion:Limited and painful ROM bilaterally. .     Musculoskeletal:     Trigger points in Paraveteral:present bilaterally  Spurling's:negative right, negative left     Extremities:     Tremors:None bilaterally upper and lower  Range of motion:Generally normal shoulders  Intact:Yes  Edema:Normal    Knee:     Inspection:symmetric, swelling none bilaterally  Tenderness of Bony Landmarks:Medial, left  Drawer Test:negative  Effusion:absent bilaterally  Crepitus:present left  ROM:Left limited by pain      Neurological:     Sensory:normal to joint position sense and light touch   Motor:   Right Grip5/5              Left Grip5/5               Right Bicep5/5           Left Bicep5/5              Right Triceps5/5       Left Triceps5/5 Right Deltoid5/5     Left Deltoid5/5                  Reflexes:    Right Brachioradialis reflex2+  Left Brachioradialis reflex2+  Right Biceps reflex2+  Left Biceps reflex2+  Right Triceps reflex2+  Left Triceps reflex2+  Gait:normal     Dermatology:     Skin:no unusual rashes and no skin lesions     Assessment/Plan:   Chronic neck pain with radiation to the Left upper extremity that started after a work related injury in 2016   Patient had seen Cathy Segura but he didn't recommend surgery   Patient had seen  at [facility] clinic and had underwent C4-5/C5-6 anterior fusion 11/2018. Good outcome with cervical facet MBB 05/2021  Plan:  Office extension for worsening neck pain with radiation to the right arm and new onset left knee pain. Reviewed cervical spine imaging studies and discussed with the patient. Discussed cervical epidural steroid injection C6-7 right paramedian, if less than expected response, will schedule patient for cervical spine MRI. Reviewed updated imaging studies for his Left knee. Discussed Left knee injection patient agrees. Currently seeing Alejandra Foil and is on Lyrica/Voltaren/Cymbalta/Flexeril. [place]AR[place] report reviewed 07/2021. Patient encouraged to stay active. Treatment plan discussed with the patient including medications and procedure side effects. We discussed with the patient that combining opioids, benzodiazepines, alcohol, illicit drugs or sleep aids increases the risk of respiratory depression including death. We discussed that these medications may cause drowsiness, sedation or dizziness and have counseled the patient not to drive or operate machinery. We have discussed that these medications will be prescribed only by one provider. We have discussed with the patient about age related risk factors and have thoroughly discussed the importance of taking these medications as prescribed. The patient verbalizes understanding. ccreferring marta Price M.D.      2021    Patient: Sukhjinder Torres  :  1965  Age:  55 y.o. Sex:  male     PRE-OPERATIVE DIAGNOSIS: Left  Knee pain, osteoarthitis. POST-OPERATIVE DIAGNOSIS: Same. PROCEDURE PERFORMED:  Left knee injection. SURGEON:   ANITA Hoffman M.D. ANESTHESIA: Local    ESTIMATED BLOOD LOSS: None. BRIEF HISTORY: Sukhjinder Torres comes in today for Left  knee injection. . After discussing the potential risks and benefits of the procedure with the patient. Karen Etienne did request that we proceed. A complete History & Physical was reviewed and it is unchanged. DESCRIPTION OF PROCEDURE:   The patient was placed in a seated position. The area of the Left knee was prepped with chloraprep and draped in a sterile manner. The overlying skin and subcutaneous tissues were anesthetized with 0.5% Lidocaine. Then the targeted area of the patellar tendon was palpated and marked. A 25 gauge 1 1/2 inch needle was advanced into the joint capsule. Confirmation of needle placement was obtained by direct visualization of synovial fluid by aspiration. A solution of 0.25 % marcaine 3 cc and 80 mg DepoMedrol was injected easily without complications. The needle was then removed and Band-Aid applied. Disposition the patient tolerated the procedure well and there were no complications . Karen Etienne will follow up in our [facility] as scheduled. He was encouraged to call with questions, concerns or if worsening of symptoms occurs.     Fady Shannon MD

## 2021-08-02 NOTE — TELEPHONE ENCOUNTER
I spoke with patient regarding this. He sees pain management. Likely related to his cervical disc disease.

## 2021-08-18 ENCOUNTER — OFFICE VISIT (OUTPATIENT)
Dept: FAMILY MEDICINE CLINIC | Age: 56
End: 2021-08-18
Payer: COMMERCIAL

## 2021-08-18 VITALS
WEIGHT: 220 LBS | RESPIRATION RATE: 20 BRPM | HEIGHT: 69 IN | TEMPERATURE: 97.6 F | SYSTOLIC BLOOD PRESSURE: 130 MMHG | OXYGEN SATURATION: 97 % | HEART RATE: 74 BPM | BODY MASS INDEX: 32.58 KG/M2 | DIASTOLIC BLOOD PRESSURE: 80 MMHG

## 2021-08-18 DIAGNOSIS — Z12.5 PROSTATE CANCER SCREENING: ICD-10-CM

## 2021-08-18 DIAGNOSIS — Z13.1 DIABETES MELLITUS SCREENING: ICD-10-CM

## 2021-08-18 DIAGNOSIS — L60.1 ONYCHOLYSIS: ICD-10-CM

## 2021-08-18 DIAGNOSIS — Z00.00 WELLNESS EXAMINATION: Primary | ICD-10-CM

## 2021-08-18 DIAGNOSIS — E78.2 MIXED HYPERLIPIDEMIA: ICD-10-CM

## 2021-08-18 PROCEDURE — 99396 PREV VISIT EST AGE 40-64: CPT | Performed by: FAMILY MEDICINE

## 2021-08-18 ASSESSMENT — ENCOUNTER SYMPTOMS
VOMITING: 0
DIARRHEA: 0
SHORTNESS OF BREATH: 0
NAUSEA: 0

## 2021-08-18 ASSESSMENT — SOCIAL DETERMINANTS OF HEALTH (SDOH): HOW HARD IS IT FOR YOU TO PAY FOR THE VERY BASICS LIKE FOOD, HOUSING, MEDICAL CARE, AND HEATING?: NOT HARD AT ALL

## 2021-08-18 NOTE — PROGRESS NOTES
Annual exam:  Patient is here for routine yearly physical/preventative visit. Patient has no complaints or concerns today. Patient is  generally healthy. Chronic medical problems include hyperlipidemia. These are generally controlled. /80 today. Health maintenancereviewed with patient and is not up to date. Patient does not smoke. Patient does drink alcohol. Patient  does not use drugs. Overall doing well. Patient's pastmedical, surgical, social and/or family history reviewed, updated in chart, and are non-contributory (unless otherwise stated). Medications and allergies also reviewed and updated in chart. Current Outpatient Medications   Medication Sig Dispense Refill    tiZANidine (ZANAFLEX) 4 MG tablet Take 1 tablet by mouth 2 times daily as needed (muscle spasms) 60 tablet 2    colchicine (COLCRYS) 0.6 MG tablet Take 1 tablet by mouth 2 times daily 20 tablet 0    simvastatin (ZOCOR) 20 MG tablet Take 1 tablet by mouth nightly 30 tablet 5    Multiple Vitamins-Minerals (CENTRUM SPECIALIST HEART) TABS Take 1 tablet by mouth 2 times daily      DULoxetine (CYMBALTA) 60 MG extended release capsule Take 60 mg by mouth daily       DULoxetine (CYMBALTA) 30 MG extended release capsule Take 30 mg by mouth daily       diclofenac (VOLTAREN) 75 MG EC tablet Take 1 tablet by mouth 2 times daily 60 tablet 3    BREO ELLIPTA 200-25 MCG/INH AEPB inhaler       dorzolamide (TRUSOPT) 2 % ophthalmic solution 2 drops 3 times daily      brimonidine (ALPHAGAN) 0.2 % ophthalmic solution INSTILL ONE DROP TWO TIMES EVERY DAY INTO BOTH EYES.  3    doxepin (SINEQUAN) 50 MG capsule 50 mg as needed       latanoprost (XALATAN) 0.005 % ophthalmic solution Place 1 drop into both eyes nightly       pregabalin (LYRICA) 100 MG capsule Take 1 capsule by mouth 2 times daily for 30 days.  60 capsule 2    albuterol sulfate HFA (PROAIR HFA) 108 (90 Base) MCG/ACT inhaler Inhale 2 puffs into the lungs every 4 hours as needed for Wheezing or Shortness of Breath 1 Inhaler 6     No current facility-administered medications for this visit. /80   Pulse 74   Temp 97.6 °F (36.4 °C)   Resp 20   Ht 5' 9\" (1.753 m)   Wt 220 lb (99.8 kg)   SpO2 97%   BMI 32.49 kg/m²     Review of Systems   Eyes: Negative for visual disturbance. Respiratory: Negative for shortness of breath. Cardiovascular: Positive for chest pain (right-sided). Negative for palpitations and leg swelling. Gastrointestinal: Negative for diarrhea, nausea and vomiting. Genitourinary: Negative for difficulty urinating, dysuria and frequency. Skin: Negative for rash. Right thumb nail discoloration still persists   Psychiatric/Behavioral: Negative for dysphoric mood. Physical Exam  Vitals reviewed. Constitutional:       General: He is not in acute distress. Appearance: He is well-developed. Neck:      Vascular: No carotid bruit. Cardiovascular:      Rate and Rhythm: Normal rate and regular rhythm. Heart sounds: Normal heart sounds. No murmur heard. No gallop. Pulmonary:      Effort: Pulmonary effort is normal.      Breath sounds: Normal breath sounds. No wheezing or rales. Abdominal:      General: Bowel sounds are normal. There is no distension. Palpations: Abdomen is soft. Tenderness: There is no abdominal tenderness. Musculoskeletal:      Cervical back: Neck supple. Right lower leg: No edema. Left lower leg: No edema. Skin:     General: Skin is warm and dry. Neurological:      Mental Status: He is alert and oriented to person, place, and time. Kraig Watts was seen today for annual exam.    Diagnoses and all orders for this visit:    Wellness examination  -     CBC; Future  -     Comprehensive Metabolic Panel; Future  -     Lipid Panel; Future  -     TSH without Reflex; Future  -     PSA screening;  Future    Onycholysis  -     AFL - Riddhi Ramirez MD, Dermatology, [place] hyperlipidemia  -     Comprehensive Metabolic Panel; Future  -     Lipid Panel; Future  -     TSH without Reflex; Future    Prostate cancer screening  -     PSA screening; Future    Diabetes mellitus screening  -     Comprehensive Metabolic Panel; Future        Return in about 6 months (around 2/18/2022) for hyperlipidemia. , or sooner if necessary. Phone/MyChart follow up if tests abnormal.    Educational materials and/or home exercises printed for patient's review and were included in patient instructions on his/her After Visit Summary and given to patient at the end of visit. Counseled regarding above diagnosis, including possible risks and complications,  especially if left uncontrolled. Counseled regarding thepossible side effects, risks, benefits and alternatives to treatment; patient and/or guardian verbalizes understanding, agrees, feels comfortable with and wishes to proceed with above treatment plan. Advised patientto call with any new medication issues, and read all Rx info from pharmacy to assure aware of all possible risks and side effects of medication before taking. Reviewed age and gender appropriate health screeningexams and vaccinations. Advised patient regarding importance of keeping up with recommended health maintenance and to schedule as soon as possible if overdue, as this is important in assessing for undiagnosed pathology,especially cancer, as well as protecting against potentially harmful/life threatening disease. Patient and/or guardian verbalizes understanding and agrees with above counseling, assessment and plan. Allquestions answered.

## 2021-08-18 NOTE — PATIENT INSTRUCTIONS
Patient Education        Well Visit, Men 48 to 72: Care Instructions  Overview     Well visits can help you stay healthy. Your doctor has checked your overall health and may have suggested ways to take good care of yourself. Your doctor also may have recommended tests. At home, you can help prevent illness with healthy eating, regular exercise, and other steps. Follow-up care is a key part of your treatment and safety. Be sure to make and go to all appointments, and call your doctor if you are having problems. It's also a good idea to know your test results and keep a list of the medicines you take. How can you care for yourself at home? · Get screening tests that you and your doctor decide on. Screening helps find diseases before any symptoms appear. · Eat healthy foods. Choose fruits, vegetables, whole grains, protein, and low-fat dairy foods. Limit fat, especially saturated fat. Reduce salt in your diet. · Limit alcohol. Have no more than 2 drinks a day or 14 drinks a week. · Get at least 30 minutes of exercise on most days of the week. Walking is a good choice. You also may want to do other activities, such as running, swimming, cycling, or playing tennis or team sports. · Reach and stay at a healthy weight. This will lower your risk for many problems, such as obesity, diabetes, heart disease, and high blood pressure. · Do not smoke. Smoking can make health problems worse. If you need help quitting, talk to your doctor about stop-smoking programs and medicines. These can increase your chances of quitting for good. · Care for your mental health. It is easy to get weighed down by worry and stress. Learn strategies to manage stress, like deep breathing and mindfulness, and stay connected with your family and community. If you find you often feel sad or hopeless, talk with your doctor. Treatment can help.   · Talk to your doctor about whether you have any risk factors for sexually transmitted infections (STIs). You can help prevent STIs if you wait to have sex with a new partner (or partners) until you've each been tested for STIs. It also helps if you use condoms (male or female condoms) and if you limit your sex partners to one person who only has sex with you. Vaccines are available for some STIs. · If it's important to you to prevent pregnancy with your partner, talk with your doctor about birth control options that might be best for you. · If you think you may have a problem with alcohol or drug use, talk to your doctor. This includes prescription medicines (such as amphetamines and opioids) and illegal drugs (such as cocaine and methamphetamine). Your doctor can help you figure out what type of treatment is best for you. · Protect your skin from too much sun. When you're outdoors from 10 a.m. to 4 p.m., stay in the shade or cover up with clothing and a hat with a wide brim. Wear sunglasses that block UV rays. Even when it's cloudy, put broad-spectrum sunscreen (SPF 30 or higher) on any exposed skin. · See a dentist one or two times a year for checkups and to have your teeth cleaned. · Wear a seat belt in the car. When should you call for help? Watch closely for changes in your health, and be sure to contact your doctor if you have any problems or symptoms that concern you. Where can you learn more? Go to https://[employer]. org and sign in to your [employer] account. Enter M636 in the [facility] box to learn more about \"Well Visit, Men 48 to 72: Care Instructions. \"     If you do not have an account, please click on the \"Sign Up Now\" link. Current as of: May 27, 2020               Content Version: 12.9  © 2006-2021 [employer]. Care instructions adapted under license by [facility] ([facility]).  If you have questions about a medical condition or this instruction, always ask your healthcare professional. [address]  any warranty or liability for your use of this information.

## 2021-08-23 ENCOUNTER — HOSPITAL ENCOUNTER (OUTPATIENT)
Dept: OPERATING ROOM | Age: 56
Setting detail: OUTPATIENT SURGERY
Discharge: HOME | End: 2021-08-23
Attending: PAIN MEDICINE
Payer: COMMERCIAL

## 2021-08-23 ENCOUNTER — HOSPITAL ENCOUNTER (OUTPATIENT)
Age: 56
Setting detail: OUTPATIENT SURGERY
Discharge: HOME | End: 2021-08-23
Attending: PAIN MEDICINE | Admitting: PAIN MEDICINE
Payer: COMMERCIAL

## 2021-08-23 VITALS
SYSTOLIC BLOOD PRESSURE: 114 MMHG | HEART RATE: 66 BPM | OXYGEN SATURATION: 98 % | BODY MASS INDEX: 32.58 KG/M2 | WEIGHT: 220 LBS | HEIGHT: 69 IN | RESPIRATION RATE: 16 BRPM | DIASTOLIC BLOOD PRESSURE: 74 MMHG

## 2021-08-23 DIAGNOSIS — M54.12 CERVICAL RADICULOPATHY, CHRONIC: ICD-10-CM

## 2021-08-23 PROCEDURE — 2500000003 HC RX 250 WO HCPCS: Performed by: PAIN MEDICINE

## 2021-08-23 PROCEDURE — 62321 NJX INTERLAMINAR CRV/THRC: CPT | Performed by: PAIN MEDICINE

## 2021-08-23 PROCEDURE — 7100000010 HC PHASE II RECOVERY - FIRST 15 MIN: Performed by: PAIN MEDICINE

## 2021-08-23 PROCEDURE — 7100000011 HC PHASE II RECOVERY - ADDTL 15 MIN: Performed by: PAIN MEDICINE

## 2021-08-23 PROCEDURE — 3209999900 FLUORO FOR SURGICAL PROCEDURES

## 2021-08-23 PROCEDURE — 2709999900 HC NON-CHARGEABLE SUPPLY: Performed by: PAIN MEDICINE

## 2021-08-23 PROCEDURE — 6360000002 HC RX W HCPCS: Performed by: PAIN MEDICINE

## 2021-08-23 PROCEDURE — 3600000005 HC SURGERY LEVEL 5 BASE: Performed by: PAIN MEDICINE

## 2021-08-23 PROCEDURE — 6360000004 HC RX CONTRAST MEDICATION: Performed by: PAIN MEDICINE

## 2021-08-23 RX ORDER — LIDOCAINE HYDROCHLORIDE 5 MG/ML
INJECTION, SOLUTION INFILTRATION; INTRAVENOUS PRN
Status: DISCONTINUED | OUTPATIENT
Start: 2021-08-23 | End: 2021-08-23 | Stop reason: ALTCHOICE

## 2021-08-23 ASSESSMENT — PAIN - FUNCTIONAL ASSESSMENT: PAIN_FUNCTIONAL_ASSESSMENT: 0-10

## 2021-08-23 ASSESSMENT — PAIN SCALES - GENERAL
PAINLEVEL_OUTOF10: 0
PAINLEVEL_OUTOF10: 0

## 2021-08-23 NOTE — OP NOTE
2021    Patient: Carmella Stein  :  1965  Age:  55 y.o. Sex:  male     PRE-PROCEDURE DIAGNOSIS: Cervical degenerative disc disease, cervical radicular pain. POST-PROCEDURE DIAGNOSIS: Same. PROCEDURE: Fluoroscopic guided cervical epidural steroid injection, #2 at C6-7 level. SURGEON: ANITA Sheehan M.D. ANESTHESIA: Local    ESTIMATED BLOOD LOSS: None.  ______________________________________________________________________  BRIEF HISTORY:  Carmella Stein comes in today for the second  therapeutic cervical epidural injection under fluoroscopic guidance. The potential complications of this procedure were discussed with the him again today. He has elected to undergo the aforementioned procedure. Tre[place] complete History & Physical examination were reviewed in depth, a copy of which is in the chart. DESCRIPTION OF PROCEDURE:    After confirming written and informed consent, a time-out was performed and [place] name and date of birth, the procedure to be performed as well as the plan for the location of the needle insertion were confirmed. The patient was brought into the procedure room and placed in the prone position with the head flexed midline on the fluoroscopy table. A pillow was placed under the patient's head to increase cervical interlaminar space. Standard monitors were placed, and vital signs were observed throughout the procedure. The area was prepped with chloraprep and the C6-7 interspace was marked under fluoroscopy. The skin and subcutaneous tissues at the above level were anesthestized with 0.5% lidocaine. An # 18 gauge 3 1/2 tuohy epidural needle was inserted and advanced toward the inferior lamina until bony contact was made. The needle was then advanced superiorly toward epidural space .  From this point on hanging drop/loss of resistance technique with 5 cc glass syringe was used to confirm entrance of the needle into the epidural space under intermittent lateral fluoroscopy. Once in the epidural space , negative aspiration for blood and CSF was confirmed . Needle tip placement was confirmed by visualizing epidural spread of 0.5 ml of omnipaque 240 visualized in both AP and lateral live fluoroscopic views. Then after negative aspiration, a solution of 0.5 % Lidocaine 1 ml and 80 mg DepoMedrol was easily injected. The needle was gently removed intact. The patient neck was cleaned and a Band-Aid was placed over the needle insertion point. Disposition the patient tolerated the procedure well and there were no complications . Vital signs remained stable throughout the procedure. The patient was escorted to the recovery area where they remained until discharge and written discharge instructions for the procedure were given. Plan: Moses Vallecillo will return to our pain management center as scheduled.      Jr Quintana MD

## 2021-08-23 NOTE — H&P
[facility]  [address] [facility], [address] [place] [address]  [phone]    Procedure History & Physical      [facility]     HPI:    Patient  is here for neck and right upper extremity pain for JORGE C6-7. Labs/imaging studies reviewed   All question and concerns addressed including R/B/A associated with the procedure    Past Medical History:   Diagnosis Date    Asthma     due to exposure to chemical    Glaucoma     Neck pain        Past Surgical History:   Procedure Laterality Date    ANESTHESIA NERVE BLOCK Bilateral 01/14/2020    BILATERAL CERVICAL MEDIAL BRANCH NERVE BLOCK UNDER FLUOROSCOPIC GUIDANCE AT C2,C3 AND C4 WITHOUT SEDATION (CPT 41729,08800) performed by Karlo Barnes MD at [facility]  11/07/2018    EYE SURGERY Left     due to glaucoma    LAPAROSCOPIC APPENDECTOMY  05/23/2016    NERVE BLOCK Bilateral 01/14/2020    cervical medial branch nerve block    NERVE BLOCK Bilateral 12/07/2020    cervical medial branch facet block     NERVE BLOCK Bilateral 12/07/2020    BILATERAL CERVICAL MEDIAL BRANCH FACET BLOCK C2,C3,C4 WITHOUT SEDATION (CPT 14167) performed by Karlo Barnes MD at [facility] Bilateral    [place] Bilateral 1998 r 2007 left       Prior to Admission medications    Medication Sig Start Date End Date Taking?  Authorizing Provider   tiZANidine (ZANAFLEX) 4 MG tablet Take 1 tablet by mouth 2 times daily as needed (muscle spasms) 7/21/21  Yes Yasmin Carty MD   diclofenac (VOLTAREN) 75 MG EC tablet Take 1 tablet by mouth 2 times daily 6/11/20  Yes Diana Light MD   albuterol sulfate HFA (PROAIR HFA) 108 (90 Base) MCG/ACT inhaler Inhale 2 puffs into the lungs every 4 hours as needed for Wheezing or Shortness of Breath 10/25/19 8/20/21 Yes Eliane Delong MD   colchicine (COLCRYS) 0.6 MG tablet Take 1 tablet by mouth 2 times daily 7/12/21   Nicci Riggins Tejas, APRN - CNP   simvastatin (ZOCOR) 20 MG tablet Take 1 tablet by mouth nightly 4/21/21   Indu Mak MD   pregabalin (LYRICA) 100 MG capsule Take 1 capsule by mouth 2 times daily for 30 days.  11/2/20 7/28/21  Honorkelechi Joaquinl, DO   Multiple Vitamins-Minerals (CENTRUM SPECIALIST HEART) TABS Take 1 tablet by mouth 2 times daily 8/12/20   Indu Mak MD   DULoxetine (CYMBALTA) 60 MG extended release capsule Take 60 mg by mouth daily  5/18/20   Historical Provider, MD   DULoxetine (CYMBALTA) 30 MG extended release capsule Take 30 mg by mouth daily  5/18/20   Historical Provider, MD Jono Jacobson 200-25 MCG/INH AEPB inhaler  2/27/20   Historical Provider, MD   dorzolamide (TRUSOPT) 2 % ophthalmic solution 2 drops 3 times daily    Historical Provider, MD   brimonidine (ALPHAGAN) 0.2 % ophthalmic solution INSTILL ONE DROP TWO TIMES EVERY DAY INTO BOTH EYES. 6/18/19   Historical Provider, MD   doxepin (SINEQUAN) 50 MG capsule 50 mg as needed  9/25/17   Historical Provider, MD   latanoprost (XALATAN) 0.005 % ophthalmic solution Place 1 drop into both eyes nightly     Historical Provider, MD       Allergies   Allergen Reactions    Lipitor Anaphylaxis    Sulfa Antibiotics Anaphylaxis       Social History     Socioeconomic History    Marital status:      Spouse name: Not on file    Number of children: Not on file    Years of education: Not on file    Highest education level: Not on file   Occupational History    Not on file   Tobacco Use    Smoking status: Never Smoker    Smokeless tobacco: Never Used   Vaping Use    Vaping Use: Never used   Substance and Sexual Activity    Alcohol use: Yes     Comment: rare wine    Drug use: No    Sexual activity: Yes   Other Topics Concern    Not on file   Social History Narrative    Not on file     Social Determinants of Health     Financial Resource Strain: Low Risk     Difficulty of Paying Living Expenses: Not hard at all   Food Insecurity: No Food Insecurity    Worried About Running Out of Food in the Last Year: Never true    Ran Out of Food in the Last Year: Never true   Transportation Needs:     Lack of Transportation (Medical):  Lack of Transportation (Non-Medical):    Physical Activity:     Days of Exercise per Week:     Minutes of Exercise per Session:    Stress: No Stress Concern Present    Feeling of Stress : Not at all   Social Connections:     Frequency of Communication with Friends and Family:     Frequency of Social Gatherings with Friends and Family:     Attends Voodoo Services:     Active Member of Clubs or Organizations:     Attends Club or Organization Meetings:     Marital Status:    Intimate Partner Violence:     Fear of Current or Ex-Partner:     Emotionally Abused:     Physically Abused:     Sexually Abused:        Family History   Problem Relation Age of Onset    Asthma Mother     High Blood Pressure Mother     Cancer Father         Lung    High Blood Pressure Father     Glaucoma Sister     Glaucoma Brother     Other Brother         Sarcoidosis of Lungs    Diabetes Paternal Grandmother     Heart Attack Paternal Grandfather     Drug Abuse Brother          REVIEW OF SYSTEMS:    CONSTITUTIONAL:  negative for  fevers, chills, sweats and fatigue    RESPIRATORY:  negative for  dry cough, cough with sputum, dyspnea, wheezing and chest pain    CARDIOVASCULAR:  negative for chest pain, dyspnea, palpitations, syncope    GASTROINTESTINAL:  negative for nausea, vomiting, change in bowel habits, diarrhea, constipation and abdominal pain    MUSCULOSKELETAL: negative for muscle weakness    SKIN: negative for itching or rashes.     BEHAVIOR/PSYCH:  negative for poor appetite, increased appetite, decreased sleep and poor concentration    All other systems negative      PHYSICAL EXAM:    VITALS:  BP (!) 133/94   Pulse 71   Resp 23   Ht 5' 9\" (1.753 m)   Wt 220 lb (99.8 kg)   SpO2 98%   BMI 32.49 kg/m²     CONSTITUTIONAL:  awake, alert, cooperative, no apparent distress, and appears stated age    EYES: PERRLA, EOMI    LUNGS:  No increased work of breathing, no audible wheezing    CARDIOVASCULAR:  regular rate and rhythm    ABDOMEN:  Soft non tender non distended     EXTREMITIES: no signs of clubbing or cyanosis. MUSCULOSKELETAL: negative for flaccid muscle tone or spastic movements. SKIN: gross examination reveals no signs of rashes, or diaphoresis. NEURO: Cranial nerves II-XII grossly intact. No signs of agitated mood. Assessment/Plan:    Neck and right upper extremity pain for JORGE C6-7.

## 2021-08-30 ENCOUNTER — OFFICE VISIT (OUTPATIENT)
Dept: PHYSICAL MEDICINE AND REHAB | Age: 56
End: 2021-08-30
Payer: COMMERCIAL

## 2021-08-30 VITALS
HEIGHT: 69 IN | WEIGHT: 221 LBS | BODY MASS INDEX: 32.73 KG/M2 | SYSTOLIC BLOOD PRESSURE: 135 MMHG | HEART RATE: 64 BPM | DIASTOLIC BLOOD PRESSURE: 90 MMHG

## 2021-08-30 DIAGNOSIS — G89.29 CHRONIC SHOULDER PAIN, UNSPECIFIED LATERALITY: ICD-10-CM

## 2021-08-30 DIAGNOSIS — M50.221 HERNIATED NUCLEUS PULPOSUS, C4-5: ICD-10-CM

## 2021-08-30 DIAGNOSIS — S13.9XXS NECK SPRAIN, SEQUELA: ICD-10-CM

## 2021-08-30 DIAGNOSIS — M54.12 C6 RADICULOPATHY: ICD-10-CM

## 2021-08-30 DIAGNOSIS — M25.519 CHRONIC SHOULDER PAIN, UNSPECIFIED LATERALITY: ICD-10-CM

## 2021-08-30 PROCEDURE — 99214 OFFICE O/P EST MOD 30 MIN: CPT | Performed by: PHYSICAL MEDICINE & REHABILITATION

## 2021-08-30 RX ORDER — PREGABALIN 100 MG/1
200 CAPSULE ORAL 2 TIMES DAILY
Qty: 60 CAPSULE | Refills: 2 | Status: SHIPPED
Start: 2021-08-30 | End: 2021-12-13 | Stop reason: DRUGHIGH

## 2021-08-30 NOTE — PROGRESS NOTES
Raymond Otto D.O., P.T. [facility]  [address] [facility]. [place], [address] [place] [address]  Phone: [phone]  Fax: [phone]      8/30/2021    [facility] Claim #: [id number]   [facility] DOI: 4/19/16   [facility] POR:Dr. Jasen Gimenez  [facility] ATTY: Gopi Stovall, HORACE. [facility] Alllowed conditions:  F13.675Z CONTUSION OF LEFT SHOULDER LEFT, S60.221A CONTUSION OF RIGHT HAND RIGHT,  H94.539S UNSPECIFIED SPRAIN OF LEFT SHOULDER JOINT LEFT, H60.522 ACUTE CHEMICAL OTITIS EXTERNA, LEFT EAR, S13. 4XXA SPRAIN OF LIGAMENTS OF CERVICAL SPINE, S46.012A FULL THICKNESS ROTATOR CUFF TEAR SHOULDER  LEFT, S46.112A RUPTURED LONG HEAD BICEP TENDON SHOULDER  LEFT, M19.012 SUB AGG ACROMIOCLAVICULAR ARTHROSIS SHOULDER  LEFT, M54.12 RADICULOPATHY, CERVICAL REGION  C6.    [facility] Dismissed Conditions:  M18.11 ARTHROSIS OF THE FIRST METACARPOPHALANGEAL JOINT HAND  RIGHT, M65.841 TENOSYNOVITIS HAND  RIGHT, S62.614S DISP FX OF PROXIMAL PHALANX OF RIGHT RING FINGER, SEQUELA RIGHT, S46.012A PARTIAL THICKNESS ROTATOR CUFF TEAR SHOULDER  LEFT. Chief Complaint   Patient presents with    Neck Pain     workers comp follow up   HPI:  Since the last visit the patient had increased neck and right arm pain after lifting some bags of mulch with his son. He had an epidural by Dr. Krystal Cordero last week. Today, he has pain in his neck and radiating to his right bicep, chest wall, medial forearm, fourth and fifth digits and is described as burning, aching. Pain is currently rated Pain Score:   3. There is associated cervical crepitus, stiffness. The neck pain is worse with movement, better with Flexeril, Cymbalta, Lyrica, Diclofenac. There is associated weakness, paresthesias. Past Medical History:   Diagnosis Date    Asthma     due to exposure to chemical    Glaucoma     Neck pain      The injured worker denies any prior injury to the same body area injured in the claim.      Past Surgical History: Procedure Laterality Date    ANESTHESIA NERVE BLOCK Bilateral 01/14/2020    BILATERAL CERVICAL MEDIAL BRANCH NERVE BLOCK UNDER FLUOROSCOPIC GUIDANCE AT C2,C3 AND C4 WITHOUT SEDATION (CPT 00595,29806) performed by Adrianna Hough MD at [address] [facility]  11/07/2018    EYE SURGERY Left     due to glaucoma    LAPAROSCOPIC APPENDECTOMY  05/23/2016    NERVE BLOCK Bilateral 01/14/2020    cervical medial branch nerve block    NERVE BLOCK Bilateral 12/07/2020    cervical medial branch facet block     NERVE BLOCK Bilateral 12/07/2020    BILATERAL CERVICAL MEDIAL BRANCH FACET BLOCK C2,C3,C4 WITHOUT SEDATION (CPT 92715) [facility] [address] 8/23/2021    CERVICAL EPIDURAL STEROID INJECTION C6-7 RIGHT PARAMEDIAN WITH 80 DEPO performed by Adrianna Hough MD at [address] [facility] Bilateral    [place] Bilateral 1998 r 2007 left     The injured worker denies any prior surgeries to the same body area injured in the claim. Social History     Social History    Marital status:      Social History Main Topics    Smoking status: Never Smoker    Smokeless tobacco: Never Used    Alcohol use Yes      Comment: rarely    Drug use: No    Sexual activity: Not on file     Family History   Problem Relation Age of Onset    Asthma Mother     High Blood Pressure Mother     Cancer Father         Lung    High Blood Pressure Father     Glaucoma Sister     Glaucoma Brother     Other Brother         Sarcoidosis of Lungs    Diabetes Paternal Grandmother     Heart Attack Paternal Grandfather     Drug Abuse Brother        Allergies   Allergen Reactions    Lipitor Anaphylaxis    Sulfa Antibiotics Anaphylaxis       Current Outpatient Medications   Medication Sig Dispense Refill    pregabalin (LYRICA) 100 MG capsule Take 2 capsules by mouth 2 times daily for 30 days.  60 capsule 2    tiZANidine (ZANAFLEX) 4 MG tablet Take 1 tablet by mouth 2 times daily as needed (muscle spasms) 60 tablet 2    colchicine (COLCRYS) 0.6 MG tablet Take 1 tablet by mouth 2 times daily 20 tablet 0    simvastatin (ZOCOR) 20 MG tablet Take 1 tablet by mouth nightly 30 tablet 5    Multiple Vitamins-Minerals (CENTRUM SPECIALIST HEART) TABS Take 1 tablet by mouth 2 times daily      DULoxetine (CYMBALTA) 60 MG extended release capsule Take 60 mg by mouth daily       DULoxetine (CYMBALTA) 30 MG extended release capsule Take 30 mg by mouth daily       diclofenac (VOLTAREN) 75 MG EC tablet Take 1 tablet by mouth 2 times daily 60 tablet 3    BREO ELLIPTA 200-25 MCG/INH AEPB inhaler       dorzolamide (TRUSOPT) 2 % ophthalmic solution 2 drops 3 times daily      brimonidine (ALPHAGAN) 0.2 % ophthalmic solution INSTILL ONE DROP TWO TIMES EVERY DAY INTO BOTH EYES.  3    doxepin (SINEQUAN) 50 MG capsule 50 mg as needed       latanoprost (XALATAN) 0.005 % ophthalmic solution Place 1 drop into both eyes nightly       albuterol sulfate HFA (PROAIR HFA) 108 (90 Base) MCG/ACT inhaler Inhale 2 puffs into the lungs every 4 hours as needed for Wheezing or Shortness of Breath 1 Inhaler 6     No current facility-administered medications for this visit. ROS: No new weakness, paresthesia, incontinence of bowel or bladder, saddle anesthesia, falls or gait dysfunction. Physical Exam: Blood pressure (!) 135/90, pulse 64, height 5' 9\" (1.753 m), weight 221 lb (100.2 kg). General: The patient is in no apparent distress. Body habitus is non-obese. HEENT: No rhinorrhea, sneezing, yawning, or lacrimation. No scleral icterus or conjunctival injection. SKIN: No piloerection. No track marks. No rash. Normal turgor. No erythema or ecchymosis. Psychological: Mood and affect are appropriate. Hygiene is appropriate. Cardiovascular:  Heart is regular rate and rhythm.   Peripheral pulses are 2+ at the dorsalis pedis, posterior tibial and radial arteries. There is no edema. Respiratory: Respirations are regular and unlabored. There is no cyanosis. Lymphatic: There is no cervical or inguinal lymphadenopathy. Gastrointestinal: Soft abdomen, non-tender. No pulsating abdominal mass. Genitourinary: No costovertebral angle tenderness. MSK: Cervical: Cervical lordosis increased, head posture in retrocollis,  thoracic kyphosis normal and lumbar lordosis normal. No superficial or bony tenderness. Tender at bilateral cervical paraspinals and trapezius. No edema, erythema, ecchymosis, effusion, instability, mass or deformity. No midline bony tenderness. Trigger points present bilateral trapezius. Tender taut bands present in bilateral cervical paraspinals. Spurling is positive left. Cervical AROM flexion is 60*, extension is 20*, lateral flexion is 30* bilaterally, rotation is 60* bilaterally. Shoulder: No edema, erythema, ecchymosis, effusion, instability, mass or deformity. Shoulder AROM is full. No painful arc. No crepitus. No tenderness to palpation at the acromioclavicular joint, subacromial space or biceps tendon insertion. Negative Esequiel-Koch, Scarf,  Drop arm, and Speeds. Neurologic: Awake, alert and oriented in three planes. Speech is fluent. No facial weakness. Tongue is midline. Hearing is intact for conversation. Pupils are equal and round. Extraocular muscles are intact. Hearing is intact for conversation. Shoulder shrug symmetric. Sensation: Intact for light touch and pin prick in all upper and lower extremity dermatomes. Vibration and proprioception are intact at the bilateral first MTP. Strength: 4/5 left shoulder abduction, otherwise, 5/5 in all myotomes in the upper and lower extremities. Muscle Tendon Reflexes: 1+ symmetric in the bilateral upper and 2+ lower extremities. Babinski is downgoing bilaterally. Roylene Perkins is negative bilaterally. Gait is Normal.   Romberg is negative.   Heel and toe walk are normal.  Tandem walk is normal.  No clonus or spasticity. The patient was able to rise from a chair and squat without difficulty. There is no tremor. Impression:   1. Herniated nucleus pulposus, C4-5    2. C6 radiculopathy    3. Neck sprain, sequela    4. Chronic shoulder pain, unspecified laterality      Plan:  The patient experiences neck pain with abnormal head position and has been diagnosed with cervical dystonia. The patient admits to the use of sensory tricks/gestes antagonistes. Upon repetitive alternating movement of the upper extremities, the head position worsens. The prior treatment has included: PT, anti-spasticity medications with no improvement in symptoms.      Goals of Botox treatment for cervical dystonia were discussed and include improved head position and reduced neck pain.       PA for Botox cervical dystonia using EMG guidance to maximize toxin effect, minimize risk to nearby neurovascular structures and minimize distant spread of toxin. The following muscles will be injected:   Bilateral   Splenius Capitis 15  units  (superior to levator in posterior triangle, mastiod/sup nuchal to the sp C7 to T4) = 30 units  Splenius Cervicis 20 units (C1-4 TP to ligamentum nuchae C7-T4)=40 units  Semispinalis Capitis 30 units (@hairline 2 FB lateral to midline) =60 units  Upper Trapezius 20 units (T pattern) = 40 units  Longissimus 30 units (2FB below lower palpable border of skull and 3 FB lateral to midline)= 60 units  Levator Scapulae 20 units (1FB superior from tip of scapula medially)= 40 units  Total = 280 units. Total waste 20 units     Informed Consent: The patient was educated about the  indications, risks, benefits and alternatives of chemodenervation with onabotulinum toxin A.   I explained the potential side effects including but not limited to: distant spread of toxin effect causing generalized muscle weakness, injection site pain,  musculoskeletal pain,  Myalgia, muscle spasms, hypertension, pneumothorax, hypersensitivity, anaphylaxis, dysphagia, dysphonia, dysarthria, urinany incontinence and breathing difficulty. The patient is aware that swallowing and breathing difficulties can be life threatening and there have been reports of death in some cases. We also discussed the expected benefit which includes reduction in muscle spasticity to allow improved function of the extremity and the anticipated duration of effect of 3 months. We discussed it may take a few weeks to start to see effect and the maximum effect will be in about 6 weeks. An opportunity to ask questions was given to the patient and questions were answered. The patient agreed to proceed with the procedure. Continued follow up with pain management. Controlled Substance Monitoring:  Acute and Chronic Pain Monitoring:   RX Monitoring 8/30/2021   Periodic Controlled Substance Monitoring No signs of potential drug abuse or diversion identified. Orders Placed This Encounter   Medications    pregabalin (LYRICA) 100 MG capsule     Sig: Take 2 capsules by mouth 2 times daily for 30 days. Dispense:  60 capsule     Refill:  2     Continued follow up with psychology and psychiatry. Continue home exercise program.   The patient was educated about the diagnosis, prognosis, indications, risks and benefits of treatment. An opportunity to ask questions was given to the patient and questions were answered. The patient agreed to proceed with the recommended treatment as described above. Follow up 3 months. Thank you for allowing me to participate in the care of your patient. Brittany Mchugh D.O., P.T.   Board Certified Physical Medicine and Rehabilitation  Board Certified Electrodiagnostic Medicine

## 2021-09-01 ENCOUNTER — OFFICE VISIT (OUTPATIENT)
Dept: PAIN MANAGEMENT | Age: 56
End: 2021-09-01
Payer: COMMERCIAL

## 2021-09-01 VITALS
BODY MASS INDEX: 34.21 KG/M2 | HEART RATE: 67 BPM | SYSTOLIC BLOOD PRESSURE: 122 MMHG | TEMPERATURE: 96.9 F | OXYGEN SATURATION: 97 % | DIASTOLIC BLOOD PRESSURE: 80 MMHG | HEIGHT: 69 IN | WEIGHT: 231 LBS | RESPIRATION RATE: 16 BRPM

## 2021-09-01 DIAGNOSIS — G89.29 CHRONIC SHOULDER PAIN, UNSPECIFIED LATERALITY: ICD-10-CM

## 2021-09-01 DIAGNOSIS — S13.9XXS NECK SPRAIN, SEQUELA: ICD-10-CM

## 2021-09-01 DIAGNOSIS — M54.12 C6 RADICULOPATHY: ICD-10-CM

## 2021-09-01 DIAGNOSIS — M54.12 CERVICAL RADICULITIS: Primary | ICD-10-CM

## 2021-09-01 DIAGNOSIS — M25.519 CHRONIC SHOULDER PAIN, UNSPECIFIED LATERALITY: ICD-10-CM

## 2021-09-01 DIAGNOSIS — M50.221 HERNIATED NUCLEUS PULPOSUS, C4-5: ICD-10-CM

## 2021-09-01 DIAGNOSIS — M17.0 PRIMARY OSTEOARTHRITIS OF BOTH KNEES: ICD-10-CM

## 2021-09-01 DIAGNOSIS — G89.4 CHRONIC PAIN SYNDROME: ICD-10-CM

## 2021-09-01 PROCEDURE — 99213 OFFICE O/P EST LOW 20 MIN: CPT | Performed by: PAIN MEDICINE

## 2021-09-01 NOTE — PROGRESS NOTES
Do you currently have any of the following:    Fever: No  Headache:  No  Cough: No  Shortness of breath: No  Exposed to anyone with these symptoms: No                                                                                                                Burnis Session presents to the [facility] on 9/1/2021. Kayden Christine is complaining of pain in his neck. . The pain is intermittent. The pain is described as aching. Pain is rated on his best day at a 3, on his worst day at a 9, and on average at a 5 on the VAS scale. He took his last dose of cymbalta, tizanidine and diclofenac . Kayden Christine does not have issues with constipation. Any procedures since your last visit: Yes, with 98 % relief, he is able to sleep, only feels a slight muscle spasm occasionally. Amanda Flores has numbness in right arm. He is  on NSAIDS and  is not on anticoagulation medications to include none and is managed by NA. Pacemaker or defibrillator: No Physician managing device is NA. Medication Contract and Consent for Opioid Use Documents Filed     Patient Documents       Type of Document Status Date Received Received By Description     Medication Contract Received 9/30/2019  9:25 AM Constance Bledsoe Medication Agreement Lyrica 9-25-19                   /80   Pulse 67   Temp 96.9 °F (36.1 °C) (Infrared)   Resp 16   Ht 5' 9\" (1.753 m)   Wt 231 lb (104.8 kg)   SpO2 97%   BMI 34.11 kg/m²      No LMP for male patient.

## 2021-09-01 NOTE — PROGRESS NOTES
[address] [facility], [address] [place] [address] Paul  [phone]    Follow up Note      Danuta Lutz     Date of Visit:  9/1/2021    CC:  Patient presents for follow up   Chief Complaint   Patient presents with    Follow Up After Procedure      Fluoroscopic guided cervical epidural steroid injection, #2 at C6-7 level. HPI:  Follow up on his neck pain which had improved since last office visit. Change in quality of symptoms:none  Medication side effects:not applicable . Recent diagnostic testing:none  Recent interventional procedures:JORGE excellent relief, Left knee injection good    He has not been on anticoagulation medications to include none. The patient  has not been on herbal supplements. The patient is not diabetic. Imaging:   Cervical spine MRI 12/2017      1. Straightening of the cervical spine with loss of normal lordosis. 2. No compression fracture or spondylolisthesis. 3. Multilevel mild to moderate degenerative disc disease of the   cervical spine as detailed above. 4. Mild to moderate facet joint arthropathy at left C2-C3.      Left knee Xray 2012  Arthritis    Left knee Xray 2021:  Mild-to-moderate degenerative findings, most notably involving the   patellofemoral joint. Thoracic spine Xray   Normal    Previous treatments: Physical Therapy, Epidural Steroid Injection 2016, per patient it had helped and medications. .      Urine screen:   None no opioids started     OARRS report   09/2021 consistent     Opioid agreement:  Date started:N/A  Renewal date:N/A    Past Medical History:   Diagnosis Date    Asthma     due to exposure to chemical    Glaucoma     Neck pain      Past Surgical History:   Procedure Laterality Date    ANESTHESIA NERVE BLOCK Bilateral 01/14/2020    BILATERAL CERVICAL MEDIAL BRANCH NERVE BLOCK UNDER FLUOROSCOPIC GUIDANCE AT C2,C3 AND C4 WITHOUT SEDATION (CPT 14597,93640) performed by Pamela Mccarthy MD at [place] [address] OR    APPENDECTOMY      CERVICAL FUSION  11/07/2018    EYE SURGERY Left     due to glaucoma    LAPAROSCOPIC APPENDECTOMY  05/23/2016    NERVE BLOCK Bilateral 01/14/2020    cervical medial branch nerve block    NERVE BLOCK Bilateral 12/07/2020    cervical medial branch facet block     NERVE BLOCK Bilateral 12/07/2020    BILATERAL CERVICAL MEDIAL BRANCH FACET BLOCK C2,C3,C4 WITHOUT SEDATION (CPT 51449) performed by Felix Brooks MD at [address] 8/23/2021    CERVICAL EPIDURAL STEROID INJECTION C6-7 RIGHT PARAMEDIAN WITH 80 DEPO performed by Felix Brooks MD at [address] Bilateral    [place] Bilateral 1998 r 2007 left     Prior to Admission medications    Medication Sig Start Date End Date Taking? Authorizing Provider   pregabalin (LYRICA) 100 MG capsule Take 2 capsules by mouth 2 times daily for 30 days.  8/30/21 9/29/21 Yes Keri Louis DO   tiZANidine (ZANAFLEX) 4 MG tablet Take 1 tablet by mouth 2 times daily as needed (muscle spasms) 7/21/21  Yes Mis Garsia MD   colchicine (COLCRYS) 0.6 MG tablet Take 1 tablet by mouth 2 times daily 7/12/21  Yes Lynda Leiva, APRN - CNP   simvastatin (ZOCOR) 20 MG tablet Take 1 tablet by mouth nightly 4/21/21  Yes Mis Garsia MD   Multiple Vitamins-Minerals (CENTRUM SPECIALIST HEART) TABS Take 1 tablet by mouth 2 times daily 8/12/20  Yes Mis Garsia MD   DULoxetine (CYMBALTA) 60 MG extended release capsule Take 60 mg by mouth daily  5/18/20  Yes Historical Provider, MD   DULoxetine (CYMBALTA) 30 MG extended release capsule Take 30 mg by mouth daily  5/18/20  Yes Historical Provider, MD   diclofenac (VOLTAREN) 75 MG EC tablet Take 1 tablet by mouth 2 times daily 6/11/20  Yes Mayank Felder MD   BREO ELLIPTA 200-25 MCG/INH AEPB inhaler  2/27/20  Yes Historical Provider, MD   dorzolamide (TRUSOPT) 2 % ophthalmic solution 2 drops 3 times daily   Yes Historical Provider, MD   brimonidine (ALPHAGAN) 0.2 % ophthalmic solution INSTILL ONE DROP TWO TIMES EVERY DAY INTO BOTH EYES. 6/18/19  Yes Historical Provider, MD   doxepin (SINEQUAN) 50 MG capsule 50 mg as needed  9/25/17  Yes Historical Provider, MD   latanoprost (XALATAN) 0.005 % ophthalmic solution Place 1 drop into both eyes nightly    Yes Historical Provider, MD   albuterol sulfate HFA (PROAIR HFA) 108 (90 Base) MCG/ACT inhaler Inhale 2 puffs into the lungs every 4 hours as needed for Wheezing or Shortness of Breath 10/25/19 8/20/21  Jacklyn Carbone MD     Allergies   Allergen Reactions    Lipitor Anaphylaxis    Sulfa Antibiotics Anaphylaxis     Social History     Socioeconomic History    Marital status:      Spouse name: Not on file    Number of children: Not on file    Years of education: Not on file    Highest education level: Not on file   Occupational History    Not on file   Tobacco Use    Smoking status: Never Smoker    Smokeless tobacco: Never Used   Vaping Use    Vaping Use: Never used   Substance and Sexual Activity    Alcohol use: Yes     Comment: rare wine    Drug use: No    Sexual activity: Yes   Other Topics Concern    Not on file   Social History Narrative    Not on file     Social Determinants of Health     Financial Resource Strain: Low Risk     Difficulty of Paying Living Expenses: Not hard at all   Food Insecurity: No Food Insecurity    Worried About Running Out of Food in the Last Year: Never true    Cornel of Food in the Last Year: Never true   Transportation Needs:     Lack of Transportation (Medical):      Lack of Transportation (Non-Medical):    Physical Activity:     Days of Exercise per Week:     Minutes of Exercise per Session:    Stress: No Stress Concern Present    Feeling of Stress : Not at all   Social Connections:     Frequency of Communication with Friends and Family:     Frequency of Social Gatherings with Friends and Family:     Attends Mandaeism Services:     Active Member of Clubs or Organizations:     Attends Club or Organization Meetings:     Marital Status:    Intimate Partner Violence:     Fear of Current or Ex-Partner:     Emotionally Abused:     Physically Abused:     Sexually Abused:      Family History   Problem Relation Age of Onset    Asthma Mother     High Blood Pressure Mother     Cancer Father         Lung    High Blood Pressure Father     Glaucoma Sister     Glaucoma Brother     Other Brother         Sarcoidosis of Lungs    Diabetes Paternal Grandmother     Heart Attack Paternal Grandfather     Drug Abuse Brother      REVIEW OF SYSTEMS:     Kayden Christine denies fever/chills, chest pain, shortness of breath, new bowel or bladder complaints. All other review of systems was negative. PHYSICAL EXAMINATION:      /80   Pulse 67   Temp 96.9 °F (36.1 °C) (Infrared)   Resp 16   Ht 5' 9\" (1.753 m)   Wt 231 lb (104.8 kg)   SpO2 97%   BMI 34.11 kg/m²     General:       General appearance: awake, alert, oriented   pleasant and well-hydrated. , in mild discomfort and A & O x3  Build:Normal Weight  Function:Rises from a seated position easily.     HEENT:     Head:normocephalic and atraumatic  Sclera: icterus absent,      Lungs:     Breathing:Breathing Pattern: regular, no distress     Abdomen:     Shape:non-distended and normal  Scars:yes  Tenderness:none     Cervical spine:     Inspection:anterior fusion scar  Palpation:mild facet tenderness bilaterally. Range of motion:Limited and painful ROM bilaterally. .     Musculoskeletal:     Trigger points in Paraveteral:present bilaterally  Spurling's:negative right, negative left     Extremities:     Tremors:None bilaterally upper and lower  Range of motion:Generally normal shoulders  Intact:Yes  Edema:Normal     Neurological:     Sensory:normal to joint position sense and light touch   Motor:   Right Grip5/5              Left Grip5/5               Right Bicep5/5 injection. SURGEON:   ANITA Maria M.D. ANESTHESIA: Local    ESTIMATED BLOOD LOSS: None. BRIEF HISTORY: Margaret Ruiz comes in today for Left  knee injection. . After discussing the potential risks and benefits of the procedure with the patient. Emre Hinds did request that we proceed. A complete History & Physical was reviewed and it is unchanged. DESCRIPTION OF PROCEDURE:   The patient was placed in a seated position. The area of the Left knee was prepped with chloraprep and draped in a sterile manner. The overlying skin and subcutaneous tissues were anesthetized with 0.5% Lidocaine. Then the targeted area of the patellar tendon was palpated and marked. A 25 gauge 1 1/2 inch needle was advanced into the joint capsule. Confirmation of needle placement was obtained by direct visualization of synovial fluid by aspiration. A solution of 0.25 % marcaine 3 cc and 80 mg DepoMedrol was injected easily without complications. The needle was then removed and Band-Aid applied. Disposition the patient tolerated the procedure well and there were no complications . Emre Hinds will follow up in our [facility] as scheduled. He was encouraged to call with questions, concerns or if worsening of symptoms occurs.     Ismael Argueta MD

## 2021-09-13 ENCOUNTER — TELEPHONE (OUTPATIENT)
Dept: PHYSICAL MEDICINE AND REHAB | Age: 56
End: 2021-09-13

## 2021-09-13 NOTE — TELEPHONE ENCOUNTER
Called and spoke with Yonny Mcdonald from [facility] to do prior authorization for Botox 300 units no prior authorization required he has a $4000 deductible which has been met will pay 80% out of pocket deductible is $8288 has $4,212.20 reference # [id number]. Will contact [facility] to start prior authorization though the specialty pharmacy.

## 2021-09-13 NOTE — TELEPHONE ENCOUNTER
Called and spoke with Jerilyn Kirwin to start prior authorization for Botox 300 units approved # [id number] Valid 9-13-21 through 12-13-21. Left message on [facility] to call in order for Botox 300 units. Will wait for return call from [facility].

## 2021-09-16 NOTE — TELEPHONE ENCOUNTER
Called and left message on patient voicemail that I received approval for his Botox through his specialty pharmacy [facility] get [facility] to ship once they call him to get his consent to ship he will need to call our office to let us know.

## 2021-09-16 NOTE — TELEPHONE ENCOUNTER
Called and spoke with Geno Silva pharmacist with [facility] to call in script for Botox 300 unit inject 300 units into neck area every 12 weeks # 3 and 2 refills.

## 2021-10-12 NOTE — TELEPHONE ENCOUNTER
Called and spoke with the patient to ask if he ever spoke with [facility] regarding his Botox. Patient stated they called and left him a message. I informed the patient that he needs to call them back so they can go over all the information and get set up for delivery. Patient was given hospitals phone number.

## 2021-10-21 NOTE — TELEPHONE ENCOUNTER
Called and spoke with Ida Jacob from [facility] specialty pharmacy to set up delivery for patient Botox. Ida Jacob stated that the patient informed her that he will call back next week to give consent to ship and pay co-pay. Will call back next week to set up delivery.

## 2021-11-10 ENCOUNTER — OFFICE VISIT (OUTPATIENT)
Dept: PAIN MANAGEMENT | Age: 56
End: 2021-11-10
Payer: COMMERCIAL

## 2021-11-10 VITALS
TEMPERATURE: 97.5 F | DIASTOLIC BLOOD PRESSURE: 70 MMHG | BODY MASS INDEX: 32.88 KG/M2 | RESPIRATION RATE: 20 BRPM | OXYGEN SATURATION: 96 % | HEART RATE: 65 BPM | SYSTOLIC BLOOD PRESSURE: 126 MMHG | WEIGHT: 222 LBS | HEIGHT: 69 IN

## 2021-11-10 DIAGNOSIS — S13.9XXS NECK SPRAIN, SEQUELA: Primary | ICD-10-CM

## 2021-11-10 DIAGNOSIS — G89.29 CHRONIC SHOULDER PAIN, UNSPECIFIED LATERALITY: ICD-10-CM

## 2021-11-10 DIAGNOSIS — M17.0 PRIMARY OSTEOARTHRITIS OF BOTH KNEES: ICD-10-CM

## 2021-11-10 DIAGNOSIS — M25.519 CHRONIC SHOULDER PAIN, UNSPECIFIED LATERALITY: ICD-10-CM

## 2021-11-10 DIAGNOSIS — M50.221 HERNIATED NUCLEUS PULPOSUS, C4-5: ICD-10-CM

## 2021-11-10 DIAGNOSIS — G89.4 CHRONIC PAIN SYNDROME: ICD-10-CM

## 2021-11-10 DIAGNOSIS — M54.12 C6 RADICULOPATHY: ICD-10-CM

## 2021-11-10 DIAGNOSIS — M54.12 CERVICAL RADICULITIS: ICD-10-CM

## 2021-11-10 PROCEDURE — 99213 OFFICE O/P EST LOW 20 MIN: CPT | Performed by: PAIN MEDICINE

## 2021-11-10 NOTE — PROGRESS NOTES
Do you currently have any of the following:    Fever: No  Headache:  No  Cough: No  Shortness of breath: No  Exposed to anyone with these symptoms: Miesha                                                                                                                Luis Harrison presents to the [facility] on 11/10/2021. Bob Shipley is complaining of pain in his neck which radiates to bilateral arms to fingers and to ears left is greater. The pain is intermittent. The pain is described as aching. Pain is rated on his best day at a 2, on his worst day at a 5, and on average at a 3 on the VAS scale. He took his last dose of tizandine this am, diclofenac this am. Betaddisonjo Urena yesterday . Bob Shipley does not have issues with constipation. Any procedures since your last visit: No,     He is not on NSAIDS and  is not on anticoagulation medications to include none   Pacemaker or defibrillator: No     Medication Contract and Consent for Opioid Use Documents Filed     Patient Documents     Type of Document Status Date Received Received By Description    Medication Contract Received 9/30/2019  9:25 AM [employer] Medication Agreement Lyrica 9-25-19                   /70   Pulse 65   Temp 97.5 °F (36.4 °C)   Resp 20   Ht 5' 9\" (1.753 m)   Wt 222 lb (100.7 kg)   SpO2 96%   BMI 32.78 kg/m²      No LMP for male patient.

## 2021-11-10 NOTE — PROGRESS NOTES
[address] [facility], [address] [place] [address] Paul  [phone]    Follow up Note      Colonel Guerra     Date of Visit:  11/10/2021    CC:  Patient presents for follow up   Chief Complaint   Patient presents with    Follow-up    Neck Pain     radiates to bilateral arms to fingers left greater     HPI:  Follow up on his neck pain with no acute issues. Change in quality of symptoms:none  Medication side effects:not applicable . Recent diagnostic testing:none  Recent interventional procedures:Left knee injection good    He has not been on anticoagulation medications to include none. The patient  has not been on herbal supplements. The patient is not diabetic. Imaging:   Cervical spine MRI 12/2017      1. Straightening of the cervical spine with loss of normal lordosis. 2. No compression fracture or spondylolisthesis. 3. Multilevel mild to moderate degenerative disc disease of the   cervical spine as detailed above. 4. Mild to moderate facet joint arthropathy at left C2-C3.      Left knee Xray 2012  Arthritis    Left knee Xray 2021:  Mild-to-moderate degenerative findings, most notably involving the   patellofemoral joint. Thoracic spine Xray   Normal    Previous treatments: Physical Therapy, Epidural Steroid Injection 2016, per patient it had helped and medications. .      Urine screen:   None no opioids started     OARRS report   11/2021 consistent     Opioid agreement:  Date started:N/A  Renewal date:N/A    Past Medical History:   Diagnosis Date    Asthma     due to exposure to chemical    Glaucoma     Neck pain      Past Surgical History:   Procedure Laterality Date    ANESTHESIA NERVE BLOCK Bilateral 01/14/2020    BILATERAL CERVICAL MEDIAL BRANCH NERVE BLOCK UNDER FLUOROSCOPIC GUIDANCE AT [facility] [place] AND C4 WITHOUT SEDATION (CPT 00387,31414) performed by Catherine Abdullahi MD at [address] [place] [address]  11/07/2018    EYE SURGERY Left     due to glaucoma    LAPAROSCOPIC APPENDECTOMY  05/23/2016    NERVE BLOCK Bilateral 01/14/2020    cervical medial branch nerve block    NERVE BLOCK Bilateral 12/07/2020    cervical medial branch facet block     NERVE BLOCK Bilateral 12/07/2020    BILATERAL CERVICAL MEDIAL BRANCH FACET BLOCK C2,C3,C4 WITHOUT SEDATION (CPT 44934) performed by Ignacia Carlson MD at [address] Right 8/23/2021    CERVICAL EPIDURAL STEROID INJECTION C6-7 RIGHT PARAMEDIAN WITH 80 DEPO performed by Ignacia Carlson MD at [address] Bilateral    [place] Bilateral 1998 r 2007 left     Prior to Admission medications    Medication Sig Start Date End Date Taking? Authorizing Provider   pregabalin (LYRICA) 100 MG capsule Take 2 capsules by mouth 2 times daily for 30 days.  8/30/21 11/10/21 Yes Keri Louis DO   tiZANidine (ZANAFLEX) 4 MG tablet Take 1 tablet by mouth 2 times daily as needed (muscle spasms) 7/21/21  Yes Brianna Solis MD   colchicine (COLCRYS) 0.6 MG tablet Take 1 tablet by mouth 2 times daily 7/12/21  Yes Merline Salm, APRN - CNP   simvastatin (ZOCOR) 20 MG tablet Take 1 tablet by mouth nightly 4/21/21  Yes Brianna Solis MD   Multiple Vitamins-Minerals (CENTRUM SPECIALIST HEART) TABS Take 1 tablet by mouth 2 times daily 8/12/20  Yes Brianna Solis MD   DULoxetine (CYMBALTA) 60 MG extended release capsule Take 60 mg by mouth daily  5/18/20  Yes Historical Provider, MD   DULoxetine (CYMBALTA) 30 MG extended release capsule Take 30 mg by mouth daily  5/18/20  Yes Historical Provider, MD   diclofenac (VOLTAREN) 75 MG EC tablet Take 1 tablet by mouth 2 times daily 6/11/20  Yes MD MARTA Arroyo ELLIPTA 200-25 MCG/INH AEPB inhaler  2/27/20  Yes Historical Provider, MD   albuterol sulfate HFA (PROAIR HFA) 108 (90 Base) MCG/ACT inhaler Inhale 2 puffs into the lungs every 4 hours as needed for Wheezing or Shortness of Breath 10/25/19 11/10/21 Yes Ryder Chirinos MD   dorzolamide (TRUSOPT) 2 % ophthalmic solution 2 drops 3 times daily   Yes Historical Provider, MD   brimonidine (ALPHAGAN) 0.2 % ophthalmic solution INSTILL ONE DROP TWO TIMES EVERY DAY INTO BOTH EYES. 6/18/19  Yes Historical Provider, MD   doxepin (SINEQUAN) 50 MG capsule 50 mg as needed  9/25/17  Yes Historical Provider, MD   latanoprost (XALATAN) 0.005 % ophthalmic solution Place 1 drop into both eyes nightly    Yes Historical Provider, MD     Allergies   Allergen Reactions    Lipitor Anaphylaxis    Sulfa Antibiotics Anaphylaxis     Social History     Socioeconomic History    Marital status:      Spouse name: Not on file    Number of children: Not on file    Years of education: Not on file    Highest education level: Not on file   Occupational History    Not on file   Tobacco Use    Smoking status: Never Smoker    Smokeless tobacco: Never Used   Vaping Use    Vaping Use: Never used   Substance and Sexual Activity    Alcohol use: Yes     Comment: rare wine    Drug use: No    Sexual activity: Yes   Other Topics Concern    Not on file   Social History Narrative    Not on file     Social Determinants of Health     Financial Resource Strain: Low Risk     Difficulty of Paying Living Expenses: Not hard at all   Food Insecurity: No Food Insecurity    Worried About Running Out of Food in the Last Year: Never true    Cornel of Food in the Last Year: Never true   Transportation Needs:     Lack of Transportation (Medical): Not on file    Lack of Transportation (Non-Medical):  Not on file   Physical Activity:     Days of Exercise per Week: Not on file    Minutes of Exercise per Session: Not on file   Stress: No Stress Concern Present    Feeling of Stress : Not at all   Social Connections:     Frequency of Communication with Friends and Family: Not on file    Frequency of Social Gatherings with Friends and Family: Not on file    Attends Shinto Services: Not on file    Active Member of Clubs or Organizations: Not on file    Attends Club or Organization Meetings: Not on file    Marital Status: Not on file   Intimate Partner Violence:     Fear of Current or Ex-Partner: Not on file    Emotionally Abused: Not on file    Physically Abused: Not on file    Sexually Abused: Not on file   Housing Stability:     Unable to Pay for Housing in the Last Year: Not on file    Number of Places Lived in the Last Year: Not on file    Unstable Housing in the Last Year: Not on file     Family History   Problem Relation Age of Onset    Asthma Mother     High Blood Pressure Mother     Cancer Father         Lung    High Blood Pressure Father     Glaucoma Sister     Glaucoma Brother     Other Brother         Sarcoidosis of Lungs    Diabetes Paternal Grandmother     Heart Attack Paternal Grandfather     Drug Abuse Brother      REVIEW OF SYSTEMS:     Simone Jose denies fever/chills, chest pain, shortness of breath, new bowel or bladder complaints. All other review of systems was negative. PHYSICAL EXAMINATION:      /70   Pulse 65   Temp 97.5 °F (36.4 °C)   Resp 20   Ht 5' 9\" (1.753 m)   Wt 222 lb (100.7 kg)   SpO2 96%   BMI 32.78 kg/m²     General:       General appearance: awake, alert, oriented   pleasant and well-hydrated. , in mild discomfort and A & O x3  Build:Normal Weight  Function:Rises from a seated position easily.     HEENT:     Head:normocephalic and atraumatic  Sclera: icterus absent,      Lungs:     Breathing:Breathing Pattern: regular, no distress     Abdomen:     Shape:non-distended and normal  Scars:yes  Tenderness:none     Cervical spine:     Inspection:anterior fusion scar  Palpation:mild facet tenderness bilaterally. Range of motion:Limited and painful ROM bilaterally. .     Musculoskeletal:     Trigger points in Paraveteral:present bilaterally  Spurling's:negative right, negative left     Extremities:     Tremors:None bilaterally upper and lower  Range of motion:Generally normal shoulders  Intact:Yes  Edema:Normal     Neurological:     Sensory:normal to joint position sense and light touch   Motor:   Right Grip5/5              Left Grip5/5               Right Bicep5/5           Left Bicep5/5              Right Triceps5/5       Left Triceps5/5          Right Deltoid5/5     Left Deltoid5/5                  Reflexes:    Right Brachioradialis reflex2+  Left Brachioradialis reflex2+  Right Biceps reflex2+  Left Biceps reflex2+  Right Triceps reflex2+  Left Triceps reflex2+  Gait:normal     Dermatology:     Skin:no unusual rashes and no skin lesions     Assessment/Plan:   Chronic neck pain with radiation to the Left upper extremity that started after a work related injury in 2016   Patient had seen Leana Masterson but he didn't recommend surgery   Patient had seen  at [facility] clinic and had underwent C4-5/C5-6 anterior fusion 11/2018. Good outcome with cervical facet MBB 05/2021  Plan:  Follow up on his neck and Left knee pain with no acute issues. Good outcome with Left knee injection, will repeat as needed. Currently seeing Rene Saucedo and is on Lyrica/Voltaren/Cymbalta/Flexeril. OARRS report reviewed 11/2021. Patient encouraged to stay active. Treatment plan discussed with the patient. We discussed with the patient that combining opioids, benzodiazepines, alcohol, illicit drugs or sleep aids increases the risk of respiratory depression including death. We discussed that these medications may cause drowsiness, sedation or dizziness and have counseled the patient not to drive or operate machinery. We have discussed that these medications will be prescribed only by one provider. We have discussed with the patient about age related risk factors and have thoroughly discussed the importance of taking these medications as prescribed. The patient verbalizes understanding.     ccreferring physic    ANITA Fernandez M.D.      11/10/2021    Patient: Pedro Meza  :  1965  Age:  55 y.o. Sex:  male     PRE-OPERATIVE DIAGNOSIS: Left  Knee pain, osteoarthitis. POST-OPERATIVE DIAGNOSIS: Same. PROCEDURE PERFORMED:  Left knee injection. SURGEON:   ANITA Fernandez M.D. ANESTHESIA: Local    ESTIMATED BLOOD LOSS: None. BRIEF HISTORY: Pedro Meza comes in today for Left  knee injection. . After discussing the potential risks and benefits of the procedure with the patient. Servando Norbert did request that we proceed. A complete History & Physical was reviewed and it is unchanged. DESCRIPTION OF PROCEDURE:   The patient was placed in a seated position. The area of the Left knee was prepped with chloraprep and draped in a sterile manner. The overlying skin and subcutaneous tissues were anesthetized with 0.5% Lidocaine. Then the targeted area of the patellar tendon was palpated and marked. A 25 gauge 1 1/2 inch needle was advanced into the joint capsule. Confirmation of needle placement was obtained by direct visualization of synovial fluid by aspiration. A solution of 0.25 % marcaine 3 cc and 80 mg DepoMedrol was injected easily without complications. The needle was then removed and Band-Aid applied. Disposition the patient tolerated the procedure well and there were no complications . Servando Toussaint will follow up in our [facility] as scheduled. He was encouraged to call with questions, concerns or if worsening of symptoms occurs.     César Vides MD

## 2021-12-03 NOTE — TELEPHONE ENCOUNTER
Called and spoke with Alayna Roberson from [address] [place] [address] to set up delivery for Botox 300 units. Delivery date set for 12-7-21.

## 2021-12-08 NOTE — TELEPHONE ENCOUNTER
Spoke with Stephon from [address] [place] [address] to find out why we have not received patient Botox. Stpehon stated that they have not yet received the patient co-pay or consent to ship.

## 2021-12-09 NOTE — TELEPHONE ENCOUNTER
Called and spoke with the patient and he stated that he wanted to hold off on doing Botox he is trying the dry needling first and will call us when he is ready.

## 2021-12-13 ENCOUNTER — OFFICE VISIT (OUTPATIENT)
Dept: PHYSICAL MEDICINE AND REHAB | Age: 56
End: 2021-12-13
Payer: COMMERCIAL

## 2021-12-13 VITALS
WEIGHT: 230 LBS | HEIGHT: 69 IN | DIASTOLIC BLOOD PRESSURE: 82 MMHG | SYSTOLIC BLOOD PRESSURE: 148 MMHG | HEART RATE: 80 BPM | BODY MASS INDEX: 34.07 KG/M2

## 2021-12-13 DIAGNOSIS — S13.9XXS NECK SPRAIN, SEQUELA: ICD-10-CM

## 2021-12-13 DIAGNOSIS — G89.29 CHRONIC SHOULDER PAIN, UNSPECIFIED LATERALITY: ICD-10-CM

## 2021-12-13 DIAGNOSIS — M50.221 HERNIATED NUCLEUS PULPOSUS, C4-5: Primary | ICD-10-CM

## 2021-12-13 DIAGNOSIS — M25.519 CHRONIC SHOULDER PAIN, UNSPECIFIED LATERALITY: ICD-10-CM

## 2021-12-13 DIAGNOSIS — M54.12 C6 RADICULOPATHY: ICD-10-CM

## 2021-12-13 PROCEDURE — 99214 OFFICE O/P EST MOD 30 MIN: CPT | Performed by: PHYSICAL MEDICINE & REHABILITATION

## 2021-12-13 RX ORDER — PREGABALIN 225 MG/1
225 CAPSULE ORAL 2 TIMES DAILY
Qty: 60 CAPSULE | Refills: 2 | Status: SHIPPED | OUTPATIENT
Start: 2021-12-13 | End: 2021-12-13 | Stop reason: SDUPTHER

## 2021-12-13 RX ORDER — PREGABALIN 225 MG/1
225 CAPSULE ORAL 2 TIMES DAILY
Qty: 60 CAPSULE | Refills: 2 | Status: SHIPPED
Start: 2021-12-13 | End: 2022-04-05 | Stop reason: SDUPTHER

## 2021-12-13 RX ORDER — HYDROCODONE BITARTRATE AND ACETAMINOPHEN 5; 325 MG/1; MG/1
1 TABLET ORAL EVERY 6 HOURS PRN
Qty: 28 TABLET | Refills: 0 | Status: SHIPPED | OUTPATIENT
Start: 2021-12-13 | End: 2021-12-20

## 2021-12-13 NOTE — PROGRESS NOTES
Annabelle Whitley D.O., P.T. [facility] Physical Medicine and Rehabilitation  [address] [facility], [address] , [place]  Phone: [phone]  Fax: [phone]      12/13/2021    [address] [place] [address] Claim #: [id number]   [address] [place] [address] DOI: 4/19/16   [address] [place] [address] POR:Dr. Eliana Scott  [facility] ATTY: Rene Landon, [address], & Gopi Thorne, LPA. [facility] Alllowed conditions:  S00.379T CONTUSION OF LEFT SHOULDER LEFT, S60.221A CONTUSION OF RIGHT HAND RIGHT,  Y17.389S UNSPECIFIED SPRAIN OF LEFT SHOULDER JOINT LEFT, H60.522 ACUTE CHEMICAL OTITIS EXTERNA, LEFT EAR, S13. 4XXA SPRAIN OF LIGAMENTS OF CERVICAL SPINE, S46.012A FULL THICKNESS ROTATOR CUFF TEAR SHOULDER  LEFT, S46.112A RUPTURED LONG HEAD BICEP TENDON SHOULDER  LEFT, M19.012 SUB AGG ACROMIOCLAVICULAR ARTHROSIS SHOULDER  LEFT, M54.12 RADICULOPATHY, CERVICAL REGION  C6.    [facility] Dismissed Conditions:  M18.11 ARTHROSIS OF THE FIRST METACARPOPHALANGEAL JOINT HAND  RIGHT, M65.841 TENOSYNOVITIS HAND  RIGHT, S62.614S DISP FX OF PROXIMAL PHALANX OF RIGHT RING FINGER, SEQUELA RIGHT, S46.012A PARTIAL THICKNESS ROTATOR CUFF TEAR SHOULDER  LEFT. Chief Complaint   Patient presents with    Neck Pain     workers comp follow up   HPI:  Since the last visit the patient started chiropractic treatment and has been receiving dry needling which has been giving some relief. He continues to have neck pain and stiffness. He had a hearing last week for additional allowances. Today, he has pain in his neck and radiating to his right bicep, chest wall, medial forearm,thumb and index fingers and is described as burning, aching. Pain is currently rated Pain Score:   4. There is associated cervical crepitus, stiffness. The neck pain is worse with movement, better with Flexeril, Cymbalta, Lyrica, Diclofenac. There is associated weakness, paresthesias.      Past Medical History:   Diagnosis Date    Asthma     due to exposure to chemical    Glaucoma     Neck pain      The injured worker denies any prior injury to the same body area injured in the claim. Past Surgical History:   Procedure Laterality Date    ANESTHESIA NERVE BLOCK Bilateral 01/14/2020    BILATERAL CERVICAL MEDIAL BRANCH NERVE BLOCK UNDER FLUOROSCOPIC GUIDANCE AT C2,C3 AND C4 WITHOUT SEDATION (CPT 49325,84683) performed by Theodore Castillo MD at [address] [facility]  11/07/2018    EYE SURGERY Left     due to glaucoma    LAPAROSCOPIC APPENDECTOMY  05/23/2016    NERVE BLOCK Bilateral 01/14/2020    cervical medial branch nerve block    NERVE BLOCK Bilateral 12/07/2020    cervical medial branch facet block     NERVE BLOCK Bilateral 12/07/2020    BILATERAL CERVICAL MEDIAL BRANCH FACET BLOCK C2,C3,C4 WITHOUT SEDATION (CPT 48056) performed by Theodore Castillo MD at [address] Right 8/23/2021    CERVICAL EPIDURAL STEROID INJECTION C6-7 RIGHT PARAMEDIAN WITH 80 DEPO performed by Theodore Castillo MD at [address] [facility] Bilateral    [place] Bilateral 1998 r 2007 left     The injured worker denies any prior surgeries to the same body area injured in the claim.      Social History     Social History    Marital status:      Social History Main Topics    Smoking status: Never Smoker    Smokeless tobacco: Never Used    Alcohol use Yes      Comment: rarely    Drug use: No    Sexual activity: Not on file     Family History   Problem Relation Age of Onset    Asthma Mother     High Blood Pressure Mother     Cancer Father         Lung    High Blood Pressure Father     Glaucoma Sister     Glaucoma Brother     Other Brother         Sarcoidosis of Lungs    Diabetes Paternal Grandmother     Heart Attack Paternal Grandfather     Drug Abuse Brother        Allergies   Allergen Reactions    Lipitor Anaphylaxis    Sulfa Antibiotics Anaphylaxis       Current Outpatient Medications   Medication Sig Dispense Refill    pregabalin (LYRICA) 225 MG capsule Take 1 capsule by mouth 2 times daily for 30 days. 60 capsule 2    HYDROcodone-acetaminophen (NORCO) 5-325 MG per tablet Take 1 tablet by mouth every 6 hours as needed for Pain for up to 7 days. Intended supply: 7 days. Take lowest dose possible to manage pain 28 tablet 0    tiZANidine (ZANAFLEX) 4 MG tablet Take 1 tablet by mouth 2 times daily as needed (muscle spasms) 60 tablet 2    colchicine (COLCRYS) 0.6 MG tablet Take 1 tablet by mouth 2 times daily 20 tablet 0    simvastatin (ZOCOR) 20 MG tablet Take 1 tablet by mouth nightly 30 tablet 5    Multiple Vitamins-Minerals (CENTRUM SPECIALIST HEART) TABS Take 1 tablet by mouth 2 times daily      DULoxetine (CYMBALTA) 60 MG extended release capsule Take 60 mg by mouth daily       DULoxetine (CYMBALTA) 30 MG extended release capsule Take 30 mg by mouth daily       diclofenac (VOLTAREN) 75 MG EC tablet Take 1 tablet by mouth 2 times daily 60 tablet 3    BREO ELLIPTA 200-25 MCG/INH AEPB inhaler       dorzolamide (TRUSOPT) 2 % ophthalmic solution 2 drops 3 times daily      brimonidine (ALPHAGAN) 0.2 % ophthalmic solution INSTILL ONE DROP TWO TIMES EVERY DAY INTO BOTH EYES.  3    doxepin (SINEQUAN) 50 MG capsule 50 mg as needed       latanoprost (XALATAN) 0.005 % ophthalmic solution Place 1 drop into both eyes nightly       albuterol sulfate HFA (PROAIR HFA) 108 (90 Base) MCG/ACT inhaler Inhale 2 puffs into the lungs every 4 hours as needed for Wheezing or Shortness of Breath 1 Inhaler 6     No current facility-administered medications for this visit. ROS: No new weakness, paresthesia, incontinence of bowel or bladder, saddle anesthesia, falls or gait dysfunction. Physical Exam: Blood pressure (!) 148/82, pulse 80, height 5' 9\" (1.753 m), weight 230 lb (104.3 kg). General: The patient is in no apparent distress. Body habitus is non-obese. HEENT: No rhinorrhea, sneezing, yawning, or lacrimation. No scleral icterus or conjunctival injection.  SKIN: No piloerection. No track marks. No rash. Normal turgor. No erythema or ecchymosis. Psychological: Mood and affect are appropriate. Hygiene is appropriate. Cardiovascular:  Heart is regular rate and rhythm. Peripheral pulses are 2+ at the dorsalis pedis, posterior tibial and radial arteries. There is no edema. Respiratory: Respirations are regular and unlabored. There is no cyanosis. Lymphatic: There is no cervical or inguinal lymphadenopathy. Gastrointestinal: Soft abdomen, non-tender. No pulsating abdominal mass. Genitourinary: No costovertebral angle tenderness. MSK: Cervical: Cervical lordosis increased, head posture in retrocollis,  thoracic kyphosis normal and lumbar lordosis normal. No superficial or bony tenderness. Tender at bilateral cervical paraspinals and trapezius. No edema, erythema, ecchymosis, effusion, instability, mass or deformity. No midline bony tenderness. Supple cervical paraspinals and trapezius. Spurling is positive left. Cervical AROM flexion is 70*, extension is 30*, lateral flexion is 35* bilaterally, rotation is 70* right, 60* left. Shoulder: No edema, erythema, ecchymosis, effusion, instability, mass or deformity. Shoulder AROM is full. No painful arc. No crepitus. No tenderness to palpation at the acromioclavicular joint, subacromial space or biceps tendon insertion. Negative Esequiel-Koch, Scarf,  Drop arm, and Speeds. Neurologic: Awake, alert and oriented in three planes. Speech is fluent. No facial weakness. Tongue is midline. Hearing is intact for conversation. Pupils are equal and round. Extraocular muscles are intact. Hearing is intact for conversation. Shoulder shrug symmetric. Sensation: Intact for light touch and pin prick in all upper and lower extremity dermatomes. Vibration and proprioception are intact at the bilateral first MTP. Strength: 4/5 left shoulder abduction, otherwise, 5/5 in all myotomes in the upper and lower extremities.   Muscle Tendon Reflexes: 1+ symmetric in the bilateral upper and 2+ lower extremities. Babinski is downgoing bilaterally. Glenora Miyamoto is negative bilaterally. Gait is Normal.   Romberg is negative. Heel and toe walk are normal.  Tandem walk is normal.  No clonus or spasticity. The patient was able to rise from a chair and squat without difficulty. There is no tremor. Impression:   1. Herniated nucleus pulposus, C4-5    2. C6 radiculopathy    3. Neck sprain, sequela    4. Chronic shoulder pain, unspecified laterality      Plan:  Awaiting [facility] hearing results regarding additional allowances. Continued follow up with pain management. Controlled Substance Monitoring:  Acute and Chronic Pain Monitoring:   RX Monitoring 12/13/2021   Periodic Controlled Substance Monitoring No signs of potential drug abuse or diversion identified. Orders Placed This Encounter   Medications    pregabalin (LYRICA) 225 MG capsule     Sig: Take 1 capsule by mouth 2 times daily for 30 days. Dispense:  60 capsule     Refill:  2    HYDROcodone-acetaminophen (NORCO) 5-325 MG per tablet     Sig: Take 1 tablet by mouth every 6 hours as needed for Pain for up to 7 days. Intended supply: 7 days. Take lowest dose possible to manage pain     Dispense:  28 tablet     Refill:  0     Reduce doses taken as pain becomes manageable     Medications Discontinued During This Encounter   Medication Reason    pregabalin (LYRICA) 100 MG capsule DOSE ADJUSTMENT     Continue Cymbalta per psychiatry and Zanaflex per Dr. Jackie Galaviz. Continue chiropractic. Has seen improvement in cervical mobility and muscle tension since starting chiropractic. Continued follow up with psychology and psychiatry. Continue home exercise program.   The patient was educated about the diagnosis, prognosis, indications, risks and benefits of treatment. An opportunity to ask questions was given to the patient and questions were answered.   The patient agreed to proceed with the recommended treatment as described above. Follow up 3 months. Thank you for allowing me to participate in the care of your patient. Toro Coon D.O., P.T.   Board Certified Physical Medicine and Rehabilitation  Board Certified Electrodiagnostic Medicine

## 2021-12-18 RX ORDER — SIMVASTATIN 20 MG
TABLET ORAL
Qty: 30 TABLET | Refills: 2 | Status: SHIPPED
Start: 2021-12-18 | End: 2022-03-03 | Stop reason: SDUPTHER

## 2022-03-03 ENCOUNTER — OFFICE VISIT (OUTPATIENT)
Dept: FAMILY MEDICINE CLINIC | Age: 57
End: 2022-03-03
Payer: COMMERCIAL

## 2022-03-03 VITALS
HEART RATE: 68 BPM | BODY MASS INDEX: 33.47 KG/M2 | DIASTOLIC BLOOD PRESSURE: 78 MMHG | SYSTOLIC BLOOD PRESSURE: 132 MMHG | OXYGEN SATURATION: 98 % | WEIGHT: 226 LBS | HEIGHT: 69 IN | RESPIRATION RATE: 20 BRPM

## 2022-03-03 DIAGNOSIS — E78.2 MIXED HYPERLIPIDEMIA: Primary | ICD-10-CM

## 2022-03-03 DIAGNOSIS — Z12.5 PROSTATE CANCER SCREENING: ICD-10-CM

## 2022-03-03 PROCEDURE — 99214 OFFICE O/P EST MOD 30 MIN: CPT | Performed by: FAMILY MEDICINE

## 2022-03-03 RX ORDER — SIMVASTATIN 20 MG
TABLET ORAL
Qty: 90 TABLET | Refills: 1 | Status: SHIPPED | OUTPATIENT
Start: 2022-03-03

## 2022-03-03 ASSESSMENT — ENCOUNTER SYMPTOMS
NAUSEA: 0
VOMITING: 0
SHORTNESS OF BREATH: 0
DIARRHEA: 0

## 2022-03-03 ASSESSMENT — PATIENT HEALTH QUESTIONNAIRE - PHQ9
SUM OF ALL RESPONSES TO PHQ QUESTIONS 1-9: 0
SUM OF ALL RESPONSES TO PHQ QUESTIONS 1-9: 0
SUM OF ALL RESPONSES TO PHQ9 QUESTIONS 1 & 2: 0
1. LITTLE INTEREST OR PLEASURE IN DOING THINGS: 0
SUM OF ALL RESPONSES TO PHQ QUESTIONS 1-9: 0
2. FEELING DOWN, DEPRESSED OR HOPELESS: 0
SUM OF ALL RESPONSES TO PHQ QUESTIONS 1-9: 0

## 2022-03-03 NOTE — PROGRESS NOTES
Chief Complaint   Patient presents with    Hyperlipidemia       Patient is here for follow up for hyperlipidemia    Hyperlipidemia:  Patient is here for follow up hyperlipidemia. This is  generally controlled on current medication regimen. BP today is 132/78. Takes meds as directed and tolerates them well. No symptoms from htn standpoint per ROS. Patientis  compliant with lifestyle modifications. Patient does not smoke. Patient states he saw the dermatologist last year. They took biopsy of his right thumb nail and clippings. He never got the results. Patient's past medical, surgical, social and/or family history reviewed, updated inchart, and are non-contributory (unless otherwise stated). Medications and allergies also reviewed and updated in chart.       Current Outpatient Medications   Medication Sig Dispense Refill    simvastatin (ZOCOR) 20 MG tablet TAKE ONE TABLET BY MOUTH NIGHTLY 90 tablet 1    tiZANidine (ZANAFLEX) 4 MG tablet Take 1 tablet by mouth 2 times daily as needed (muscle spasms) 60 tablet 2    colchicine (COLCRYS) 0.6 MG tablet Take 1 tablet by mouth 2 times daily 20 tablet 0    Multiple Vitamins-Minerals (CENTRUM SPECIALIST HEART) TABS Take 1 tablet by mouth 2 times daily      DULoxetine (CYMBALTA) 60 MG extended release capsule Take 60 mg by mouth daily       DULoxetine (CYMBALTA) 30 MG extended release capsule Take 30 mg by mouth daily       diclofenac (VOLTAREN) 75 MG EC tablet Take 1 tablet by mouth 2 times daily 60 tablet 3    BREO ELLIPTA 200-25 MCG/INH AEPB inhaler       dorzolamide (TRUSOPT) 2 % ophthalmic solution 2 drops 3 times daily      brimonidine (ALPHAGAN) 0.2 % ophthalmic solution INSTILL ONE DROP TWO TIMES EVERY DAY INTO BOTH EYES.  3    doxepin (SINEQUAN) 50 MG capsule 50 mg as needed       latanoprost (XALATAN) 0.005 % ophthalmic solution Place 1 drop into both eyes nightly       pregabalin (LYRICA) 225 MG capsule Take 1 capsule by mouth 2 times daily for 30 days. 60 capsule 2    albuterol sulfate HFA (PROAIR HFA) 108 (90 Base) MCG/ACT inhaler Inhale 2 puffs into the lungs every 4 hours as needed for Wheezing or Shortness of Breath 1 Inhaler 6     No current facility-administered medications for this visit. Wt Readings from Last 3 Encounters:   03/03/22 226 lb (102.5 kg)   12/13/21 230 lb (104.3 kg)   11/10/21 222 lb (100.7 kg)     /78   Pulse 68   Resp 20   Ht 5' 9\" (1.753 m)   Wt 226 lb (102.5 kg)   SpO2 98%   BMI 33.37 kg/m²     Review of Systems   Eyes: Negative for visual disturbance. Respiratory: Negative for shortness of breath. Cardiovascular: Negative for chest pain, palpitations and leg swelling. Gastrointestinal: Negative for diarrhea, nausea and vomiting. Genitourinary: Negative for difficulty urinating, dysuria and frequency. Skin: Negative for rash. +nail plate discoloration right thumbnail   Psychiatric/Behavioral: Negative for dysphoric mood. Physical Exam  Vitals reviewed. Constitutional:       General: He is not in acute distress. Appearance: He is well-developed. Neck:      Vascular: No carotid bruit. Cardiovascular:      Rate and Rhythm: Normal rate and regular rhythm. Heart sounds: Normal heart sounds. No murmur heard. No gallop. Pulmonary:      Effort: Pulmonary effort is normal.      Breath sounds: Normal breath sounds. No wheezing or rales. Abdominal:      General: Bowel sounds are normal. There is no distension. Palpations: Abdomen is soft. Tenderness: There is no abdominal tenderness. Musculoskeletal:      Cervical back: Neck supple. Right lower leg: No edema. Left lower leg: No edema. Skin:     General: Skin is warm and dry. Neurological:      Mental Status: He is alert and oriented to person, place, and time. Bettye Frankel was seen today for hyperlipidemia.     Diagnoses and all orders for this visit:    Mixed hyperlipidemia  - simvastatin (ZOCOR) 20 MG tablet; TAKE ONE TABLET BY MOUTH NIGHTLY  -     CBC; Future  -     Comprehensive Metabolic Panel; Future  -     Lipid Panel; Future  -     TSH; Future    Prostate cancer screening  -     PSA Screening; Future          BMI was elevated today, and weight loss plan recommended is : conventional weight loss. Phone/MyChart follow up iftests abnormal.    Return in about 6 months (around 9/3/2022) for physical., or sooner if necessary. Educational materials and/or home exercises printed for patient's review and wereincluded in patient instructions on his/her After Visit Summary and given to patient at the end of visit. Counseled regarding above diagnosis, including possible risks andcomplications,  especially if left uncontrolled. Counseled regarding the possible side effects, risks, benefits and alternatives to treatment; patient and/or guardian verbalizes understanding, agrees, feelscomfortable with and wishes to proceed with above treatment plan. Advised patient to call with any new medication issues, and read all Rx info from pharmacy to assure aware of all possible risks and side effects ofmedication before taking. Reviewed age and gender appropriate health screening exams and vaccinations. Advised patient regarding importance of keeping up with recommended health maintenance and to schedule as soonas possible if overdue, as this is important in assessing for undiagnosed pathology, especially cancer, as well as protecting against potentially harmful/life threatening disease. Patient and/or guardianverbalizes understanding and agrees with above counseling, assessment and plan. All questions answered.

## 2022-03-03 NOTE — PATIENT INSTRUCTIONS
Patient Education        Learning About High Cholesterol  What is high cholesterol? High cholesterol means that you have too much cholesterol in your blood. Cholesterol is a type of fat. It's needed for many body functions, such as making new cells. Cholesterol is made by your body. It also comes from food you eat. Having high cholesterol can lead to the buildup of plaque in artery walls. This can increase your risk of heart attack and stroke. When your doctor talks about high cholesterol levels, your doctor is talking about your total cholesterol and LDL cholesterol (the \"bad\" cholesterol) levels. Your doctor may also speak about HDL (the \"good\" cholesterol) levels. High HDL is linked with a lower risk for coronary artery disease, heart attack, and stroke. Your cholesterol levels help your doctor find out your risk for having a heart attack or stroke. How can you help prevent high cholesterol? A heart-healthy lifestyle can help you prevent high cholesterol and lower your risk for a heart attack and stroke. · Eat heart-healthy foods. ? Eat fruits, vegetables, whole grains, beans, and other high-fiber foods. ? Eat lean proteins, such as seafood, lean meats, beans, nuts, and soy products. ? Eat healthy fats, such as canola and olive oil. ? Choose foods that are low in saturated fat. ? Limit sodium and alcohol. ? Limit drinks and foods with added sugar. · Be active. Try to do moderate activity at least 2½ hours a week. Or try vigorous activity at least 1¼ hours a week. You may want to walk or try other activities, such as running, swimming, cycling, or playing tennis or team sports. · Stay at a healthy weight. Lose weight if you need to. · Don't smoke. If you need help quitting, talk to your doctor about stop-smoking programs and medicines. These can increase your chances of quitting for good. How is high cholesterol treated?   The goal of treatment is to reduce your chances of having a heart attack or stroke. The goal is not to lower your cholesterol numbers only. · Have a heart-healthy lifestyle. This includes eating healthy foods, not smoking, losing weight, and being more active. · You may choose to take medicine. Follow-up care is a key part of your treatment and safety. Be sure to make and go to all appointments, and call your doctor if you are having problems. It's also a good idea to know your test results and keep a list of the medicines you take. Where can you learn more? Go to [email or web address] and sign in to your [employer] account. Enter K967 in the OrderMotion box to learn more about \"Learning About High Cholesterol. \"     If you do not have an account, please click on the \"Sign Up Now\" link. Current as of: April 29, 2021               Content Version: 13.1  © 0426-2981 Healthwise, Incorporated. Care instructions adapted under license by [facility] ([facility]). If you have questions about a medical condition or this instruction, always ask your healthcare professional. [address] any warranty or liability for your use of this information.

## 2022-03-24 DIAGNOSIS — E78.2 MIXED HYPERLIPIDEMIA: ICD-10-CM

## 2022-03-24 DIAGNOSIS — Z12.5 PROSTATE CANCER SCREENING: ICD-10-CM

## 2022-03-24 LAB
ALBUMIN SERPL-MCNC: 4.7 G/DL (ref 3.5–5.2)
ALP BLD-CCNC: 67 U/L (ref 40–129)
ALT SERPL-CCNC: 48 U/L (ref 0–40)
ANION GAP SERPL CALCULATED.3IONS-SCNC: 14 MMOL/L (ref 7–16)
AST SERPL-CCNC: 33 U/L (ref 0–39)
BILIRUB SERPL-MCNC: 0.5 MG/DL (ref 0–1.2)
BUN BLDV-MCNC: 12 MG/DL (ref 6–20)
CALCIUM SERPL-MCNC: 10 MG/DL (ref 8.6–10.2)
CHLORIDE BLD-SCNC: 102 MMOL/L (ref 98–107)
CHOLESTEROL, TOTAL: 196 MG/DL (ref 0–199)
CO2: 25 MMOL/L (ref 22–29)
CREAT SERPL-MCNC: 1.2 MG/DL (ref 0.7–1.2)
GFR AFRICAN AMERICAN: >60
GFR NON-AFRICAN AMERICAN: >60 ML/MIN/1.73
GLUCOSE BLD-MCNC: 93 MG/DL (ref 74–99)
HCT VFR BLD CALC: 48.7 % (ref 37–54)
HDLC SERPL-MCNC: 40 MG/DL
HEMOGLOBIN: 15.8 G/DL (ref 12.5–16.5)
LDL CHOLESTEROL CALCULATED: 137 MG/DL (ref 0–99)
MCH RBC QN AUTO: 29.2 PG (ref 26–35)
MCHC RBC AUTO-ENTMCNC: 32.4 % (ref 32–34.5)
MCV RBC AUTO: 89.9 FL (ref 80–99.9)
PDW BLD-RTO: 12.4 FL (ref 11.5–15)
PLATELET # BLD: 142 E9/L (ref 130–450)
PMV BLD AUTO: 12.9 FL (ref 7–12)
POTASSIUM SERPL-SCNC: 4.7 MMOL/L (ref 3.5–5)
PROSTATE SPECIFIC ANTIGEN: 0.98 NG/ML (ref 0–4)
RBC # BLD: 5.42 E12/L (ref 3.8–5.8)
SODIUM BLD-SCNC: 141 MMOL/L (ref 132–146)
TOTAL PROTEIN: 8.2 G/DL (ref 6.4–8.3)
TRIGL SERPL-MCNC: 94 MG/DL (ref 0–149)
TSH SERPL DL<=0.05 MIU/L-ACNC: 1.73 UIU/ML (ref 0.27–4.2)
VLDLC SERPL CALC-MCNC: 19 MG/DL
WBC # BLD: 4.8 E9/L (ref 4.5–11.5)

## 2022-04-05 ENCOUNTER — OFFICE VISIT (OUTPATIENT)
Dept: PHYSICAL MEDICINE AND REHAB | Age: 57
End: 2022-04-05
Payer: COMMERCIAL

## 2022-04-05 VITALS
WEIGHT: 227 LBS | HEART RATE: 73 BPM | DIASTOLIC BLOOD PRESSURE: 88 MMHG | BODY MASS INDEX: 33.62 KG/M2 | SYSTOLIC BLOOD PRESSURE: 137 MMHG | HEIGHT: 69 IN

## 2022-04-05 DIAGNOSIS — S13.9XXS NECK SPRAIN, SEQUELA: ICD-10-CM

## 2022-04-05 DIAGNOSIS — M50.221 HERNIATED NUCLEUS PULPOSUS, C4-5: ICD-10-CM

## 2022-04-05 DIAGNOSIS — M54.12 C6 RADICULOPATHY: ICD-10-CM

## 2022-04-05 DIAGNOSIS — G24.3 CERVICAL DYSTONIA: Primary | ICD-10-CM

## 2022-04-05 PROCEDURE — 99214 OFFICE O/P EST MOD 30 MIN: CPT | Performed by: PHYSICAL MEDICINE & REHABILITATION

## 2022-04-05 RX ORDER — PREGABALIN 225 MG/1
225 CAPSULE ORAL 2 TIMES DAILY
Qty: 60 CAPSULE | Refills: 2 | Status: SHIPPED | OUTPATIENT
Start: 2022-04-05 | End: 2022-05-05

## 2022-04-05 NOTE — PROGRESS NOTES
Edward Grijalva D.O., P.T. [facility] Physical Medicine and Rehabilitation  [address] [facility], [address] [place] [address]  Phone: [phone]  Fax: [phone]      4/5/2022    [facility] Claim #: [id number]   [facility] DOI: 4/19/16   [facility] POR:Dr. Jan Adams  [facility] ATTY: Cecil Peoples, [address], & Gopi Blakely, LPA. [facility] Alllowed conditions:  C58.091P CONTUSION OF LEFT SHOULDER LEFT, S60.221A CONTUSION OF RIGHT HAND RIGHT,  Y61.486Y UNSPECIFIED SPRAIN OF LEFT SHOULDER JOINT LEFT, H60.522 ACUTE CHEMICAL OTITIS EXTERNA, LEFT EAR, S13. 4XXA SPRAIN OF LIGAMENTS OF CERVICAL SPINE, S46.012A FULL THICKNESS ROTATOR CUFF TEAR SHOULDER  LEFT, S46.112A RUPTURED LONG HEAD BICEP TENDON SHOULDER  LEFT, M19.012 SUB AGG ACROMIOCLAVICULAR ARTHROSIS SHOULDER  LEFT, M54.12 RADICULOPATHY, CERVICAL REGION  C6.    [facility] Dismissed Conditions:  M18.11 ARTHROSIS OF THE FIRST METACARPOPHALANGEAL JOINT HAND  RIGHT, M65.841 TENOSYNOVITIS HAND  RIGHT, S62.614S DISP FX OF PROXIMAL PHALANX OF RIGHT RING FINGER, SEQUELA RIGHT, S46.012A PARTIAL THICKNESS ROTATOR CUFF TEAR SHOULDER  LEFT. Chief Complaint   Patient presents with    Neck Pain   HPI:  Since the last visit the patient states cervical dystonia was added to his claim. Denies Lyrica side effects. He continues to have neck pain and stiffness. He had a hearing last week for additional allowances. Today, he has pain in his neck and radiating to his right bicep, chest wall, medial forearm,thumb and index fingers and is described as burning, aching. Pain is currently rated Pain Score:   6. There is associated cervical crepitus, stiffness. The neck pain is worse with movement, better with Flexeril, Cymbalta, Lyrica, Diclofenac. There is associated weakness, paresthesias. Past Medical History:   Diagnosis Date    Asthma     due to exposure to chemical    Glaucoma     Neck pain      The injured worker denies any prior injury to the same body area injured in the claim.      Past Surgical History:   Procedure Laterality Date    ANESTHESIA NERVE BLOCK Bilateral 01/14/2020    BILATERAL CERVICAL MEDIAL BRANCH NERVE BLOCK UNDER FLUOROSCOPIC GUIDANCE AT C2,C3 AND C4 WITHOUT SEDATION (CPT 00987,91566) performed by Wilton Douglas MD at [address] [facility]  11/07/2018    EYE SURGERY Left     due to glaucoma    GLAUCOMA SURGERY Bilateral 2021    LAPAROSCOPIC APPENDECTOMY  05/23/2016    NERVE BLOCK Bilateral 01/14/2020    cervical medial branch nerve block    NERVE BLOCK Bilateral 12/07/2020    cervical medial branch facet block     NERVE BLOCK Bilateral 12/07/2020    BILATERAL CERVICAL MEDIAL BRANCH FACET BLOCK C2,C3,C4 WITHOUT SEDATION (CPT 15755) performed by Wilton Douglas MD at [address] Right 8/23/2021    CERVICAL EPIDURAL STEROID INJECTION C6-7 RIGHT PARAMEDIAN WITH 80 DEPO performed by Wilton Douglas MD at [address] [facility] Bilateral    [place] Bilateral 1998 r 2007 left     The injured worker denies any prior surgeries to the same body area injured in the claim.      Social History     Social History    Marital status:      Social History Main Topics    Smoking status: Never Smoker    Smokeless tobacco: Never Used    Alcohol use Yes      Comment: rarely    Drug use: No    Sexual activity: Not on file     Family History   Problem Relation Age of Onset    Asthma Mother     High Blood Pressure Mother     Cancer Father         Lung    High Blood Pressure Father     Glaucoma Sister     Glaucoma Brother     Other Brother         Sarcoidosis of Lungs    Diabetes Paternal Grandmother     Heart Attack Paternal Grandfather     Drug Abuse Brother        Allergies   Allergen Reactions    Lipitor Anaphylaxis    Sulfa Antibiotics Anaphylaxis       Current Outpatient Medications   Medication Sig Dispense Refill    pregabalin (LYRICA) 225 MG capsule Take 1 capsule by mouth 2 times daily for 30 days. 60 capsule 2    simvastatin (ZOCOR) 20 MG tablet TAKE ONE TABLET BY MOUTH NIGHTLY 90 tablet 1    tiZANidine (ZANAFLEX) 4 MG tablet Take 1 tablet by mouth 2 times daily as needed (muscle spasms) 60 tablet 2    colchicine (COLCRYS) 0.6 MG tablet Take 1 tablet by mouth 2 times daily 20 tablet 0    Multiple Vitamins-Minerals (CENTRUM SPECIALIST HEART) TABS Take 1 tablet by mouth 2 times daily      DULoxetine (CYMBALTA) 60 MG extended release capsule Take 60 mg by mouth daily       DULoxetine (CYMBALTA) 30 MG extended release capsule Take 30 mg by mouth daily       diclofenac (VOLTAREN) 75 MG EC tablet Take 1 tablet by mouth 2 times daily 60 tablet 3    BREO ELLIPTA 200-25 MCG/INH AEPB inhaler       dorzolamide (TRUSOPT) 2 % ophthalmic solution 2 drops 3 times daily      brimonidine (ALPHAGAN) 0.2 % ophthalmic solution INSTILL ONE DROP TWO TIMES EVERY DAY INTO BOTH EYES.  3    doxepin (SINEQUAN) 50 MG capsule 50 mg as needed       latanoprost (XALATAN) 0.005 % ophthalmic solution Place 1 drop into both eyes nightly       albuterol sulfate HFA (PROAIR HFA) 108 (90 Base) MCG/ACT inhaler Inhale 2 puffs into the lungs every 4 hours as needed for Wheezing or Shortness of Breath 1 Inhaler 6     No current facility-administered medications for this visit. ROS: No new weakness, paresthesia, incontinence of bowel or bladder, saddle anesthesia, falls or gait dysfunction. Physical Exam: Blood pressure 137/88, pulse 73, height 5' 9\" (1.753 m), weight 227 lb (103 kg). General: The patient is in no apparent distress. Body habitus is non-obese. HEENT: No rhinorrhea, sneezing, yawning, or lacrimation. No scleral icterus or conjunctival injection. SKIN: No piloerection. No track marks. No rash. Normal turgor. No erythema or ecchymosis. Psychological: Mood and affect are appropriate. Hygiene is appropriate. Cardiovascular:  Heart is regular rate and rhythm.   Peripheral pulses are 2+ at the dorsalis pedis, posterior tibial and radial arteries. There is no edema. Respiratory: Respirations are regular and unlabored. There is no cyanosis. Lymphatic: There is no cervical or inguinal lymphadenopathy. Gastrointestinal: Soft abdomen, non-tender. No pulsating abdominal mass. Genitourinary: No costovertebral angle tenderness. MSK: Cervical: Cervical lordosis increased, head posture in retrocollis,  thoracic kyphosis normal and lumbar lordosis normal. No superficial or bony tenderness. Tender at bilateral cervical paraspinals and trapezius. No edema, erythema, ecchymosis, effusion, instability, mass or deformity. No midline bony tenderness. Supple cervical paraspinals and trapezius. Spurling is positive left. Cervical AROM flexion is 70*, extension is 30*, lateral flexion is 40* bilaterally, rotation is 70* right, 60* left. Resting head posture with eyes closed is right laterocollis and anterocollis. Shoulder: No edema, erythema, ecchymosis, effusion, instability, mass or deformity. Shoulder AROM is full. No painful arc. No crepitus. No tenderness to palpation at the acromioclavicular joint, subacromial space or biceps tendon insertion. Negative Esequiel-Koch, Scarf,  Drop arm, and Speeds. Neurologic: Awake, alert and oriented in three planes. Speech is fluent. No facial weakness. Tongue is midline. Hearing is intact for conversation. Pupils are equal and round. Extraocular muscles are intact. Hearing is intact for conversation. Shoulder shrug symmetric. Sensation: Intact for light touch and pin prick in all upper and lower extremity dermatomes. Vibration and proprioception are intact at the bilateral first MTP. Strength: 4/5 left shoulder abduction, otherwise, 5/5 in all myotomes in the upper and lower extremities. Muscle Tendon Reflexes: 1+ symmetric in the bilateral upper and 2+ lower extremities. Babinski is downgoing bilaterally. Lenoria Lasso is negative bilaterally.  Gait is Normal. Romberg is negative. Heel and toe walk are normal.  Tandem walk is normal.  No clonus or spasticity. The patient was able to rise from a chair and squat without difficulty. There is no tremor. Impression:   1. Cervical dystonia    2. Herniated nucleus pulposus, C4-5    3. C6 radiculopathy    4. Neck sprain, sequela      Plan:  Continued follow up with pain management. Controlled Substance Monitoring:  Acute and Chronic Pain Monitoring:   RX Monitoring 4/5/2022   Periodic Controlled Substance Monitoring No signs of potential drug abuse or diversion identified. Orders Placed This Encounter   Medications    pregabalin (LYRICA) 225 MG capsule     Sig: Take 1 capsule by mouth 2 times daily for 30 days. Dispense:  60 capsule     Refill:  2     Medications Discontinued During This Encounter   Medication Reason    pregabalin (LYRICA) 225 MG capsule REORDER        The patient experiences neck pain with abnormal head position and has been diagnosed with cervical dystonia. The patient admits to the use of sensory tricks/gestes antagonistes. Upon repetitive alternating movement of the upper extremities, the head position worsens. During gait the head position also worsens. The prior treatment has included: PT, anti-spasticity medications with no improvement in symptoms. Goals of Botox treatment for cervical dystonia were discussed and include improved head position and reduced neck pain. PA for Botox cervical dystonia using EMG guidance to maximize toxin effect, minimize risk to nearby neurovascular structures and minimize distant spread of toxin.    The following muscles will be injected:   Bilateral   Splenius Capitis 15  units  (superior to levator in posterior triangle, mastiod/sup nuchal to the sp C7 to T4) = 30 units  Splenius Cervicis 20 units (C1-4 TP to ligamentum nuchae C7-T4)=40 units  Semispinalis Capitis 30 units (@hairline 2 FB lateral to midline) =60 units  Upper Trapezius 20 units (T pattern) = 40 units  Longissimus 30 units (2FB below lower palpable border of skull and 3 FB lateral to midline)= 60 units  Levator Scapulae 20 units (1FB superior from tip of scapula medially)= 40 units    Left  Sternocleidomastoid 20 units  Total = 300 units. Total waste 0 units    Informed Consent: The patient was educated about the  indications, risks, benefits and alternatives of chemodenervation with onabotulinum toxin A. I explained the potential side effects including but not limited to: distant spread of toxin effect causing generalized muscle weakness, injection site pain,  musculoskeletal pain,  Myalgia, muscle spasms, hypertension, pneumothorax, hypersensitivity, anaphylaxis, dysphagia, dysphonia, dysarthria, urinany incontinence and breathing difficulty. The patient is aware that swallowing and breathing difficulties can be life threatening and there have been reports of death in some cases. We also discussed the expected benefit which includes reduction in muscle spasticity to allow improved function of the extremity and the anticipated duration of effect of 3 months. We discussed it may take a few weeks to start to see effect and the maximum effect will be in about 6 weeks. An opportunity to ask questions was given to the patient and questions were answered. The patient agreed to proceed with the procedure. Continue Cymbalta per psychiatry and Zanaflex per Dr. Susan Hayes. Continued follow up with psychology and psychiatry. Continue home exercise program.     The patient was educated about the diagnosis, prognosis, indications, risks and benefits of treatment. An opportunity to ask questions was given to the patient and questions were answered. The patient agreed to proceed with the recommended treatment as described above. Follow up 3 months. Thank you for allowing me to participate in the care of your patient. Aguila Manuel D.O., P.T.   Board Certified Physical Medicine and Rehabilitation  Board Certified Electrodiagnostic Medicine

## 2022-05-30 DIAGNOSIS — M89.8X1 PAIN OF RIGHT SCAPULA: ICD-10-CM

## 2022-05-31 RX ORDER — TIZANIDINE 4 MG/1
TABLET ORAL
Qty: 60 TABLET | Refills: 5 | Status: SHIPPED
Start: 2022-05-31 | End: 2022-10-04 | Stop reason: SDUPTHER
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
## 2022-07-11 ENCOUNTER — OFFICE VISIT (OUTPATIENT)
Dept: ORTHOPEDIC SURGERY | Age: 57
End: 2022-07-11
Payer: COMMERCIAL

## 2022-07-11 VITALS — BODY MASS INDEX: 33.62 KG/M2 | WEIGHT: 227 LBS | HEIGHT: 69 IN

## 2022-07-11 DIAGNOSIS — M25.562 CHRONIC PAIN OF BOTH KNEES: Primary | ICD-10-CM

## 2022-07-11 DIAGNOSIS — M25.561 CHRONIC PAIN OF BOTH KNEES: Primary | ICD-10-CM

## 2022-07-11 DIAGNOSIS — G89.29 CHRONIC PAIN OF BOTH KNEES: Primary | ICD-10-CM

## 2022-07-11 PROCEDURE — 20610 DRAIN/INJ JOINT/BURSA W/O US: CPT | Performed by: ORTHOPAEDIC SURGERY

## 2022-07-11 PROCEDURE — 99214 OFFICE O/P EST MOD 30 MIN: CPT | Performed by: ORTHOPAEDIC SURGERY

## 2022-07-11 RX ORDER — TRIAMCINOLONE ACETONIDE 40 MG/ML
40 INJECTION, SUSPENSION INTRA-ARTICULAR; INTRAMUSCULAR ONCE
Status: COMPLETED | OUTPATIENT
Start: 2022-07-11 | End: 2022-07-12

## 2022-07-11 RX ORDER — BUPIVACAINE HYDROCHLORIDE 2.5 MG/ML
2 INJECTION, SOLUTION INFILTRATION; PERINEURAL ONCE
Status: COMPLETED | OUTPATIENT
Start: 2022-07-11 | End: 2022-07-12

## 2022-07-11 RX ORDER — FLUCONAZOLE 100 MG/1
TABLET ORAL
COMMUNITY
Start: 2022-07-07 | End: 2022-10-04

## 2022-07-12 DIAGNOSIS — G89.29 CHRONIC PAIN OF BOTH KNEES: ICD-10-CM

## 2022-07-12 DIAGNOSIS — M25.562 CHRONIC PAIN OF BOTH KNEES: ICD-10-CM

## 2022-07-12 DIAGNOSIS — M17.0 PRIMARY OSTEOARTHRITIS OF BOTH KNEES: Primary | ICD-10-CM

## 2022-07-12 DIAGNOSIS — M25.561 CHRONIC PAIN OF BOTH KNEES: ICD-10-CM

## 2022-07-12 RX ORDER — DICLOFENAC SODIUM 75 MG/1
75 TABLET, DELAYED RELEASE ORAL 2 TIMES DAILY
Qty: 60 TABLET | Refills: 3 | Status: SHIPPED | OUTPATIENT
Start: 2022-07-12

## 2022-07-12 RX ADMIN — BUPIVACAINE HYDROCHLORIDE 5 MG: 2.5 INJECTION, SOLUTION INFILTRATION; PERINEURAL at 08:22

## 2022-07-12 RX ADMIN — BUPIVACAINE HYDROCHLORIDE 5 MG: 2.5 INJECTION, SOLUTION INFILTRATION; PERINEURAL at 08:21

## 2022-07-12 RX ADMIN — TRIAMCINOLONE ACETONIDE 40 MG: 40 INJECTION, SUSPENSION INTRA-ARTICULAR; INTRAMUSCULAR at 08:20

## 2022-07-12 NOTE — TELEPHONE ENCOUNTER
Pt called into the office yesterday after his OV requesting a prescription for Diclofenac. Pt states TJB had him on it and it worked well and he would like to try it again. Pended & routed for review.

## 2022-07-13 ENCOUNTER — TELEPHONE (OUTPATIENT)
Dept: FAMILY MEDICINE CLINIC | Age: 57
End: 2022-07-13

## 2022-07-13 NOTE — TELEPHONE ENCOUNTER
Patient was in steam room and now he has Hiccups really bad. He says if he eats anything spicy or warm sets it off. Patient has had this for two days now. He had surgery 2yrs ago on his C4 and C5  And had neck fusion and it started then. He ended up in ER after with non stop Hiccups and they ended up giving him Thorazine and it stopped it. Patient is asking if there is something you can call in for him. He leaves for [facility]. the 17th. Would like to get this under control. Please advise. Last seen 3/3/2022  Next appt 9/12/2022    [facility] [place].

## 2022-07-14 RX ORDER — CHLORPROMAZINE HYDROCHLORIDE 25 MG/1
25 TABLET, FILM COATED ORAL 3 TIMES DAILY PRN
Qty: 12 TABLET | Refills: 0 | Status: SHIPPED
Start: 2022-07-14 | End: 2022-10-04

## 2022-07-14 NOTE — TELEPHONE ENCOUNTER
Please inform patient that script for Thorazine 25 mg sent to his pharmacy to take 3 times per day as needed.

## 2022-10-04 ENCOUNTER — OFFICE VISIT (OUTPATIENT)
Dept: FAMILY MEDICINE CLINIC | Age: 57
End: 2022-10-04
Payer: COMMERCIAL

## 2022-10-04 VITALS
HEIGHT: 69 IN | RESPIRATION RATE: 20 BRPM | HEART RATE: 86 BPM | WEIGHT: 212 LBS | BODY MASS INDEX: 31.4 KG/M2 | OXYGEN SATURATION: 94 % | SYSTOLIC BLOOD PRESSURE: 124 MMHG | DIASTOLIC BLOOD PRESSURE: 84 MMHG

## 2022-10-04 DIAGNOSIS — Z00.00 WELLNESS EXAMINATION: Primary | ICD-10-CM

## 2022-10-04 DIAGNOSIS — Z12.5 PROSTATE CANCER SCREENING: ICD-10-CM

## 2022-10-04 DIAGNOSIS — E78.2 MIXED HYPERLIPIDEMIA: ICD-10-CM

## 2022-10-04 PROCEDURE — 99396 PREV VISIT EST AGE 40-64: CPT | Performed by: FAMILY MEDICINE

## 2022-10-04 RX ORDER — TIZANIDINE 4 MG/1
TABLET ORAL
Qty: 60 TABLET | Refills: 3 | Status: SHIPPED | OUTPATIENT
Start: 2022-10-04

## 2022-10-04 SDOH — ECONOMIC STABILITY: FOOD INSECURITY: WITHIN THE PAST 12 MONTHS, THE FOOD YOU BOUGHT JUST DIDN'T LAST AND YOU DIDN'T HAVE MONEY TO GET MORE.: NEVER TRUE

## 2022-10-04 SDOH — ECONOMIC STABILITY: FOOD INSECURITY: WITHIN THE PAST 12 MONTHS, YOU WORRIED THAT YOUR FOOD WOULD RUN OUT BEFORE YOU GOT MONEY TO BUY MORE.: NEVER TRUE

## 2022-10-04 ASSESSMENT — ENCOUNTER SYMPTOMS
NAUSEA: 0
DIARRHEA: 0
VOMITING: 0
SHORTNESS OF BREATH: 0

## 2022-10-04 ASSESSMENT — LIFESTYLE VARIABLES: HOW OFTEN DO YOU HAVE A DRINK CONTAINING ALCOHOL: NEVER

## 2022-10-04 NOTE — PROGRESS NOTES
Annual exam:  Patient is here for routine yearly physical/preventative visit. Patient has no complaints or concerns today. Patient is  generally healthy. Chronic medical problems include hyperlipidemia. These are generally controlled. /84 today. Health maintenance reviewed with patient and is  up to date. Patient does not smoke. Patient does drink alcohol. Patient  does not use drugs. Overall doing well. Patient's pastmedical, surgical, social and/or family history reviewed, updated in chart, and are non-contributory (unless otherwise stated). Medications and allergies also reviewed and updated in chart. Current Outpatient Medications   Medication Sig Dispense Refill    tiZANidine (ZANAFLEX) 4 MG tablet TAKE ONE TABLET BY MOUTH TWICE DAILY AS NEEDED for muscle spasms 60 tablet 3    diclofenac (VOLTAREN) 75 MG EC tablet Take 1 tablet by mouth 2 times daily 60 tablet 3    simvastatin (ZOCOR) 20 MG tablet TAKE ONE TABLET BY MOUTH NIGHTLY 90 tablet 1    colchicine (COLCRYS) 0.6 MG tablet Take 1 tablet by mouth 2 times daily 20 tablet 0    Multiple Vitamins-Minerals (CENTRUM SPECIALIST HEART) TABS Take 1 tablet by mouth 2 times daily      DULoxetine (CYMBALTA) 60 MG extended release capsule Take 60 mg by mouth daily       DULoxetine (CYMBALTA) 30 MG extended release capsule Take 30 mg by mouth daily       diclofenac (VOLTAREN) 75 MG EC tablet Take 1 tablet by mouth 2 times daily 60 tablet 3    BREO ELLIPTA 200-25 MCG/INH AEPB inhaler       dorzolamide (TRUSOPT) 2 % ophthalmic solution 2 drops 3 times daily      brimonidine (ALPHAGAN) 0.2 % ophthalmic solution INSTILL ONE DROP TWO TIMES EVERY DAY INTO BOTH EYES.  3    doxepin (SINEQUAN) 50 MG capsule 50 mg as needed       latanoprost (XALATAN) 0.005 % ophthalmic solution Place 1 drop into both eyes nightly       pregabalin (LYRICA) 225 MG capsule Take 1 capsule by mouth 2 times daily for 30 days.  60 capsule 2    albuterol sulfate HFA (PROAIR HFA) 108 (90 Base) MCG/ACT inhaler Inhale 2 puffs into the lungs every 4 hours as needed for Wheezing or Shortness of Breath 1 Inhaler 6     No current facility-administered medications for this visit. /84   Pulse 86   Resp 20   Ht 5' 9\" (1.753 m)   Wt 212 lb (96.2 kg)   SpO2 94%   BMI 31.31 kg/m²     Review of Systems   Eyes:  Negative for visual disturbance. Respiratory:  Negative for shortness of breath. Cardiovascular:  Negative for chest pain, palpitations and leg swelling. Gastrointestinal:  Negative for diarrhea, nausea and vomiting. Genitourinary:  Negative for difficulty urinating, dysuria and frequency. Skin:  Negative for rash. +right thumbnail fungus   Psychiatric/Behavioral:  Negative for dysphoric mood. Physical Exam  Vitals reviewed. Constitutional:       General: He is not in acute distress. Appearance: He is well-developed. Neck:      Vascular: No carotid bruit. Cardiovascular:      Rate and Rhythm: Normal rate and regular rhythm. Heart sounds: Normal heart sounds. No murmur heard. No gallop. Pulmonary:      Effort: Pulmonary effort is normal.      Breath sounds: Normal breath sounds. No wheezing or rales. Abdominal:      General: Bowel sounds are normal. There is no distension. Palpations: Abdomen is soft. Tenderness: There is no abdominal tenderness. Musculoskeletal:      Cervical back: Neck supple. Right lower leg: No edema. Left lower leg: No edema. Skin:     General: Skin is warm and dry. Neurological:      Mental Status: He is alert and oriented to person, place, and time. Swetha Whitney was seen today for annual exam.    Diagnoses and all orders for this visit:    Wellness examination  -     Lipid Panel; Future  -     TSH; Future    Mixed hyperlipidemia  -     CBC; Future  -     Comprehensive Metabolic Panel; Future  -     Lipid Panel; Future  -     TSH;  Future    Prostate cancer screening  -     PSA Screening; Future        Return in about 6 months (around 4/4/2023) for hyperlipidemia, 30 minute slot please. , or sooner if necessary. Phone/MyChart follow up if tests abnormal.    Educational materials and/or home exercises printed for patient's review and were included in patient instructions on his/her After Visit Summary and given to patient at the end of visit. Counseled regarding above diagnosis, including possible risks and complications,  especially if left uncontrolled. Counseled regarding thepossible side effects, risks, benefits and alternatives to treatment; patient and/or guardian verbalizes understanding, agrees, feels comfortable with and wishes to proceed with above treatment plan. Advised patientto call with any new medication issues, and read all Rx info from pharmacy to assure aware of all possible risks and side effects of medication before taking. Reviewed age and gender appropriate health screeningexams and vaccinations. Advised patient regarding importance of keeping up with recommended health maintenance and to schedule as soon as possible if overdue, as this is important in assessing for undiagnosed pathology,especially cancer, as well as protecting against potentially harmful/life threatening disease. Patient and/or guardian verbalizes understanding and agrees with above counseling, assessment and plan. Allquestions answered.

## 2022-12-22 ENCOUNTER — OFFICE VISIT (OUTPATIENT)
Dept: PAIN MANAGEMENT | Age: 57
End: 2022-12-22
Payer: COMMERCIAL

## 2022-12-22 VITALS
OXYGEN SATURATION: 98 % | WEIGHT: 212 LBS | RESPIRATION RATE: 16 BRPM | DIASTOLIC BLOOD PRESSURE: 80 MMHG | BODY MASS INDEX: 31.4 KG/M2 | TEMPERATURE: 97.2 F | HEART RATE: 68 BPM | HEIGHT: 69 IN | SYSTOLIC BLOOD PRESSURE: 120 MMHG

## 2022-12-22 DIAGNOSIS — M50.90 CERVICAL DISC DISORDER: ICD-10-CM

## 2022-12-22 DIAGNOSIS — M54.12 C6 RADICULOPATHY: ICD-10-CM

## 2022-12-22 DIAGNOSIS — M47.812 CERVICAL SPONDYLOSIS: ICD-10-CM

## 2022-12-22 DIAGNOSIS — G89.4 CHRONIC PAIN SYNDROME: Primary | ICD-10-CM

## 2022-12-22 DIAGNOSIS — M54.12 CERVICAL RADICULOPATHY: ICD-10-CM

## 2022-12-22 DIAGNOSIS — M54.12 CERVICAL RADICULITIS: ICD-10-CM

## 2022-12-22 DIAGNOSIS — M47.812 CERVICAL FACET JOINT SYNDROME: ICD-10-CM

## 2022-12-22 PROCEDURE — 99214 OFFICE O/P EST MOD 30 MIN: CPT | Performed by: PAIN MEDICINE

## 2022-12-22 PROCEDURE — 99213 OFFICE O/P EST LOW 20 MIN: CPT | Performed by: PAIN MEDICINE

## 2022-12-22 RX ORDER — SODIUM CHLORIDE 9 MG/ML
INJECTION, SOLUTION INTRAVENOUS PRN
OUTPATIENT
Start: 2022-12-22

## 2022-12-22 RX ORDER — SODIUM CHLORIDE 0.9 % (FLUSH) 0.9 %
5-40 SYRINGE (ML) INJECTION EVERY 12 HOURS SCHEDULED
OUTPATIENT
Start: 2022-12-22

## 2022-12-22 RX ORDER — SODIUM CHLORIDE 0.9 % (FLUSH) 0.9 %
5-40 SYRINGE (ML) INJECTION PRN
OUTPATIENT
Start: 2022-12-22

## 2022-12-22 NOTE — PROGRESS NOTES
[address]  [address] [facility], [address] [place] [address]  [phone]    Follow up Note      Vince Camejo     Date of Visit:  12/22/2022    CC:  Patient presents for follow up   Chief Complaint   Patient presents with    Neck Pain     HPI:  Office extension for worsening neck pain with radiation to the Left upper extremity, last seen 11/2021. Change in quality of symptoms:none  Medication side effects:not applicable . Recent diagnostic testing:none  Recent interventional procedures:none    He has not been on anticoagulation medications to include none. The patient  has not been on herbal supplements. The patient is not diabetic. Imaging:   Cervical spine MRI 12/2017      1. Straightening of the cervical spine with loss of normal lordosis. 2. No compression fracture or spondylolisthesis. 3. Multilevel mild to moderate degenerative disc disease of the   cervical spine as detailed above. 4. Mild to moderate facet joint arthropathy at left C2-C3. Left knee Xray 2012  Arthritis    Left knee Xray 2021:  Mild-to-moderate degenerative findings, most notably involving the   patellofemoral joint. Thoracic spine Xray   Normal    Previous treatments: Physical Therapy, Epidural Steroid Injection 2016, per patient it had helped and medications. .      Urine screen:   None no opioids started     OARRS report   12/2022 consistent     Opioid agreement:  Renewal date:N/A    Past Medical History:   Diagnosis Date    Asthma     due to exposure to chemical    Glaucoma     Neck pain      Past Surgical History:   Procedure Laterality Date    ANESTHESIA NERVE BLOCK Bilateral 01/14/2020    BILATERAL CERVICAL MEDIAL BRANCH NERVE BLOCK UNDER FLUOROSCOPIC GUIDANCE AT C2,C3 AND C4 WITHOUT SEDATION (CPT 93352,92449) performed by Samira Matos MD at [facility]  11/07/2018    EYE SURGERY Left     due to [place] Bilateral 2021 LAPAROSCOPIC APPENDECTOMY  05/23/2016    NERVE BLOCK Bilateral 01/14/2020    cervical medial branch nerve block    NERVE BLOCK Bilateral 12/07/2020    cervical medial branch facet block     NERVE BLOCK Bilateral 12/07/2020    BILATERAL CERVICAL MEDIAL BRANCH FACET BLOCK C2,C3,C4 WITHOUT SEDATION (CPT 92204) performed by Dee Hall MD at [facility] Right 8/23/2021    CERVICAL EPIDURAL STEROID INJECTION C6-7 RIGHT PARAMEDIAN WITH 80 DEPO performed by Dee Hall MD at [facility] Bilateral     SHOULDER SURGERY Bilateral 1998 r 2007 left     Prior to Admission medications    Medication Sig Start Date End Date Taking?  Authorizing Provider   tiZANidine (ZANAFLEX) 4 MG tablet TAKE ONE TABLET BY MOUTH TWICE DAILY AS NEEDED for muscle spasms 10/4/22  Yes Suzanne Goodwin MD   diclofenac (VOLTAREN) 75 MG EC tablet Take 1 tablet by mouth 2 times daily 7/12/22  Yes Kate Ortiz MD   simvastatin (ZOCOR) 20 MG tablet TAKE ONE TABLET BY MOUTH NIGHTLY 3/3/22  Yes Suzanne Goodwin MD   colchicine (COLCRYS) 0.6 MG tablet Take 1 tablet by mouth 2 times daily 7/12/21  Yes Khadijah Cox, APRN - CNP   Multiple Vitamins-Minerals (CENTRUM SPECIALIST HEART) TABS Take 1 tablet by mouth 2 times daily 8/12/20  Yes Suzanne Goodwin MD   DULoxetine (CYMBALTA) 60 MG extended release capsule Take 60 mg by mouth daily  5/18/20  Yes Historical Provider, MD   DULoxetine (CYMBALTA) 30 MG extended release capsule Take 30 mg by mouth daily  5/18/20  Yes Historical Provider, MD Paras Doyle 200-25 MCG/INH AEPB inhaler  2/27/20  Yes Historical Provider, MD   dorzolamide (TRUSOPT) 2 % ophthalmic solution 2 drops 3 times daily   Yes Historical Provider, MD   brimonidine (ALPHAGAN) 0.2 % ophthalmic solution INSTILL ONE DROP TWO TIMES EVERY DAY INTO BOTH EYES. 6/18/19  Yes Historical Provider, MD   doxepin (SINEQUAN) 50 MG capsule 50 mg as needed 9/25/17  Yes Historical Provider, MD   latanoprost (XALATAN) 0.005 % ophthalmic solution Place 1 drop into both eyes nightly    Yes Historical Provider, MD   pregabalin (LYRICA) 225 MG capsule Take 1 capsule by mouth 2 times daily for 30 days. 4/5/22 5/5/22  Keri Louis,    albuterol sulfate HFA (PROAIR HFA) 108 (90 Base) MCG/ACT inhaler Inhale 2 puffs into the lungs every 4 hours as needed for Wheezing or Shortness of Breath 10/25/19 11/10/21  Jacklyn Matthews MD     Allergies   Allergen Reactions    Lipitor Anaphylaxis    Sulfa Antibiotics Anaphylaxis     Social History     Socioeconomic History    Marital status:      Spouse name: Not on file    Number of children: Not on file    Years of education: Not on file    Highest education level: Not on file   Occupational History    Not on file   Tobacco Use    Smoking status: Never    Smokeless tobacco: Never   Vaping Use    Vaping Use: Never used   Substance and Sexual Activity    Alcohol use: Yes     Comment: rare wine    Drug use: No    Sexual activity: Yes   Other Topics Concern    Not on file   Social History Narrative    Not on file     Social Determinants of Health     Financial Resource Strain: Not on file   Food Insecurity: No Food Insecurity    Worried About Running Out of Food in the Last Year: Never true    Ran Out of Food in the Last Year: Never true   Transportation Needs: Not on file   Physical Activity: Not on file   Stress: Stress Concern Present    Feeling of Stress :  To some extent   Social Connections: Not on file   Intimate Partner Violence: Not on file   Housing Stability: Not on file     Family History   Problem Relation Age of Onset    Asthma Mother     High Blood Pressure Mother     Cancer Father         Lung    High Blood Pressure Father     Glaucoma Sister     Glaucoma Brother     Other Brother         Sarcoidosis of Lungs    Diabetes Paternal Grandmother     Heart Attack Paternal Grandfather     Drug Abuse Brother      REVIEW OF SYSTEMS:     Miguel A Kenny denies fever/chills, chest pain, shortness of breath, new bowel or bladder complaints. All other review of systems was negative. PHYSICAL EXAMINATION:      /80   Pulse 68   Temp 97.2 °F (36.2 °C) (Infrared)   Resp 16   Ht 5' 9\" (1.753 m)   Wt 212 lb (96.2 kg)   SpO2 98%   BMI 31.31 kg/m²     General:       General appearance: awake, alert, oriented   pleasant and well-hydrated. , in moderate discomfort and A & O x3  Build:Normal Weight  Function:Rises from a seated position easily. HEENT:     Head:normocephalic and atraumatic  Sclera: icterus absent,      Lungs:     Breathing:Breathing Pattern: regular, no distress     Abdomen:     Shape:non-distended and normal  Scars:yes  Tenderness:none     Cervical spine:     Inspection:anterior fusion scar  Palpation:mild facet tenderness bilaterally. Range of motion:Limited and painful ROM bilaterally. .     Musculoskeletal:     Trigger points in Paraveteral:present bilaterally  Spurling's:negative right, negative left     Extremities:     Tremors:None bilaterally upper and lower  Range of motion:Generally normal shoulders  Intact:Yes  Edema:Normal     Neurological:     Sensory:normal to joint position sense and light touch   Motor:   Right Grip5/5              Left Grip5/5               Right Bicep5/5           Left Bicep5/5              Right Triceps5/5       Left Triceps5/5          Right Deltoid5/5     Left Deltoid5/5                  Reflexes:    Right Brachioradialis reflex2+  Left Brachioradialis reflex2+  Right Biceps reflex2+  Left Biceps reflex2+  Right Triceps reflex2+  Left Triceps reflex2+  Gait:normal     Dermatology:     Skin:no unusual rashes and no skin lesions     Assessment/Plan:   Chronic neck pain with radiation to the Left upper extremity that started after a work related injury in 2016   Patient had seen Adilene Hendricks but he didn't recommend surgery   Patient had seen  at [address] [place] [address] and had underwent C4-5/C5-6 anterior fusion 11/2018. Good outcome with cervical facet MBB 05/2021 and JORGE C6-7 08/2021  Plan:  Office extension for worsening neck pain with radiation to the Left upper extremity, last seen 11/2021. Reviewed cervical spine MRI 2017. Will schedule patient for updated cervical spine MRI. Patient had good outcome with JORGE C6-7 08/2021 and is asking to repeat will schedule(reviewed medications list no meds to be held). Currently seeing María Cabrera and is on Lyrica/Voltaren/Cymbalta/Flexeril. OARRS report reviewed 12/2022. Patient encouraged to stay active. Treatment plan discussed with the patient including procedure side effects. We discussed with the patient that combining opioids, benzodiazepines, alcohol, illicit drugs or sleep aids increases the risk of respiratory depression including death. We discussed that these medications may cause drowsiness, sedation or dizziness and have counseled the patient not to drive or operate machinery. We have discussed that these medications will be prescribed only by one provider. We have discussed with the patient about age related risk factors and have thoroughly discussed the importance of taking these medications as prescribed. The patient verbalizes understanding. ccrefrying marta Mart M.D.      12/22/2022

## 2022-12-22 NOTE — PROGRESS NOTES
Do you currently have any of the following:    Fever: No  Headache:  No  Cough: No  Shortness of breath: No  Exposed to anyone with these symptoms: Miesha Giron presents to the [address] [id number] on 12/22/2022. Pietro Citizen is complaining of pain in his neck. . The pain is constant. The pain is described as aching and throbbing. Pain is rated on his best day at a 4, on his worst day at a 6, and on average at a 5 on the VAS scale. He took his last dose of Lyrica and tizanidine. Concha Monge does not have issues with constipation. Any procedures since your last visit: No,     He is not on NSAIDS and  is not on anticoagulation medications to include none and is managed by NA. Pacemaker or defibrillator: No Physician managing device is NA. Medication Contract and Consent for Opioid Use Documents Filed       Patient Documents       Type of Document Status Date Received Received By Description    Medication Contract Received 9/30/2019  9:25 AM Ming Machuca Medication Agreement Lyrica 9-25-19                       /80   Pulse 68   Temp 97.2 °F (36.2 °C) (Infrared)   Resp 16   Ht 5' 9\" (1.753 m)   Wt 212 lb (96.2 kg)   SpO2 98%   BMI 31.31 kg/m²      No LMP for male patient.

## 2023-01-04 ENCOUNTER — TELEPHONE (OUTPATIENT)
Dept: PAIN MANAGEMENT | Age: 58
End: 2023-01-04

## 2023-01-04 NOTE — TELEPHONE ENCOUNTER
Call to Jamila Bernal that procedure was approved for 1/9/2023 and that [facility] should call him a few days before for the pre op call and after 3:00 PM the business day before with the arrival time. Instructed Randal to hold ibuprofen or voltaren for 24 hours, naprosyn for 4 days and any aspirin containing products or fish oil for 7 days. Instructed to call office back if any questions. Randal verbalized understanding.     Electronically signed by Carey Reyes RN on 1/4/2023 at 2:46 PM

## 2023-01-31 ENCOUNTER — TELEPHONE (OUTPATIENT)
Dept: PAIN MANAGEMENT | Age: 58
End: 2023-01-31

## 2023-01-31 NOTE — TELEPHONE ENCOUNTER
Call to Manuel Christina that procedure was approved for 2/9/2023 and that [facility] should call him a few days before for the pre op call and after 3:00 PM the business day before with the arrival time. Instructed Randal to hold ibuprofen or voltaren for 24 hours, naprosyn for 4 days and any aspirin containing products or fish oil for 7 days. Instructed to call office back if any questions. Randal verbalized understanding.     Electronically signed by Laxmi Patel RN on 1/31/2023 at 1:21 PM

## 2023-02-01 ENCOUNTER — ANESTHESIA EVENT (OUTPATIENT)
Dept: OPERATING ROOM | Age: 58
End: 2023-02-01
Payer: COMMERCIAL

## 2023-02-08 ENCOUNTER — TELEPHONE (OUTPATIENT)
Dept: FAMILY MEDICINE CLINIC | Age: 58
End: 2023-02-08

## 2023-02-08 ASSESSMENT — LIFESTYLE VARIABLES: SMOKING_STATUS: 0
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
## 2023-02-08 NOTE — TELEPHONE ENCOUNTER
----- Message from Katie Greenfield sent at 2/8/2023  2:47 PM EST -----  Subject: Message to Provider    QUESTIONS  Information for Provider? Pt stated he was injured at work and got   diagnosed with sleep apnea after the work injury; pt questions whether his   injury and subsequent surgery could have possibly caused the sleep apnea? Pt would like to discuss this with PCP at 02/14 appt.  ---------------------------------------------------------------------------  --------------  Jamison Duane INFO  [phone]; OK to leave message on voicemail  ---------------------------------------------------------------------------  --------------  SCRIPT ANSWERS  Relationship to Patient?  Self

## 2023-02-09 ENCOUNTER — HOSPITAL ENCOUNTER (OUTPATIENT)
Age: 58
Setting detail: OUTPATIENT SURGERY
Discharge: HOME | End: 2023-02-09
Attending: PAIN MEDICINE | Admitting: PAIN MEDICINE
Payer: COMMERCIAL

## 2023-02-09 ENCOUNTER — ANESTHESIA (OUTPATIENT)
Dept: OPERATING ROOM | Age: 58
End: 2023-02-09
Payer: COMMERCIAL

## 2023-02-09 ENCOUNTER — HOSPITAL ENCOUNTER (OUTPATIENT)
Dept: OPERATING ROOM | Age: 58
Setting detail: OUTPATIENT SURGERY
Discharge: HOME | End: 2023-02-09
Attending: PAIN MEDICINE
Payer: COMMERCIAL

## 2023-02-09 VITALS
SYSTOLIC BLOOD PRESSURE: 142 MMHG | WEIGHT: 205 LBS | RESPIRATION RATE: 16 BRPM | OXYGEN SATURATION: 96 % | BODY MASS INDEX: 30.36 KG/M2 | HEART RATE: 60 BPM | TEMPERATURE: 98.7 F | DIASTOLIC BLOOD PRESSURE: 94 MMHG | HEIGHT: 69 IN

## 2023-02-09 DIAGNOSIS — M54.12 CERVICAL RADICULOPATHY: ICD-10-CM

## 2023-02-09 PROCEDURE — 6360000002 HC RX W HCPCS: Performed by: PAIN MEDICINE

## 2023-02-09 PROCEDURE — 2500000003 HC RX 250 WO HCPCS: Performed by: PAIN MEDICINE

## 2023-02-09 PROCEDURE — 6360000004 HC RX CONTRAST MEDICATION: Performed by: PAIN MEDICINE

## 2023-02-09 PROCEDURE — 2709999900 HC NON-CHARGEABLE SUPPLY: Performed by: PAIN MEDICINE

## 2023-02-09 PROCEDURE — 7100000011 HC PHASE II RECOVERY - ADDTL 15 MIN: Performed by: PAIN MEDICINE

## 2023-02-09 PROCEDURE — 7100000010 HC PHASE II RECOVERY - FIRST 15 MIN: Performed by: PAIN MEDICINE

## 2023-02-09 PROCEDURE — 62321 NJX INTERLAMINAR CRV/THRC: CPT | Performed by: PAIN MEDICINE

## 2023-02-09 PROCEDURE — 3700000000 HC ANESTHESIA ATTENDED CARE: Performed by: PAIN MEDICINE

## 2023-02-09 PROCEDURE — 3600000005 HC SURGERY LEVEL 5 BASE: Performed by: PAIN MEDICINE

## 2023-02-09 PROCEDURE — 3209999900 FLUORO FOR SURGICAL PROCEDURES

## 2023-02-09 PROCEDURE — 2580000003 HC RX 258: Performed by: ANESTHESIOLOGY

## 2023-02-09 PROCEDURE — 6360000002 HC RX W HCPCS: Performed by: NURSE ANESTHETIST, CERTIFIED REGISTERED

## 2023-02-09 RX ORDER — LIDOCAINE HYDROCHLORIDE 5 MG/ML
INJECTION, SOLUTION INFILTRATION; INTRAVENOUS PRN
Status: DISCONTINUED | OUTPATIENT
Start: 2023-02-09 | End: 2023-02-09 | Stop reason: ALTCHOICE

## 2023-02-09 RX ORDER — SODIUM CHLORIDE, SODIUM LACTATE, POTASSIUM CHLORIDE, CALCIUM CHLORIDE 600; 310; 30; 20 MG/100ML; MG/100ML; MG/100ML; MG/100ML
INJECTION, SOLUTION INTRAVENOUS CONTINUOUS
Status: DISCONTINUED | OUTPATIENT
Start: 2023-02-09 | End: 2023-02-09 | Stop reason: HOSPADM

## 2023-02-09 RX ORDER — MIDAZOLAM HYDROCHLORIDE 1 MG/ML
INJECTION INTRAMUSCULAR; INTRAVENOUS PRN
Status: DISCONTINUED | OUTPATIENT
Start: 2023-02-09 | End: 2023-02-09 | Stop reason: SDUPTHER

## 2023-02-09 RX ORDER — SODIUM CHLORIDE 9 MG/ML
INJECTION, SOLUTION INTRAVENOUS PRN
Status: DISCONTINUED | OUTPATIENT
Start: 2023-02-09 | End: 2023-02-09 | Stop reason: HOSPADM

## 2023-02-09 RX ORDER — FENTANYL CITRATE 50 UG/ML
INJECTION, SOLUTION INTRAMUSCULAR; INTRAVENOUS PRN
Status: DISCONTINUED | OUTPATIENT
Start: 2023-02-09 | End: 2023-02-09 | Stop reason: SDUPTHER

## 2023-02-09 RX ORDER — SODIUM CHLORIDE 0.9 % (FLUSH) 0.9 %
5-40 SYRINGE (ML) INJECTION PRN
Status: DISCONTINUED | OUTPATIENT
Start: 2023-02-09 | End: 2023-02-09 | Stop reason: HOSPADM

## 2023-02-09 RX ORDER — SODIUM CHLORIDE 0.9 % (FLUSH) 0.9 %
5-40 SYRINGE (ML) INJECTION EVERY 12 HOURS SCHEDULED
Status: DISCONTINUED | OUTPATIENT
Start: 2023-02-09 | End: 2023-02-09 | Stop reason: HOSPADM

## 2023-02-09 RX ADMIN — MIDAZOLAM 2 MG: 1 INJECTION INTRAMUSCULAR; INTRAVENOUS at 14:32

## 2023-02-09 RX ADMIN — SODIUM CHLORIDE, POTASSIUM CHLORIDE, SODIUM LACTATE AND CALCIUM CHLORIDE: 600; 310; 30; 20 INJECTION, SOLUTION INTRAVENOUS at 13:32

## 2023-02-09 RX ADMIN — FENTANYL CITRATE 50 MCG: 50 INJECTION INTRAMUSCULAR; INTRAVENOUS at 14:33

## 2023-02-09 ASSESSMENT — PAIN DESCRIPTION - DESCRIPTORS: DESCRIPTORS: ACHING

## 2023-02-09 ASSESSMENT — PAIN SCALES - GENERAL: PAINLEVEL_OUTOF10: 2

## 2023-02-09 ASSESSMENT — PAIN - FUNCTIONAL ASSESSMENT: PAIN_FUNCTIONAL_ASSESSMENT: 0-10

## 2023-02-09 ASSESSMENT — PAIN DESCRIPTION - LOCATION: LOCATION: NECK

## 2023-02-09 NOTE — ANESTHESIA POSTPROCEDURE EVALUATION
Department of Anesthesiology  Postprocedure Note    Patient: Trice Moura  MRN: [id number]  YOB: 1965  Date of evaluation: 2/9/2023      Procedure Summary     Date: 02/09/23 Room / Location: [address] [place] [address] 04 / [facility]    Anesthesia Start: 6400 Anesthesia Stop: 9553    Procedure: CERVICAL EPIDURAL STEROID INJECTION C6-7 WITH 80 DEPO Diagnosis:       Radiculopathy, cervical      (Radiculopathy, cervical [M54.12])    Surgeons: Ofe Meek MD Responsible Provider: Fatimah Glover MD    Anesthesia Type: MAC ASA Status: 3          Anesthesia Type: No value filed.     Ellie Phase I: Ellie Score: 10    Ellie Phase II: Ellie Score: 10      Anesthesia Post Evaluation    Patient location during evaluation: PACU  Patient participation: complete - patient participated  Level of consciousness: awake  Pain score: 3  Airway patency: patent  Nausea & Vomiting: no nausea  Complications: no  Cardiovascular status: blood pressure returned to baseline  Respiratory status: acceptable  Hydration status: euvolemic

## 2023-02-09 NOTE — H&P
[address] [facility], [address] [place] [address] Paul  [phone]    Procedure History & Physical      Jonathan Daniel     HPI:    Patient  is here for neck and arm pain for JORGE C6-7  Labs/imaging studies reviewed   All question and concerns addressed including R/B/A associated with the procedure    Past Medical History:   Diagnosis Date    Asthma     due to exposure to chemical    COVID-19 11/2020    fever 1 day  mild congestion    Depression     Glaucoma     Hyperlipidemia     Neck pain     KATY on CPAP     Prolonged emergence from general anesthesia     with general anesthesia    Sleep apnea     has c-pap- but does not uses       Past Surgical History:   Procedure Laterality Date    ANESTHESIA NERVE BLOCK Bilateral 01/14/2020    BILATERAL CERVICAL MEDIAL BRANCH NERVE BLOCK UNDER FLUOROSCOPIC GUIDANCE AT C2,C3 AND C4 WITHOUT SEDATION (CPT 99374,81072) performed by Arden Rm MD at [address] [place] [address]  11/07/2018    EYE SURGERY Bilateral     due to glaucoma   (laser)    GLAUCOMA SURGERY Bilateral 2021    LAPAROSCOPIC APPENDECTOMY  05/23/2016    NERVE BLOCK Bilateral 01/14/2020    cervical medial branch nerve block    NERVE BLOCK Bilateral 12/07/2020    cervical medial branch facet block     NERVE BLOCK Bilateral 12/07/2020    BILATERAL CERVICAL MEDIAL BRANCH FACET BLOCK C2,C3,C4 WITHOUT SEDATION (CPT 74013) performed by Arden Rm MD at [address] [place] [address] 51 S Right 08/23/2021    CERVICAL EPIDURAL STEROID INJECTION C6-7 RIGHT PARAMEDIAN WITH 80 DEPO performed by Arden Rm MD at [address] Bilateral 1998 r 2007 left       Prior to Admission medications    Medication Sig Start Date End Date Taking?  Authorizing Provider   tiZANidine (ZANAFLEX) 4 MG tablet TAKE ONE TABLET BY MOUTH TWICE DAILY AS NEEDED for muscle spasms  Patient taking differently: TAKE ONE TABLET BY MOUTH TWICE DAILY AS NEEDED for muscle spasms- last taken 1/5/23 10/4/22   Collette Massy, MD   diclofenac (VOLTAREN) 75 MG EC tablet Take 1 tablet by mouth 2 times daily  Patient taking differently: Take 75 mg by mouth 2 times daily Hold pre-op; last taken 1/5/23 7/12/22   Heidi Barrera MD   pregabalin (LYRICA) 225 MG capsule Take 1 capsule by mouth 2 times daily for 30 days. Patient taking differently: No sig reported 4/5/22 5/5/22  Lalito Louis DO   simvastatin (ZOCOR) 20 MG tablet TAKE ONE TABLET BY MOUTH NIGHTLY  Patient taking differently: TAKE ONE TABLET BY MOUTH NIGHTLY- last taken 1/5/23 3/3/22   Collette Massy, MD   DULoxetine (CYMBALTA) 60 MG extended release capsule Take 60 mg by mouth daily Last taken 1/5/23- takes 90 mg 5/18/20   Historical Provider, MD   albuterol sulfate HFA (PROAIR HFA) 108 (90 Base) MCG/ACT inhaler Inhale 2 puffs into the lungs every 4 hours as needed for Wheezing or Shortness of Breath  Patient taking differently: Inhale 2 puffs into the lungs every 4 hours as needed for Wheezing or Shortness of Breath Last taken 1/5/23 10/25/19 11/10/21  Adriana Diaz MD   dorzolamide (TRUSOPT) 2 % ophthalmic solution Place 2 drops into both eyes in the morning and 2 drops in the evening.     Historical Provider, MD   brimonidine (ALPHAGAN) 0.2 % ophthalmic solution Place 1 drop into both eyes in the morning and at bedtime 6/18/19   Historical Provider, MD   doxepin (SINEQUAN) 50 MG capsule Take 50 mg by mouth as needed Last taken 1/5/23 9/25/17   Historical Provider, MD   latanoprost (XALATAN) 0.005 % ophthalmic solution Place 1 drop into both eyes nightly     Historical Provider, MD       Allergies   Allergen Reactions    Lipitor Anaphylaxis    Sulfa Antibiotics Anaphylaxis       Social History     Socioeconomic History    Marital status:      Spouse name: Not on file    Number of children: Not on file    Years of education: Not on file    Highest education level: Not on file   Occupational History    Not on file   Tobacco Use    Smoking status: Never    Smokeless tobacco: Never   Vaping Use    Vaping Use: Never used   Substance and Sexual Activity    Alcohol use: Yes     Comment: wine- special occasions    Drug use: No    Sexual activity: Yes   Other Topics Concern    Not on file   Social History Narrative    Not on file     Social Determinants of Health     Financial Resource Strain: Not on file   Food Insecurity: No Food Insecurity    Worried About Running Out of Food in the Last Year: Never true    Ran Out of Food in the Last Year: Never true   Transportation Needs: Not on file   Physical Activity: Not on file   Stress: Stress Concern Present    Feeling of Stress : To some extent   Social Connections: Not on file   Intimate Partner Violence: Not on file   Housing Stability: Not on file       Family History   Problem Relation Age of Onset    Asthma Mother     High Blood Pressure Mother     Cancer Father         Lung    High Blood Pressure Father     Glaucoma Sister     Glaucoma Brother     Other Brother         Sarcoidosis of Lungs    Diabetes Paternal Grandmother     Heart Attack Paternal Grandfather     Drug Abuse Brother          REVIEW OF SYSTEMS:    CONSTITUTIONAL:  negative for  fevers, chills, sweats and fatigue    RESPIRATORY:  negative for  dry cough, cough with sputum, dyspnea, wheezing and chest pain    CARDIOVASCULAR:  negative for chest pain, dyspnea, palpitations, syncope    GASTROINTESTINAL:  negative for nausea, vomiting, change in bowel habits, diarrhea, constipation and abdominal pain    MUSCULOSKELETAL: negative for muscle weakness    SKIN: negative for itching or rashes.     BEHAVIOR/PSYCH:  negative for poor appetite, increased appetite, decreased sleep and poor concentration    All other systems negative      PHYSICAL EXAM:    VITALS:  BP (!) 124/90   Pulse 67   Temp 98.7 °F (37.1 °C) (Skin)   Resp 14   Ht 5' 9\" (1.753 m)   Wt 205 lb (93 kg)   SpO2 96%   BMI 30.27 kg/m²     CONSTITUTIONAL:  awake, alert, cooperative, no apparent distress, and appears stated age    EYES: PERRLA, EOMI    LUNGS:  No increased work of breathing, no audible wheezing    CARDIOVASCULAR:  regular rate and rhythm    ABDOMEN:  Soft non tender non distended     EXTREMITIES: no signs of clubbing or cyanosis. MUSCULOSKELETAL: negative for flaccid muscle tone or spastic movements. SKIN: gross examination reveals no signs of rashes, or diaphoresis. NEURO: Cranial nerves II-XII grossly intact. No signs of agitated mood. Assessment/Plan:    Neck and arm pain for JORGE C6-7.

## 2023-02-09 NOTE — DISCHARGE INSTRUCTIONS
[facility]) Pain Management Department  [phone]   Post-Pain Block/ Radiofrequency Home Going Instructions    1-Go home, rest for the remainder of the day  2-Please do not lift over 20 pounds the day of the injection  3-If you received sedation No: alcohol, driving, operating lawn mowers, plows, tractors or other dangerous equipment until next morning. Do not make important decisions or sign legal documents for 24 hours. You may experience light headedness, dizziness, nausea or sleepiness after sedation. Do not stay alone. A responsible adult must be with you for 24 hours. You could be nauseated from the medications you have received. Your IV site may be sore and bruised. 4-No dietary restrictions     5-Resume all medications the same day, blood thinners to be resumed 24 hours after injection    6-Keep the surgical site clean and dry, you may shower the next morning and remove the      dressing. 7- No sitz baths, tub baths or hot tubs/swimming for 24 hours. 8- If you have any pain at the injection site(s), application of an ice pack to the area should be       helpful, 20 minutes on/20 minutes off for next 48 hours. 9- [facility] at if you develop. Fever greater than 100.4 F  Have bleeding or drainage from the puncture site  Have progressive Leg/arm numbness and or weakness  Loss of control of bowel and or bladder (wet/soil yourself)  Severe headache with inability to lift head  10-You may return to work the next day                Nausea and Vomiting After Surgery: Care Instructions  Your Care Instructions     After you've had surgery, you may feel sick to your stomach (nauseated) or you may vomit. Sometimes anesthesia can make you feel sick. It's a common side effect and often doesn't last long. Pain also can make you feel sick or vomit. After the anesthesia wears off, you may feel pain from the incision (cut). That pain could then upset your stomach.  Taking pain medicine can also make you feel sick to your stomach. Whatever the cause, you may get medicine that can help. There are also some things you can do at home to prevent nausea and feel better. The doctor has checked you carefully, but problems can develop later. If you notice any problems or new symptoms, get medical treatment right away. Follow-up care is a key part of your treatment and safety. Be sure to make and go to all appointments, and call your doctor if you are having problems. It's also a good idea to know your test results and keep a list of the medicines you take. How can you care for yourself at home? Be safe with medicines. Read and follow all instructions on the label. If the doctor gave you a prescription medicine for pain, take it as prescribed. If you are not taking a prescription pain medicine, ask your doctor if you can take an over-the-counter medicine. Take your pain medicine as soon as you have pain. It works better if you take it before the pain gets bad. Call your doctor if you have any problems with your medicine. Rest in bed until you feel better. To prevent dehydration, drink plenty of fluids. Choose water and other clear liquids until you feel better. If you have kidney, heart, or liver disease and have to limit fluids, talk with your doctor before you increase the amount of fluids you drink. When you are able to eat, try clear soups, mild foods, and liquids until all symptoms are gone for 12 to 48 hours. Other good choices include dry toast, crackers, cooked cereal, and gelatin dessert, such as Jell-O. Do not smoke. Smoking and being around smoke can make nausea worse. If you need help quitting, talk to your doctor about stop-smoking programs and medicines. These can increase your chances of quitting for good. When should you call for help? Call 911  anytime you think you may need emergency care. For example, call if:    You passed out (lost consciousness).    Call your doctor now or seek immediate medical care if:    You have new or worse nausea or vomiting. You are too sick to your stomach to drink any fluids. You cannot keep down fluids. You have symptoms of dehydration, such as:  Dry eyes and a dry mouth. Passing only a little urine. Feeling thirstier than usual.     Your pain medicine is not helping. You are dizzy or lightheaded, or you feel like you may faint. Watch closely for changes in your health, and be sure to contact your doctor if:    You do not get better as expected. Current as of: March 9, 2022               Content Version: 13.5  © 2006-2022 [employer]. Care instructions adapted under license by [facility] [place] ([facility]). If you have questions about a medical condition or this instruction, always ask your healthcare professional. [address] any warranty or liability for your use of this information. Infection After Surgery: Care Instructions  Overview  After surgery, an infection is always possible. It doesn't mean that the surgery didn't go well. Because an infection can be serious, your doctor has taken steps to manage it. Your doctor checked the infection and cleaned it if necessary. Your doctor may have made an opening in the area so that the pus can drain out. You may have gauze in the cut so that the area will stay open and keep draining. You may need antibiotics. You will need to follow up with your doctor to make sure the infection has gone away. Follow-up care is a key part of your treatment and safety. Be sure to make and go to all appointments, and call your doctor if you are having problems. It's also a good idea to know your test results and keep a list of the medicines you take. How can you care for yourself at home? Make sure your surgeon knows about the infection, especially if you saw another doctor about your symptoms. If your doctor prescribed antibiotics, take them as directed.  Do not stop taking them just because you feel better. You need to take the full course of antibiotics. Ask your doctor if you can take an over-the-counter pain medicine, such as acetaminophen (Tylenol), ibuprofen (Advil, Motrin), or naproxen (Aleve). Be safe with medicines. Read and follow all instructions on the label. Do not take two or more pain medicines at the same time unless the doctor told you to. Many pain medicines have acetaminophen, which is Tylenol. Too much acetaminophen (Tylenol) can be harmful. Prop up the area on a pillow anytime you sit or lie down during the next 3 days. Try to keep it above the level of your heart. This will help reduce swelling. Keep the skin clean and dry. You may have a bandage over the cut (incision). A bandage helps the incision heal and protects it. Your doctor will tell you how to take care of this. Keep it clean and dry. You may have drainage from the wound. If your doctor told you how to care for your incision, follow your doctor's instructions. If you did not get instructions, follow this general advice:  Wash around the incision with clean water 2 times a day. Don't use hydrogen peroxide or alcohol, which can slow healing. When should you call for help? Call your doctor now or seek immediate medical care if:    You have signs that your infection is getting worse, such as: Increased pain, swelling, warmth, or redness in the area. Red streaks leading from the area. Pus draining from the wound. A new or higher fever. Watch closely for changes in your health, and be sure to contact your doctor if you have any problems. Where can you learn more? Go to http://www.woods.com/ and enter C340 to learn more about \"Infection After Surgery: Care Instructions. \"  Current as of: January 20, 2022               Content Version: 13.5  © 3695-0014 [employer]. Care instructions adapted under license by [facility] ([facility]).  If you have questions about a medical condition or this instruction, always ask your healthcare professional. [place] [address] any warranty or liability for your use of this information.

## 2023-02-09 NOTE — OP NOTE
2023    Patient: Rebecca Corona  :  1965  Age:  63 y.o. Sex:  male     PRE-PROCEDURE DIAGNOSIS: Cervical degenerative disc disease, cervical radicular pain. POST-PROCEDURE DIAGNOSIS: Same. PROCEDURE: Fluoroscopic guided cervical epidural steroid injection, #1 at C6-7 level. SURGEON: ANITA Salter M.D. ANESTHESIA: MAC    ESTIMATED BLOOD LOSS: None.  ______________________________________________________________________  BRIEF HISTORY:  Rebecca Corona comes in today for the first  therapeutic cervical epidural injection under fluoroscopic guidance. The potential complications of this procedure were discussed with the him again today. He has elected to undergo the aforementioned procedure. Trever complete History & Physical examination were reviewed in depth, a copy of which is in the chart. DESCRIPTION OF PROCEDURE:    After confirming written and informed consent, a time-out was performed and Trever name and date of birth, the procedure to be performed as well as the plan for the location of the needle insertion were confirmed. The patient was brought into the procedure room and placed in the prone position with the head flexed midline on the fluoroscopy table. A pillow was placed under the patient's head to increase cervical interlaminar space. Standard monitors were placed, and vital signs were observed throughout the procedure. The area was prepped with chloraprep and the C6-7 interspace was marked under fluoroscopy. The skin and subcutaneous tissues at the above level were anesthestized with 0.5% lidocaine. An # 18 gauge 3 1/2 tuohy epidural needle was inserted and advanced toward the inferior lamina until bony contact was made. The needle was then advanced superiorly toward epidural space . From this point on hanging drop/loss of resistance technique with 5 cc glass syringe was used to confirm entrance of the needle into the epidural space under intermittent lateral fluoroscopy. Once in the epidural space , negative aspiration for blood and CSF was confirmed . Needle tip placement was confirmed by visualizing epidural spread of 0.5 ml of omnipaque 240 visualized in both AP and lateral live fluoroscopic views. Then after negative aspiration, a solution of 0.5 % Lidocaine 1 ml and 80 mg DepoMedrol was easily injected. The needle was gently removed intact. The patient neck was cleaned and a Band-Aid was placed over the needle insertion point. Disposition the patient tolerated the procedure well and there were no complications . Vital signs remained stable throughout the procedure. The patient was escorted to the recovery area where they remained until discharge and written discharge instructions for the procedure were given. Plan: Madhavi Aguilar will return to our pain management center as scheduled.      Lisa Falk MD

## 2023-02-14 ENCOUNTER — OFFICE VISIT (OUTPATIENT)
Dept: FAMILY MEDICINE CLINIC | Age: 58
End: 2023-02-14
Payer: COMMERCIAL

## 2023-02-14 VITALS
OXYGEN SATURATION: 98 % | HEIGHT: 69 IN | WEIGHT: 212 LBS | HEART RATE: 84 BPM | SYSTOLIC BLOOD PRESSURE: 138 MMHG | BODY MASS INDEX: 31.4 KG/M2 | DIASTOLIC BLOOD PRESSURE: 88 MMHG | RESPIRATION RATE: 20 BRPM

## 2023-02-14 DIAGNOSIS — Z99.89 OSA ON CPAP: Primary | ICD-10-CM

## 2023-02-14 DIAGNOSIS — E78.2 MIXED HYPERLIPIDEMIA: ICD-10-CM

## 2023-02-14 DIAGNOSIS — G47.33 OSA ON CPAP: Primary | ICD-10-CM

## 2023-02-14 PROCEDURE — 99214 OFFICE O/P EST MOD 30 MIN: CPT | Performed by: FAMILY MEDICINE

## 2023-02-14 RX ORDER — SIMVASTATIN 20 MG
TABLET ORAL
Qty: 90 TABLET | Refills: 1 | Status: SHIPPED | OUTPATIENT
Start: 2023-02-14

## 2023-02-14 RX ORDER — DULOXETIN HYDROCHLORIDE 30 MG/1
30 CAPSULE, DELAYED RELEASE ORAL DAILY
COMMUNITY

## 2023-02-14 RX ORDER — DICLOFENAC SODIUM 75 MG/1
75 TABLET, DELAYED RELEASE ORAL 2 TIMES DAILY
Qty: 180 TABLET | Refills: 1 | Status: SHIPPED | OUTPATIENT
Start: 2023-02-14

## 2023-02-14 RX ORDER — TIZANIDINE 4 MG/1
TABLET ORAL
Qty: 180 TABLET | Refills: 1 | Status: SHIPPED | OUTPATIENT
Start: 2023-02-14

## 2023-02-14 SDOH — ECONOMIC STABILITY: FOOD INSECURITY: WITHIN THE PAST 12 MONTHS, YOU WORRIED THAT YOUR FOOD WOULD RUN OUT BEFORE YOU GOT MONEY TO BUY MORE.: PATIENT DECLINED

## 2023-02-14 SDOH — ECONOMIC STABILITY: HOUSING INSECURITY
IN THE LAST 12 MONTHS, WAS THERE A TIME WHEN YOU DID NOT HAVE A STEADY PLACE TO SLEEP OR SLEPT IN A SHELTER (INCLUDING NOW)?: PATIENT REFUSED

## 2023-02-14 SDOH — ECONOMIC STABILITY: INCOME INSECURITY: HOW HARD IS IT FOR YOU TO PAY FOR THE VERY BASICS LIKE FOOD, HOUSING, MEDICAL CARE, AND HEATING?: PATIENT DECLINED

## 2023-02-14 SDOH — ECONOMIC STABILITY: FOOD INSECURITY: WITHIN THE PAST 12 MONTHS, THE FOOD YOU BOUGHT JUST DIDN'T LAST AND YOU DIDN'T HAVE MONEY TO GET MORE.: PATIENT DECLINED

## 2023-02-14 ASSESSMENT — PATIENT HEALTH QUESTIONNAIRE - PHQ9
SUM OF ALL RESPONSES TO PHQ QUESTIONS 1-9: 0
SUM OF ALL RESPONSES TO PHQ9 QUESTIONS 1 & 2: 0
1. LITTLE INTEREST OR PLEASURE IN DOING THINGS: 0
SUM OF ALL RESPONSES TO PHQ QUESTIONS 1-9: 0
2. FEELING DOWN, DEPRESSED OR HOPELESS: 0

## 2023-02-14 ASSESSMENT — ENCOUNTER SYMPTOMS
DIARRHEA: 0
SHORTNESS OF BREATH: 0
NAUSEA: 0
VOMITING: 0
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
## 2023-03-31 DIAGNOSIS — E78.2 MIXED HYPERLIPIDEMIA: ICD-10-CM

## 2023-03-31 DIAGNOSIS — Z00.00 WELLNESS EXAMINATION: ICD-10-CM

## 2023-03-31 DIAGNOSIS — Z12.5 PROSTATE CANCER SCREENING: ICD-10-CM

## 2023-03-31 LAB
ALBUMIN SERPL-MCNC: 4.4 G/DL (ref 3.5–5.2)
ALP SERPL-CCNC: 61 U/L (ref 40–129)
ALT SERPL-CCNC: 44 U/L (ref 0–40)
ANION GAP SERPL CALCULATED.3IONS-SCNC: 10 MMOL/L (ref 7–16)
AST SERPL-CCNC: 39 U/L (ref 0–39)
BILIRUB SERPL-MCNC: 0.5 MG/DL (ref 0–1.2)
BUN SERPL-MCNC: 14 MG/DL (ref 6–20)
CALCIUM SERPL-MCNC: 9.5 MG/DL (ref 8.6–10.2)
CHLORIDE SERPL-SCNC: 105 MMOL/L (ref 98–107)
CHOLESTEROL, TOTAL: 260 MG/DL (ref 0–199)
CO2 SERPL-SCNC: 26 MMOL/L (ref 22–29)
CREAT SERPL-MCNC: 1.1 MG/DL (ref 0.7–1.2)
ERYTHROCYTE [DISTWIDTH] IN BLOOD BY AUTOMATED COUNT: 12.2 FL (ref 11.5–15)
GLUCOSE SERPL-MCNC: 98 MG/DL (ref 74–99)
HCT VFR BLD AUTO: 50.8 % (ref 37–54)
HDLC SERPL-MCNC: 38 MG/DL
HGB BLD-MCNC: 16.2 G/DL (ref 12.5–16.5)
LDLC SERPL CALC-MCNC: 198 MG/DL (ref 0–99)
MCH RBC QN AUTO: 29.3 PG (ref 26–35)
MCHC RBC AUTO-ENTMCNC: 31.9 % (ref 32–34.5)
MCV RBC AUTO: 91.9 FL (ref 80–99.9)
PLATELET # BLD AUTO: 134 E9/L (ref 130–450)
PMV BLD AUTO: 12.7 FL (ref 7–12)
POTASSIUM SERPL-SCNC: 5 MMOL/L (ref 3.5–5)
PROT SERPL-MCNC: 7.5 G/DL (ref 6.4–8.3)
PSA SERPL-MCNC: 0.81 NG/ML (ref 0–4)
RBC # BLD AUTO: 5.53 E12/L (ref 3.8–5.8)
SODIUM SERPL-SCNC: 141 MMOL/L (ref 132–146)
TRIGL SERPL-MCNC: 122 MG/DL (ref 0–149)
TSH SERPL-MCNC: 1.37 UIU/ML (ref 0.27–4.2)
VLDLC SERPL CALC-MCNC: 24 MG/DL
WBC # BLD: 3.7 E9/L (ref 4.5–11.5)

## 2023-04-06 DIAGNOSIS — E78.2 MIXED HYPERLIPIDEMIA: ICD-10-CM

## 2023-04-06 RX ORDER — SIMVASTATIN 40 MG
40 TABLET ORAL NIGHTLY
Qty: 90 TABLET | Refills: 1 | Status: SHIPPED | OUTPATIENT
Start: 2023-04-06

## 2023-05-17 ENCOUNTER — OFFICE VISIT (OUTPATIENT)
Dept: ORTHOPEDIC SURGERY | Age: 58
End: 2023-05-17

## 2023-05-17 DIAGNOSIS — M17.0 PRIMARY OSTEOARTHRITIS OF BOTH KNEES: Primary | ICD-10-CM

## 2023-05-17 RX ORDER — TRIAMCINOLONE ACETONIDE 40 MG/ML
40 INJECTION, SUSPENSION INTRA-ARTICULAR; INTRAMUSCULAR ONCE
Status: COMPLETED | OUTPATIENT
Start: 2023-05-17 | End: 2023-05-17

## 2023-05-17 RX ORDER — BUPIVACAINE HYDROCHLORIDE 2.5 MG/ML
2 INJECTION, SOLUTION INFILTRATION; PERINEURAL ONCE
Status: COMPLETED | OUTPATIENT
Start: 2023-05-17 | End: 2023-05-17

## 2023-05-17 RX ADMIN — TRIAMCINOLONE ACETONIDE 40 MG: 40 INJECTION, SUSPENSION INTRA-ARTICULAR; INTRAMUSCULAR at 11:51

## 2023-05-17 RX ADMIN — BUPIVACAINE HYDROCHLORIDE 5 MG: 2.5 INJECTION, SOLUTION INFILTRATION; PERINEURAL at 11:51

## 2023-05-17 RX ADMIN — BUPIVACAINE HYDROCHLORIDE 5 MG: 2.5 INJECTION, SOLUTION INFILTRATION; PERINEURAL at 11:50

## 2023-05-17 NOTE — PROGRESS NOTES
[facility]   ORTHOPAEDIC SURGERY AND SPORTS MEDICINE  DATE OF VISIT: 05/17/23  Follow Up Visit     CHIEF COMPLAINT:   Chief Complaint   Patient presents with    Follow-up     Bilateral knee patellofemoral osteoarthritis -- last injections 7/11/2022. Patient reports good improvement with injections, over the last 2 months he had increasing pain. States last week he stepped off a curb and fell onto the R knee. HPI:    Dru Velasco is a 63y.o. year old male who presented to the office today for follow up of right, previously evaluated on 7/11/2022. Previous treatment has included intra-articular bilateral knee cortisone injections. He reports symptoms are improved. The patient has responded to the treatment. He reports pain returned just over 2 months ago. Reports he did trip and fall off a curb about 1 week ago landing directly his left knee. Reports minor scrape to left knee. He is walking without difficulty unassisted. REVIEW OF SYSTEMS:     Constitutional:  Negative for weight loss, fevers, chills, fatigue  Cardiovascular: Negative for chest pain, palpitations  Pulmonary: Negative for shortness of breath, labored breathing, cough  GI: negative for abdominal pain, nausea, vomiting   MSK: per HPI  Skin: negative for rash, open wounds    All other systems reviewed and are negative       Physical Exam:     No data recorded    General: Alert and oriented x3, no acute distress  Cardiovascular/pulmonary: No labored breathing, peripheral perfusion intact  Musculoskeletal:    Bilateral knee exam range of motion full, valgus/varus stress stable at 0 and 3 degrees. Patella tracked midline with positive patellofemoral crepitus. Intact drawer test.  Right knee displayed positive Lachman test.  Negative swelling, deformity, palpable effusion. Positive medial anterior joint line tenderness. Controlled Substances Monitoring:      Imaging:  No new imaging obtained today.   Previous weightbearing x-rays of

## 2023-05-23 ENCOUNTER — OFFICE VISIT (OUTPATIENT)
Dept: FAMILY MEDICINE CLINIC | Age: 58
End: 2023-05-23
Payer: COMMERCIAL

## 2023-05-23 VITALS
BODY MASS INDEX: 31.7 KG/M2 | HEART RATE: 58 BPM | RESPIRATION RATE: 20 BRPM | HEIGHT: 69 IN | DIASTOLIC BLOOD PRESSURE: 88 MMHG | SYSTOLIC BLOOD PRESSURE: 130 MMHG | OXYGEN SATURATION: 99 % | WEIGHT: 214 LBS

## 2023-05-23 DIAGNOSIS — E78.2 MIXED HYPERLIPIDEMIA: ICD-10-CM

## 2023-05-23 PROCEDURE — 99214 OFFICE O/P EST MOD 30 MIN: CPT | Performed by: FAMILY MEDICINE

## 2023-05-23 RX ORDER — SIMVASTATIN 20 MG
TABLET ORAL
Qty: 90 TABLET | Refills: 1 | Status: SHIPPED | OUTPATIENT
Start: 2023-05-23

## 2023-05-23 ASSESSMENT — ENCOUNTER SYMPTOMS
SHORTNESS OF BREATH: 0
NAUSEA: 0
VOMITING: 0
DIARRHEA: 0

## 2023-05-23 NOTE — PROGRESS NOTES
Chief Complaint   Patient presents with    Hyperlipidemia       Patient is here for follow up for hyperlipidemia    Hyperlipidemia:  Patient is here for follow up chronic hyperlipidemia. This is not generally controlled on current medication regimen. BP today is 130/88. Takes meds as directed and tolerates them well. Most recent labs reviewed with patient, including CMP, CBC, TSH, and lipid panel, and are remarkable for hyperlipidemia. Patient states he had not been taking his simvastatin for a few months prior to getting last set of blood work done. Could not tolerate 40 mg simvastatin nightly, so has been taking it every other night for several weeks. No symptoms from htn standpoint per ROS. Patientis not compliant with lifestyle modifications. Patient does not smoke. Comorbid conditions include chronic neck pain. Patient's past medical, surgical, social and/or family history reviewed, updated inchart, and are non-contributory (unless otherwise stated). Medications and allergies also reviewed and updated in chart. Current Outpatient Medications   Medication Sig Dispense Refill    simvastatin (ZOCOR) 20 MG tablet TAKE ONE TABLET BY MOUTH NIGHTLY 90 tablet 1    tiZANidine (ZANAFLEX) 4 MG tablet TAKE ONE TABLET BY MOUTH TWICE DAILY AS NEEDED for muscle spasms 180 tablet 1    diclofenac (VOLTAREN) 75 MG EC tablet Take 1 tablet by mouth 2 times daily 180 tablet 1    DULoxetine (CYMBALTA) 30 MG extended release capsule Take 1 capsule by mouth daily      DULoxetine (CYMBALTA) 60 MG extended release capsule Take 1 capsule by mouth daily Last taken 1/5/23- takes 90 mg      dorzolamide (TRUSOPT) 2 % ophthalmic solution Place 2 drops into both eyes in the morning and 2 drops in the evening.       brimonidine (ALPHAGAN) 0.2 % ophthalmic solution Place 1 drop into both eyes in the morning and at bedtime  3    doxepin (SINEQUAN) 50 MG capsule Take 1 capsule by mouth as needed Last taken 1/5/23

## 2023-09-12 DIAGNOSIS — E78.2 MIXED HYPERLIPIDEMIA: ICD-10-CM

## 2023-09-13 LAB
CHOLESTEROL: 171 MG/DL
HDLC SERPL-MCNC: 39 MG/DL
LDL CHOLESTEROL: 120 MG/DL
TRIGL SERPL-MCNC: 60 MG/DL
VLDLC SERPL CALC-MCNC: 12 MG/DL

## 2023-11-27 ENCOUNTER — OFFICE VISIT (OUTPATIENT)
Dept: FAMILY MEDICINE CLINIC | Age: 58
End: 2023-11-27
Payer: COMMERCIAL

## 2023-11-27 VITALS
DIASTOLIC BLOOD PRESSURE: 84 MMHG | BODY MASS INDEX: 31.55 KG/M2 | SYSTOLIC BLOOD PRESSURE: 132 MMHG | HEART RATE: 77 BPM | RESPIRATION RATE: 20 BRPM | HEIGHT: 69 IN | WEIGHT: 213 LBS | OXYGEN SATURATION: 98 %

## 2023-11-27 DIAGNOSIS — Z12.5 PROSTATE CANCER SCREENING: ICD-10-CM

## 2023-11-27 DIAGNOSIS — Z12.11 COLON CANCER SCREENING: ICD-10-CM

## 2023-11-27 DIAGNOSIS — Z00.00 WELLNESS EXAMINATION: Primary | ICD-10-CM

## 2023-11-27 DIAGNOSIS — E78.2 MIXED HYPERLIPIDEMIA: ICD-10-CM

## 2023-11-27 PROCEDURE — 99396 PREV VISIT EST AGE 40-64: CPT | Performed by: FAMILY MEDICINE

## 2023-11-27 RX ORDER — SIMVASTATIN 20 MG
TABLET ORAL
Qty: 90 TABLET | Refills: 1 | Status: SHIPPED | OUTPATIENT
Start: 2023-11-27

## 2023-11-27 RX ORDER — TIZANIDINE 4 MG/1
TABLET ORAL
Qty: 180 TABLET | Refills: 1 | Status: SHIPPED | OUTPATIENT
Start: 2023-11-27

## 2023-11-27 ASSESSMENT — ENCOUNTER SYMPTOMS
VOMITING: 0
SHORTNESS OF BREATH: 0
NAUSEA: 0
DIARRHEA: 0

## 2023-11-27 NOTE — PROGRESS NOTES
Annual exam:  Patient is here for routine yearly physical/preventative visit. Patient has no complaints or concerns today. Patient is  generally healthy. Chronic medical problems include hyperlipidemia. These are generally controlled. /84 today. Most recent labs, including CMP, CBC, TSH, and lipid panel, reviewed with patient and  are not remarkable. Health maintenancereviewed with patient and is not up to date. Patient does not smoke. Patient does drink alcohol. Patient  does not use drugs. Overall doing well. Patient's pastmedical, surgical, social and/or family history reviewed, updated in chart, and are non-contributory (unless otherwise stated). Medications and allergies also reviewed and updated in chart. Current Outpatient Medications   Medication Sig Dispense Refill    tiZANidine (ZANAFLEX) 4 MG tablet TAKE ONE TABLET BY MOUTH TWICE DAILY AS NEEDED for muscle spasms 180 tablet 1    simvastatin (ZOCOR) 20 MG tablet TAKE ONE TABLET BY MOUTH NIGHTLY 90 tablet 1    DULoxetine (CYMBALTA) 30 MG extended release capsule Take 1 capsule by mouth daily      DULoxetine (CYMBALTA) 60 MG extended release capsule Take 1 capsule by mouth daily Last taken 1/5/23- takes 90 mg      dorzolamide (TRUSOPT) 2 % ophthalmic solution Place 2 drops into both eyes in the morning and 2 drops in the evening. brimonidine (ALPHAGAN) 0.2 % ophthalmic solution Place 1 drop into both eyes in the morning and at bedtime  3    doxepin (SINEQUAN) 50 MG capsule Take 1 capsule by mouth as needed Last taken 1/5/23      latanoprost (XALATAN) 0.005 % ophthalmic solution Place 1 drop into both eyes nightly      diclofenac (VOLTAREN) 75 MG EC tablet Take 1 tablet by mouth 2 times daily (Patient not taking: Reported on 11/27/2023) 180 tablet 1    pregabalin (LYRICA) 225 MG capsule Take 1 capsule by mouth 2 times daily for 30 days.  (Patient taking differently: No sig reported) 60 capsule 2    albuterol sulfate HFA

## 2023-12-16 LAB — NONINV COLON CA DNA+OCC BLD SCRN STL QL: NEGATIVE

## 2024-05-13 DIAGNOSIS — E78.2 MIXED HYPERLIPIDEMIA: ICD-10-CM

## 2024-05-13 DIAGNOSIS — Z12.5 PROSTATE CANCER SCREENING: ICD-10-CM

## 2024-05-13 LAB
ALBUMIN: 4.4 G/DL (ref 3.5–5.2)
ALP BLD-CCNC: 67 U/L (ref 40–129)
ALT SERPL-CCNC: 49 U/L (ref 0–40)
ANION GAP SERPL CALCULATED.3IONS-SCNC: 14 MMOL/L (ref 7–16)
AST SERPL-CCNC: 43 U/L (ref 0–39)
BILIRUB SERPL-MCNC: 0.5 MG/DL (ref 0–1.2)
BUN BLDV-MCNC: 13 MG/DL (ref 6–20)
CALCIUM SERPL-MCNC: 9.5 MG/DL (ref 8.6–10.2)
CHLORIDE BLD-SCNC: 106 MMOL/L (ref 98–107)
CHOLESTEROL, TOTAL: 219 MG/DL
CO2: 21 MMOL/L (ref 22–29)
CREAT SERPL-MCNC: 1.1 MG/DL (ref 0.7–1.2)
GFR, ESTIMATED: 75 ML/MIN/1.73M2
GLUCOSE BLD-MCNC: 99 MG/DL (ref 74–99)
HCT VFR BLD CALC: 50.3 % (ref 37–54)
HDLC SERPL-MCNC: 37 MG/DL
HEMOGLOBIN: 16.4 G/DL (ref 12.5–16.5)
LDL CHOLESTEROL: 165 MG/DL
MCH RBC QN AUTO: 29.4 PG (ref 26–35)
MCHC RBC AUTO-ENTMCNC: 32.6 G/DL (ref 32–34.5)
MCV RBC AUTO: 90.3 FL (ref 80–99.9)
PDW BLD-RTO: 12.5 % (ref 11.5–15)
PLATELET CONFIRMATION: NORMAL
PLATELET, FLUORESCENCE: 134 K/UL (ref 130–450)
PMV BLD AUTO: 13.2 FL (ref 7–12)
POTASSIUM SERPL-SCNC: 4.7 MMOL/L (ref 3.5–5)
PROSTATE SPECIFIC ANTIGEN: 0.87 NG/ML (ref 0–4)
RBC # BLD: 5.57 M/UL (ref 3.8–5.8)
SODIUM BLD-SCNC: 141 MMOL/L (ref 132–146)
TOTAL PROTEIN: 7.7 G/DL (ref 6.4–8.3)
TRIGL SERPL-MCNC: 87 MG/DL
VLDLC SERPL CALC-MCNC: 17 MG/DL
WBC # BLD: 4 K/UL (ref 4.5–11.5)

## 2024-05-17 ENCOUNTER — OFFICE VISIT (OUTPATIENT)
Dept: ORTHOPEDIC SURGERY | Age: 59
End: 2024-05-17

## 2024-05-17 VITALS — WEIGHT: 213 LBS | HEIGHT: 69 IN | BODY MASS INDEX: 31.55 KG/M2

## 2024-05-17 DIAGNOSIS — M17.0 PRIMARY OSTEOARTHRITIS OF BOTH KNEES: Primary | ICD-10-CM

## 2024-05-17 RX ORDER — TRIAMCINOLONE ACETONIDE 40 MG/ML
40 INJECTION, SUSPENSION INTRA-ARTICULAR; INTRAMUSCULAR ONCE
Status: COMPLETED | OUTPATIENT
Start: 2024-05-17 | End: 2024-05-17

## 2024-05-17 RX ORDER — BUPIVACAINE HYDROCHLORIDE 2.5 MG/ML
2 INJECTION, SOLUTION INFILTRATION; PERINEURAL ONCE
Status: COMPLETED | OUTPATIENT
Start: 2024-05-17 | End: 2024-05-17

## 2024-05-17 RX ADMIN — BUPIVACAINE HYDROCHLORIDE 5 MG: 2.5 INJECTION, SOLUTION INFILTRATION; PERINEURAL at 10:18

## 2024-05-17 RX ADMIN — TRIAMCINOLONE ACETONIDE 40 MG: 40 INJECTION, SUSPENSION INTRA-ARTICULAR; INTRAMUSCULAR at 10:19

## 2024-05-17 RX ADMIN — TRIAMCINOLONE ACETONIDE 40 MG: 40 INJECTION, SUSPENSION INTRA-ARTICULAR; INTRAMUSCULAR at 10:20

## 2024-05-17 NOTE — PROGRESS NOTES
[facility]   ORTHOPAEDIC SURGERY AND SPORTS MEDICINE  DATE OF VISIT: 05/17/24  Follow Up Knee Visit     CHIEF COMPLAINT:   Chief Complaint   Patient presents with    Injections     B/L knee injections.  Patient last had injections on 5/17/2023.        HPI:    Randal Bravo is a 59 y.o. year old male who presented to the office today for follow up of bilateral knee pain, previously evaluated on 5/17/2023. Previous treatment has included bilateral knee corticosteroid injection. He reports symptoms improved.  He reports return of pain about 2 to 3 months ago.  States the pain has been worsening more recently. The patient has responded to the treatment.      REVIEW OF SYSTEMS:     General: Negative Fever, chills, fatigue   Cardiovascular: Negative chest pain, palpitations  Respiratory: Equal chest expansion, negative shortness of breath   Skin: Negative rash, open wounds  Psych: Normal affect, mood stable  Neurologic: sensation grossly intact in extremities   Musculoskeletal: See HPI      Physical Exam:     Height: 1.753 m (5' 9\"), Weight - Scale: 96.6 kg (213 lb) (per pt.)    General: Alert and oriented x3, no acute distress  Cardiovascular/pulmonary: No labored breathing, peripheral perfusion intact  Musculoskeletal:    Bilateral Knee:    negative swelling/deformity/effusion  Range of motion is 0 to 125.     Patella is stable tracking midline, positive patellofemoral crepitus  There is no laxity with varus/valgus stress at 0 and 30 degrees of flexion.    There is no tenderness to palpation along the medial joint line.    There is no tenderness to palpation along the lateral joint line      Controlled Substances Monitoring:      Imaging:  Previous imaging reviewed displaying degenerative changes present in both knees most prominent to the patellofemoral compartment    Procedure Note: Knee Cortisone injection     The bilateral knees were identified as the injection site. The risk and benefits of a cortisone injection

## 2024-06-05 ENCOUNTER — TELEPHONE (OUTPATIENT)
Dept: FAMILY MEDICINE CLINIC | Age: 59
End: 2024-06-05

## 2024-06-05 DIAGNOSIS — G89.29 CHRONIC SHOULDER PAIN, UNSPECIFIED LATERALITY: ICD-10-CM

## 2024-06-05 DIAGNOSIS — G89.4 CHRONIC PAIN SYNDROME: ICD-10-CM

## 2024-06-05 DIAGNOSIS — M25.519 CHRONIC SHOULDER PAIN, UNSPECIFIED LATERALITY: ICD-10-CM

## 2024-06-05 DIAGNOSIS — M50.90 CERVICAL DISC DISORDER: Primary | ICD-10-CM

## 2024-06-05 NOTE — TELEPHONE ENCOUNTER
Pt called stating he needs a referral to Dr Louis since it has been more then 2 years since he saw her. This is for neck and shoulder pain.  Last seen 11/27/2023  Next appt  7/23/2024    Requested Prescriptions      No prescriptions requested or ordered in this encounter

## 2024-06-18 ENCOUNTER — TELEPHONE (OUTPATIENT)
Dept: FAMILY MEDICINE CLINIC | Age: 59
End: 2024-06-18

## 2024-06-18 NOTE — TELEPHONE ENCOUNTER
----- Message from Meron Sampson MA sent at 6/18/2024  1:05 PM EDT -----  Regarding: Referral  Hi the referral needs to come from his Physician of record because they need to send a C-9 requesting Consult to Dr Louis.

## 2024-08-20 ENCOUNTER — OFFICE VISIT (OUTPATIENT)
Dept: FAMILY MEDICINE CLINIC | Age: 59
End: 2024-08-20
Payer: COMMERCIAL

## 2024-08-20 VITALS
WEIGHT: 226 LBS | BODY MASS INDEX: 33.47 KG/M2 | OXYGEN SATURATION: 98 % | HEIGHT: 69 IN | SYSTOLIC BLOOD PRESSURE: 136 MMHG | HEART RATE: 58 BPM | DIASTOLIC BLOOD PRESSURE: 88 MMHG | RESPIRATION RATE: 20 BRPM

## 2024-08-20 DIAGNOSIS — E78.2 MIXED HYPERLIPIDEMIA: Primary | ICD-10-CM

## 2024-08-20 PROCEDURE — 99214 OFFICE O/P EST MOD 30 MIN: CPT | Performed by: FAMILY MEDICINE

## 2024-08-20 RX ORDER — TIZANIDINE 4 MG/1
TABLET ORAL
Qty: 180 TABLET | Refills: 1 | Status: SHIPPED | OUTPATIENT
Start: 2024-08-20

## 2024-08-20 RX ORDER — ATROPINE SULFATE 10 MG/ML
SOLUTION/ DROPS OPHTHALMIC
COMMUNITY

## 2024-08-20 RX ORDER — PREGABALIN 225 MG/1
225 CAPSULE ORAL 2 TIMES DAILY
Qty: 60 CAPSULE | Refills: 2 | Status: CANCELLED | OUTPATIENT
Start: 2024-08-20 | End: 2024-09-19

## 2024-08-20 RX ORDER — NETARSUDIL AND LATANOPROST OPHTHALMIC SOLUTION, 0.02%/0.005% .2; .05 MG/ML; MG/ML
SOLUTION/ DROPS OPHTHALMIC; TOPICAL
COMMUNITY
Start: 2024-06-25

## 2024-08-20 RX ORDER — PREDNISOLONE ACETATE 10 MG/ML
SUSPENSION/ DROPS OPHTHALMIC
COMMUNITY

## 2024-08-20 RX ORDER — ROSUVASTATIN CALCIUM 5 MG/1
5 TABLET, COATED ORAL NIGHTLY
Qty: 30 TABLET | Refills: 3 | Status: SHIPPED | OUTPATIENT
Start: 2024-08-20

## 2024-08-20 SDOH — ECONOMIC STABILITY: FOOD INSECURITY: WITHIN THE PAST 12 MONTHS, YOU WORRIED THAT YOUR FOOD WOULD RUN OUT BEFORE YOU GOT MONEY TO BUY MORE.: NEVER TRUE

## 2024-08-20 SDOH — ECONOMIC STABILITY: INCOME INSECURITY: HOW HARD IS IT FOR YOU TO PAY FOR THE VERY BASICS LIKE FOOD, HOUSING, MEDICAL CARE, AND HEATING?: NOT HARD AT ALL

## 2024-08-20 SDOH — ECONOMIC STABILITY: FOOD INSECURITY: WITHIN THE PAST 12 MONTHS, THE FOOD YOU BOUGHT JUST DIDN'T LAST AND YOU DIDN'T HAVE MONEY TO GET MORE.: NEVER TRUE

## 2024-08-20 ASSESSMENT — PATIENT HEALTH QUESTIONNAIRE - PHQ9
SUM OF ALL RESPONSES TO PHQ QUESTIONS 1-9: 1
1. LITTLE INTEREST OR PLEASURE IN DOING THINGS: SEVERAL DAYS
SUM OF ALL RESPONSES TO PHQ9 QUESTIONS 1 & 2: 1
2. FEELING DOWN, DEPRESSED OR HOPELESS: NOT AT ALL
SUM OF ALL RESPONSES TO PHQ QUESTIONS 1-9: 1

## 2024-08-20 ASSESSMENT — ENCOUNTER SYMPTOMS
NAUSEA: 0
DIARRHEA: 0
VOMITING: 0
SHORTNESS OF BREATH: 0

## 2024-08-20 NOTE — PROGRESS NOTES
Chief Complaint   Patient presents with    Hyperlipidemia       Patient is here for follow up for hyperlipidemia    Hyperlipidemia:  Patient is here for follow up hyperlipidemia.  This is  generally controlled on current medication regimen.  BP today is 136/88.  Patient admits he has not been taking his Zocor nightly due to side effects.  Most recent labs reviewed with patient, including CMP, CBC, TSH, and lipid panel, and are remarkable for hyperlipidemia and elevated liver enzymes.  No symptoms from htn standpoint per ROS.  Patientis  compliant with lifestyle modifications.  Patient does not smoke.       Patient's past medical, surgical, social and/or family history reviewed, updated inchart, and are non-contributory (unless otherwise stated).  Medications and allergies also reviewed and updated in chart.      Current Outpatient Medications   Medication Sig Dispense Refill    atropine 1 % ophthalmic solution INSTILL 1 DROP 2 TIMES A DAY IN LEFT EYE AFTER SURGERY UNTIL GONE THEN STOP, NO REFILL      ROCKLATAN 0.02-0.005 % ophthalmic solution INSTILL 1 DROP INTO RIGHT EYE AT BEDTIME      prednisoLONE acetate (PRED FORTE) 1 % ophthalmic suspension INSTILL 1 BY OPHTHALMIC ROUTE EVERY 2 HOURS LEFT EYE AFTER SURGERY      tiZANidine (ZANAFLEX) 4 MG tablet TAKE ONE TABLET BY MOUTH TWICE DAILY AS NEEDED for muscle spasms 180 tablet 1    rosuvastatin (CRESTOR) 5 MG tablet Take 1 tablet by mouth nightly 30 tablet 3    diclofenac (VOLTAREN) 75 MG EC tablet Take 1 tablet by mouth 2 times daily 180 tablet 1    DULoxetine (CYMBALTA) 30 MG extended release capsule Take 1 capsule by mouth daily      DULoxetine (CYMBALTA) 60 MG extended release capsule Take 1 capsule by mouth daily Last taken 1/5/23- takes 90 mg      dorzolamide (TRUSOPT) 2 % ophthalmic solution Place 2 drops into both eyes in the morning and 2 drops in the evening.      brimonidine (ALPHAGAN) 0.2 % ophthalmic solution Place 1 drop into both eyes in the morning and

## 2024-12-10 ENCOUNTER — HOSPITAL ENCOUNTER (OUTPATIENT)
Age: 59
Discharge: HOME | End: 2024-12-10
Payer: COMMERCIAL

## 2024-12-10 DIAGNOSIS — E78.2 MIXED HYPERLIPIDEMIA: ICD-10-CM

## 2024-12-10 LAB
CHOLEST SERPL-MCNC: 225 MG/DL
HDLC SERPL-MCNC: 35 MG/DL
LDLC SERPL CALC-MCNC: 167 MG/DL
TRIGL SERPL-MCNC: 114 MG/DL
VLDLC SERPL CALC-MCNC: 23 MG/DL

## 2024-12-10 PROCEDURE — 80061 LIPID PANEL: CPT

## 2024-12-10 PROCEDURE — 36415 COLL VENOUS BLD VENIPUNCTURE: CPT

## 2025-03-05 ENCOUNTER — TELEPHONE (OUTPATIENT)
Dept: FAMILY MEDICINE CLINIC | Age: 60
End: 2025-03-05

## 2025-03-05 NOTE — TELEPHONE ENCOUNTER
Pt stated that he has chest congestion and a sore throat for the past week. Pt has a lot of mucus and is coughing it up. He is taking OTC Nyquil. I advised pt that he has not been seen since 8/20/24 and PCP will want to see him prior to calling in an antibiotic. Pt stated he was able to call in an antibiotic last year. Pt stated he is unable to come to the walk in clinic. Pt would like to see if PCP will sent in antibiotic.    Last seen 8/20/2024  Next appt Visit date not found

## 2025-03-06 RX ORDER — AMOXICILLIN 500 MG/1
500 CAPSULE ORAL 2 TIMES DAILY
Qty: 20 CAPSULE | Refills: 0 | Status: SHIPPED | OUTPATIENT
Start: 2025-03-06 | End: 2025-03-16

## 2025-06-30 ENCOUNTER — OFFICE VISIT (OUTPATIENT)
Dept: ORTHOPEDIC SURGERY | Age: 60
End: 2025-06-30

## 2025-06-30 VITALS
TEMPERATURE: 97.8 F | RESPIRATION RATE: 18 BRPM | OXYGEN SATURATION: 98 % | HEIGHT: 69 IN | BODY MASS INDEX: 33.47 KG/M2 | SYSTOLIC BLOOD PRESSURE: 144 MMHG | HEART RATE: 65 BPM | WEIGHT: 226 LBS | DIASTOLIC BLOOD PRESSURE: 95 MMHG

## 2025-06-30 DIAGNOSIS — M17.0 PRIMARY OSTEOARTHRITIS OF BOTH KNEES: Primary | ICD-10-CM

## 2025-06-30 RX ORDER — TRIAMCINOLONE ACETONIDE 40 MG/ML
40 INJECTION, SUSPENSION INTRA-ARTICULAR; INTRAMUSCULAR ONCE
Status: COMPLETED | OUTPATIENT
Start: 2025-06-30 | End: 2025-06-30

## 2025-06-30 RX ORDER — BUPIVACAINE HYDROCHLORIDE 2.5 MG/ML
2 INJECTION, SOLUTION INFILTRATION; PERINEURAL ONCE
Status: COMPLETED | OUTPATIENT
Start: 2025-06-30 | End: 2025-06-30

## 2025-06-30 RX ADMIN — BUPIVACAINE HYDROCHLORIDE 5 MG: 2.5 INJECTION, SOLUTION INFILTRATION; PERINEURAL at 15:26

## 2025-06-30 RX ADMIN — BUPIVACAINE HYDROCHLORIDE 5 MG: 2.5 INJECTION, SOLUTION INFILTRATION; PERINEURAL at 15:25

## 2025-06-30 RX ADMIN — TRIAMCINOLONE ACETONIDE 40 MG: 40 INJECTION, SUSPENSION INTRA-ARTICULAR; INTRAMUSCULAR at 15:27

## 2025-06-30 RX ADMIN — TRIAMCINOLONE ACETONIDE 40 MG: 40 INJECTION, SUSPENSION INTRA-ARTICULAR; INTRAMUSCULAR at 15:26

## 2025-06-30 NOTE — PROGRESS NOTES
[facility]  ORTHOPAEDICS    Follow Up Visit     CHIEF COMPLAINT:   Chief Complaint   Patient presents with    Knee Pain     Patient presents today for B/L knee pain, right worse than the left. H/o of last injection on 5/17/2024 which he thinks helped and lasted for roughly 9-10 months. Requesting repeat injection today.        Randal Bravo returns today for follow up visit regarding bilateral knee pain.     History of Present Illness  The patient is here for a follow-up visit for both of his knees.    He received injections in both knees in 05/2025, which provided relief for approximately 2 months. However, he is currently experiencing more discomfort in his right knee than the left, particularly when sitting down. He describes the pain as being located behind the knee and is uncertain about the presence of swelling. He also reports stiffness in the knee. He has a history of Osgood-Schlatter disease, but it is not causing him any current issues.      Physical Exam:     Height: 1.753 m (5' 9\"), Weight - Scale: 102.5 kg (226 lb), BP: (!) 144/95    General: Alert and oriented x3, no acute distress  Cardiovascular/pulmonary: No labored breathing, peripheral perfusion intact  Musculoskeletal:    Physical Exam  Musculoskeletal:  Right Knee: Pain behind the knee, stiffness, mild arthritis in the kneecap  Left Knee: Mild arthritis in the kneecap      Controlled Substances Monitoring:      Imaging:    Results  Imaging   - X-ray of both knees: 06/30/2025, A little bit of narrowing on the inside and some arthritis in the kneecap. The left knee appears slightly worse than the right.              Assesment/Plan:     Assessment & Plan  1. Bilateral knee pain:  The patient reports that the previous injections provided relief for about 2 months. He experiences more pain in the right knee, particularly behind the knee and when sitting down. Updated x-rays show mild arthritis under the kneecap and on the inside of the knee, with

## (undated) DEVICE — NEEDLE HYPO 25GA L1.5IN BLU POLYPR HUB S STL REG BVL STR

## (undated) DEVICE — GAUZE,SPONGE,4"X4",12PLY,STERILE,LF,2'S: Brand: MEDLINE

## (undated) DEVICE — NEEDLE HYPO 18GA L1.5IN PNK POLYPR HUB S STL THN WALL FILL

## (undated) DEVICE — 3M™ RED DOT™ MONITORING ELECTRODE WITH FOAM TAPE AND STICKY GEL 2560, 50/BAG, 20/CASE, 72/PLT: Brand: RED DOT™

## (undated) DEVICE — BANDAGE ADH W1XL3IN NAT FAB WVN FLX DURABLE N ADH PD SEAL

## (undated) DEVICE — TRAY EPI SGL DOSE 18GA NDL CUST AULTMAN HOSP

## (undated) DEVICE — 6 ML SYRINGE LUER-LOCK TIP: Brand: MONOJECT

## (undated) DEVICE — 12 ML SYRINGE,LUER-LOCK TIP: Brand: MONOJECT

## (undated) DEVICE — TOWEL,OR,DSP,ST,BLUE,STD,6/PK,12PK/CS: Brand: MEDLINE

## (undated) DEVICE — GLOVE ORANGE PI 7 1/2   MSG9075

## (undated) DEVICE — 3 ML SYRINGE LUER-LOCK TIP: Brand: MONOJECT

## (undated) DEVICE — Z DISCONTINUED APPLICATOR SURG PREP 0.35OZ 2% CHG 70% ISO ALC W/ HI LT

## (undated) DEVICE — NEEDLE HYPO 27GA L1.25IN GRY POLYPR HUB S STL REG BVL STR

## (undated) DEVICE — NEEDLE SPNL 25GA L2IN BLU SHT NEO LUM PUNC DISP

## (undated) DEVICE — APPLICATOR MEDICATED 10.5 CC SOLUTION HI LT ORNG CHLORAPREP